# Patient Record
Sex: MALE | Race: WHITE | NOT HISPANIC OR LATINO | Employment: UNEMPLOYED | URBAN - NONMETROPOLITAN AREA
[De-identification: names, ages, dates, MRNs, and addresses within clinical notes are randomized per-mention and may not be internally consistent; named-entity substitution may affect disease eponyms.]

---

## 2017-06-22 ENCOUNTER — HOSPITAL ENCOUNTER (EMERGENCY)
Facility: HOSPITAL | Age: 27
Discharge: HOME | End: 2017-06-22
Attending: STUDENT IN AN ORGANIZED HEALTH CARE EDUCATION/TRAINING PROGRAM | Admitting: STUDENT IN AN ORGANIZED HEALTH CARE EDUCATION/TRAINING PROGRAM

## 2017-06-22 VITALS
TEMPERATURE: 98.7 F | HEART RATE: 90 BPM | BODY MASS INDEX: 37.13 KG/M2 | WEIGHT: 245 LBS | OXYGEN SATURATION: 97 % | HEIGHT: 68 IN | DIASTOLIC BLOOD PRESSURE: 87 MMHG | RESPIRATION RATE: 18 BRPM | SYSTOLIC BLOOD PRESSURE: 139 MMHG

## 2017-06-22 DIAGNOSIS — I10 ESSENTIAL HYPERTENSION: Primary | ICD-10-CM

## 2017-06-22 PROCEDURE — 99283 EMERGENCY DEPT VISIT LOW MDM: CPT

## 2017-06-22 RX ORDER — PANTOPRAZOLE SODIUM 40 MG/1
40 TABLET, DELAYED RELEASE ORAL DAILY
COMMUNITY
End: 2019-02-06 | Stop reason: SDUPTHER

## 2017-06-22 NOTE — ED PROVIDER NOTES
Subjective   HPI Comments: Patient is a 26-year-old male who presents with concerns for high blood pressure.  His mother checked his blood pressure today and he says his blood pressure was 160/120.  He smokes, but has no other chronic health issues.  Patient denies headache, vision changes, chest pain, shortness of breath, abdominal pain, nausea, vomiting or diarrhea.      Review of Systems   All other systems reviewed and are negative.      Past Medical History:   Diagnosis Date   • GERD (gastroesophageal reflux disease)        No Known Allergies    History reviewed. No pertinent surgical history.    History reviewed. No pertinent family history.    Social History     Social History   • Marital status:      Spouse name: N/A   • Number of children: N/A   • Years of education: N/A     Social History Main Topics   • Smoking status: Current Every Day Smoker     Packs/day: 1.00     Types: Cigarettes   • Smokeless tobacco: None   • Alcohol use No   • Drug use: No   • Sexual activity: Not Asked     Other Topics Concern   • None     Social History Narrative   • None           Objective   Physical Exam   Nursing note and vitals reviewed.    GEN: No acute distress  Head: Normocephalic, atraumatic  Eyes: Pupils equal round reactive to light  ENT: Posterior pharynx normal in appearance, oral mucosa is moist  Chest: Nontender to palpation  Cardiovascular: Regular rate  Lungs: Clear to auscultation bilaterally  Abdomen: Soft, nontender, nondistended, no peritoneal signs  Extremities: No edema, normal appearance  Neuro: GCS 15  Psych: Mood and affect are appropriate    Procedures         ED Course  ED Course                  MDM  Number of Diagnoses or Management Options  Essential hypertension:   Diagnosis management comments: Patient's blood pressure readings in the emergency department did not warrant immediate medication.  I did explain him that he would need a repeat in one week if it remained elevated that he would  need to be started on medication at that time.  Patient has no secondary symptoms.      Final diagnoses:   Essential hypertension            Jessee Renner MD  06/22/17 [id number]

## 2018-08-19 ENCOUNTER — HOSPITAL ENCOUNTER (EMERGENCY)
Facility: HOSPITAL | Age: 28
Discharge: HOME | End: 2018-08-19
Attending: EMERGENCY MEDICINE | Admitting: EMERGENCY MEDICINE

## 2018-08-19 VITALS
BODY MASS INDEX: 37.31 KG/M2 | SYSTOLIC BLOOD PRESSURE: 165 MMHG | RESPIRATION RATE: 18 BRPM | HEIGHT: 68 IN | OXYGEN SATURATION: 99 % | TEMPERATURE: 98.6 F | HEART RATE: 89 BPM | WEIGHT: 246.2 LBS | DIASTOLIC BLOOD PRESSURE: 89 MMHG

## 2018-08-19 DIAGNOSIS — H04.302 DACRYOCYSTITIS OF LEFT LACRIMAL SAC: Primary | ICD-10-CM

## 2018-08-19 PROCEDURE — 99282 EMERGENCY DEPT VISIT SF MDM: CPT

## 2018-08-19 RX ORDER — CLINDAMYCIN HYDROCHLORIDE 150 MG/1
600 CAPSULE ORAL ONCE
Status: DISCONTINUED | OUTPATIENT
Start: 2018-08-19 | End: 2018-08-19 | Stop reason: HOSPADM

## 2018-08-19 RX ORDER — LISINOPRIL 5 MG/1
5 TABLET ORAL DAILY
COMMUNITY
End: 2019-02-06 | Stop reason: SDUPTHER

## 2018-08-19 RX ORDER — CLINDAMYCIN HYDROCHLORIDE 300 MG/1
300 CAPSULE ORAL 3 TIMES DAILY
Qty: 21 CAPSULE | Refills: 0 | Status: SHIPPED | OUTPATIENT
Start: 2018-08-19 | End: 2019-02-06

## 2018-08-19 NOTE — ED PROVIDER NOTES
Subjective   History of Present Illness  27-year-old male otherwise healthy presenting with 2 days of swelling, redness, and slight pain underneath the left eye.  Denies fevers, chills, nausea, vomiting, pain when he moves his eye, changes in vision, foreign body sensation, drainage from the eye or trauma to the eye.  He has been on amoxicillin for a strep throat for the last 5 days.    Review of Systems   All other systems reviewed and are negative.      Past Medical History:   Diagnosis Date   • GERD (gastroesophageal reflux disease)        No Known Allergies    History reviewed. No pertinent surgical history.    History reviewed. No pertinent family history.    Social History     Social History   • Marital status:      Social History Main Topics   • Smoking status: Current Every Day Smoker     Packs/day: 1.00     Types: Cigarettes   • Alcohol use No   • Drug use: No     Other Topics Concern   • Not on file           Objective   Physical Exam   Constitutional: He is oriented to person, place, and time. He appears well-developed and well-nourished. No distress.   HENT:   Head: Normocephalic.   Mouth/Throat: Oropharynx is clear and moist.   Eyes: EOM are normal. Left eye exhibits no discharge. No scleral icterus.   Swelling just inferior to the left eyelid with mild erythema.    Neck: Normal range of motion. Neck supple. No tracheal deviation present.   Cardiovascular: Normal rate, regular rhythm, normal heart sounds and intact distal pulses.  Exam reveals no gallop and no friction rub.    No murmur heard.  Pulmonary/Chest: Effort normal and breath sounds normal. No stridor. No respiratory distress. He has no wheezes. He has no rales.   Neurological: He is alert and oriented to person, place, and time.   Skin: Skin is warm and dry. No rash noted. He is not diaphoretic. No erythema. No pallor.   Psychiatric: He has a normal mood and affect. His behavior is normal.   Nursing note and vitals  reviewed.      Procedures           ED Course                  MDM  27-year-old male here with what appears to be dacryocystitis.  No evidence on exam of conjunctivitis nor of orbital cellulitis.  Unfortunately, the amoxicillin likely is not covering all of possible agents.  We'll need to start on clindamycin for broader coverage, recommend cool compresses, and give information to call and follow-up with ophthalmology. Discussed return to care precautions.     Final diagnoses:   Dacryocystitis of left lacrimal sac            Cristi Starr MD  08/19/18 [id number]

## 2019-02-06 ENCOUNTER — OFFICE VISIT (OUTPATIENT)
Dept: FAMILY MEDICINE CLINIC | Facility: CLINIC | Age: 29
End: 2019-02-06

## 2019-02-06 VITALS
SYSTOLIC BLOOD PRESSURE: 122 MMHG | WEIGHT: 248.4 LBS | HEART RATE: 82 BPM | OXYGEN SATURATION: 96 % | HEIGHT: 71 IN | TEMPERATURE: 98.8 F | DIASTOLIC BLOOD PRESSURE: 78 MMHG | RESPIRATION RATE: 18 BRPM | BODY MASS INDEX: 34.77 KG/M2

## 2019-02-06 DIAGNOSIS — Z00.00 HEALTHCARE MAINTENANCE: Primary | ICD-10-CM

## 2019-02-06 DIAGNOSIS — I10 ESSENTIAL HYPERTENSION: ICD-10-CM

## 2019-02-06 DIAGNOSIS — K21.9 GASTROESOPHAGEAL REFLUX DISEASE WITHOUT ESOPHAGITIS: ICD-10-CM

## 2019-02-06 LAB
ALBUMIN SERPL-MCNC: 4.7 G/DL (ref 3.2–4.8)
ALBUMIN/GLOB SERPL: 2.2 G/DL (ref 1.5–2.5)
ALP SERPL-CCNC: 67 U/L (ref 25–100)
ALT SERPL W P-5'-P-CCNC: 35 U/L (ref 7–40)
ANION GAP SERPL CALCULATED.3IONS-SCNC: 4 MMOL/L (ref 3–11)
ARTICHOKE IGE QN: 109 MG/DL (ref 0–130)
AST SERPL-CCNC: 27 U/L (ref 0–33)
BASOPHILS # BLD AUTO: 0.04 10*3/MM3 (ref 0–0.2)
BASOPHILS NFR BLD AUTO: 0.4 % (ref 0–1)
BILIRUB BLD-MCNC: NEGATIVE MG/DL
BILIRUB SERPL-MCNC: 0.3 MG/DL (ref 0.3–1.2)
BUN BLD-MCNC: 19 MG/DL (ref 9–23)
BUN/CREAT SERPL: 20.2 (ref 7–25)
CALCIUM SPEC-SCNC: 9.6 MG/DL (ref 8.7–10.4)
CHLORIDE SERPL-SCNC: 106 MMOL/L (ref 99–109)
CHOLEST SERPL-MCNC: 149 MG/DL (ref 0–200)
CLARITY, POC: CLEAR
CO2 SERPL-SCNC: 28 MMOL/L (ref 20–31)
COLOR UR: YELLOW
CREAT BLD-MCNC: 0.94 MG/DL (ref 0.6–1.3)
CRP SERPL-MCNC: 1.16 MG/DL (ref 0–1)
DEPRECATED RDW RBC AUTO: 42.7 FL (ref 37–54)
EOSINOPHIL # BLD AUTO: 0.2 10*3/MM3 (ref 0–0.3)
EOSINOPHIL NFR BLD AUTO: 2.2 % (ref 0–3)
ERYTHROCYTE [DISTWIDTH] IN BLOOD BY AUTOMATED COUNT: 13.1 % (ref 11.3–14.5)
GFR SERPL CREATININE-BSD FRML MDRD: 96 ML/MIN/1.73
GLOBULIN UR ELPH-MCNC: 2.1 GM/DL
GLUCOSE BLD-MCNC: 74 MG/DL (ref 70–100)
GLUCOSE UR STRIP-MCNC: NEGATIVE MG/DL
HBA1C MFR BLD: 5.5 % (ref 4.8–5.6)
HCT VFR BLD AUTO: 47.5 % (ref 38.9–50.9)
HDLC SERPL-MCNC: 25 MG/DL (ref 40–60)
HGB BLD-MCNC: 16.1 G/DL (ref 13.1–17.5)
HIV1+2 AB SER QL: NORMAL
IMM GRANULOCYTES # BLD AUTO: 0.02 10*3/MM3 (ref 0–0.03)
IMM GRANULOCYTES NFR BLD AUTO: 0.2 % (ref 0–0.6)
KETONES UR QL: NEGATIVE
LEUKOCYTE EST, POC: NEGATIVE
LYMPHOCYTES # BLD AUTO: 2.74 10*3/MM3 (ref 0.6–4.8)
LYMPHOCYTES NFR BLD AUTO: 29.6 % (ref 24–44)
MCH RBC QN AUTO: 30.3 PG (ref 27–31)
MCHC RBC AUTO-ENTMCNC: 33.9 G/DL (ref 32–36)
MCV RBC AUTO: 89.5 FL (ref 80–99)
MONOCYTES # BLD AUTO: 0.84 10*3/MM3 (ref 0–1)
MONOCYTES NFR BLD AUTO: 9.1 % (ref 0–12)
NEUTROPHILS # BLD AUTO: 5.44 10*3/MM3 (ref 1.5–8.3)
NEUTROPHILS NFR BLD AUTO: 58.7 % (ref 41–71)
NITRITE UR-MCNC: NEGATIVE MG/ML
PH UR: 6 [PH] (ref 5–8)
PLATELET # BLD AUTO: 220 10*3/MM3 (ref 150–450)
PMV BLD AUTO: 12.5 FL (ref 6–12)
POTASSIUM BLD-SCNC: 4.5 MMOL/L (ref 3.5–5.5)
PROT SERPL-MCNC: 6.8 G/DL (ref 5.7–8.2)
PROT UR STRIP-MCNC: NEGATIVE MG/DL
RBC # BLD AUTO: 5.31 10*6/MM3 (ref 4.2–5.76)
RBC # UR STRIP: NEGATIVE /UL
SODIUM BLD-SCNC: 138 MMOL/L (ref 132–146)
SP GR UR: 1.03 (ref 1–1.03)
TRIGL SERPL-MCNC: 255 MG/DL (ref 0–150)
TSH SERPL DL<=0.05 MIU/L-ACNC: 1.64 MIU/ML (ref 0.35–5.35)
URATE SERPL-MCNC: 7.2 MG/DL (ref 3.7–9.2)
UROBILINOGEN UR QL: NORMAL
WBC NRBC COR # BLD: 9.26 10*3/MM3 (ref 3.5–10.8)

## 2019-02-06 PROCEDURE — 80061 LIPID PANEL: CPT | Performed by: FAMILY MEDICINE

## 2019-02-06 PROCEDURE — 99385 PREV VISIT NEW AGE 18-39: CPT | Performed by: FAMILY MEDICINE

## 2019-02-06 PROCEDURE — 86140 C-REACTIVE PROTEIN: CPT | Performed by: FAMILY MEDICINE

## 2019-02-06 PROCEDURE — 84550 ASSAY OF BLOOD/URIC ACID: CPT | Performed by: FAMILY MEDICINE

## 2019-02-06 PROCEDURE — 80050 GENERAL HEALTH PANEL: CPT | Performed by: FAMILY MEDICINE

## 2019-02-06 PROCEDURE — G0432 EIA HIV-1/HIV-2 SCREEN: HCPCS | Performed by: FAMILY MEDICINE

## 2019-02-06 PROCEDURE — 83036 HEMOGLOBIN GLYCOSYLATED A1C: CPT | Performed by: FAMILY MEDICINE

## 2019-02-06 RX ORDER — PANTOPRAZOLE SODIUM 40 MG/1
40 TABLET, DELAYED RELEASE ORAL DAILY
Qty: 30 TABLET | Refills: 1 | Status: SHIPPED | OUTPATIENT
Start: 2019-02-06 | End: 2019-03-08 | Stop reason: SDUPTHER

## 2019-02-06 RX ORDER — LISINOPRIL 5 MG/1
5 TABLET ORAL DAILY
Qty: 90 TABLET | Refills: 3 | Status: SHIPPED | OUTPATIENT
Start: 2019-02-06 | End: 2020-01-31 | Stop reason: SDUPTHER

## 2019-02-06 NOTE — PROGRESS NOTES
Subjective   Rahul Walters is a 28 y.o. male.     History of Present Illness   New patient to the office.  He reports previous care by Dr. Escoto in [place], [place].  He is here for his annual fasting wellness evaluation.  He declines immunization update.  He is needing a refill of his medications.  He is tolerating them well with no adverse events.  He describes good blood pressure control.  He denies reflux problems on his PPI.  He has never undergone a screening EGD.  He continues to smoke cigarettes and voices plans to discontinue.    Review of Systems   Constitutional: Negative.    HENT: Positive for dental problem (chronic).    Eyes: Negative.    Respiratory: Negative.    Cardiovascular: Negative.    Gastrointestinal: Negative.    Endocrine: Negative.    Genitourinary: Negative.    Musculoskeletal: Negative.    Skin: Negative.    Allergic/Immunologic: Negative.    Neurological: Negative.    Hematological: Negative.    Psychiatric/Behavioral: Negative.        Objective   Physical Exam   Constitutional: He is oriented to person, place, and time. He appears well-developed and well-nourished. He is cooperative.   Poor hygiene   HENT:   Head: Normocephalic and atraumatic.   Right Ear: Hearing and external ear normal.   Left Ear: Hearing and external ear normal.   Nose: Nose normal.   Mouth/Throat: Uvula is midline, oropharynx is clear and moist and mucous membranes are normal. Abnormal dentition. Dental caries present.   Eyes: Conjunctivae and EOM are normal. Pupils are equal, round, and reactive to light. No scleral icterus.   Neck: Trachea normal and normal range of motion. Neck supple. No JVD present. Carotid bruit is not present. No thyromegaly present.   Cardiovascular: Normal rate, regular rhythm, normal heart sounds and intact distal pulses.   Pulmonary/Chest: Effort normal and breath sounds normal.   Abdominal: Soft. Bowel sounds are normal. There is no hepatosplenomegaly. There is no tenderness.    Musculoskeletal: Normal range of motion.   Lymphadenopathy:     He has no cervical adenopathy.   Neurological: He is alert and oriented to person, place, and time. He has normal strength and normal reflexes. No sensory deficit. Gait normal.   Skin: Skin is warm and dry.   Psychiatric: He has a normal mood and affect. His speech is normal and behavior is normal. Judgment and thought content normal. Cognition and memory are normal.   Nursing note and vitals reviewed.      Assessment/Plan   Diagnoses and all orders for this visit:    Healthcare maintenance  -     POC Urinalysis Dipstick, Automated  -     Comprehensive Metabolic Panel  -     CBC & Differential  -     Lipid Panel  -     TSH  -     Uric Acid  -     C-reactive Protein  -     HIV-1 / O / 2 Ag / Antibody 4th Generation  -     Hemoglobin A1c  - Dental hygiene and routine dental evaluations  -     Ambulatory Referral to Nutrition Services  - Tobacco cessation education        -     The patient declined immunization update today        -     CDC.gov immunizations web site for patient vaccine information    Essential hypertension  -     lisinopril (PRINIVIL,ZESTRIL) 5 MG tablet; Take 1 tablet by mouth Daily #90 c 3 RF  -     POC Urinalysis Dipstick, Automated  -     Comprehensive Metabolic Panel  - Healthy heart diet  - Weight loss  - Low dose aspirin once daily  - Daily aerobic exercise  - Routine blood pressure monitoring  - [place] Heart Disease and Stroke Prevention Task Force pamphlet reviewed and administered.    Gastroesophageal reflux disease without esophagitis  -     pantoprazole (PROTONIX) 40 MG EC tablet; Take 1 tablet by mouth Daily #30 c 1RF  -     CBC & Differential  -     Ambulatory referral for Screening EGD  - HOB elevated  - Weight loss  - Avoid late night meals  - Avoid excessive caffeine   - Avoid excessive carbonated beverages  - Report any dysphagia or odynophagia    The patient is here for a health maintenance visit.  Currently, the  patient consumes a calorie enriched diet and has an inadequate exercise regimen. Screening lab work is ordered.  Immunizations are declined today and vaccine information is provided.  Advice and education is given regarding nutrition, aerobic exercise, routine dental evaluations (please see dentist), routine eye exams, reproductive health, cardiovascular risk reduction, sunscreen use, self skin examination (annual dermatology evaluations) and seat belt use (general overall safety).  Further recommendations after lab evaluation.  Annual wellness evaluations recommended.

## 2019-03-08 DIAGNOSIS — K21.9 GASTROESOPHAGEAL REFLUX DISEASE WITHOUT ESOPHAGITIS: ICD-10-CM

## 2019-03-08 RX ORDER — PANTOPRAZOLE SODIUM 40 MG/1
40 TABLET, DELAYED RELEASE ORAL DAILY
Qty: 30 TABLET | Refills: 6 | Status: SHIPPED | OUTPATIENT
Start: 2019-03-08 | End: 2019-03-12 | Stop reason: SDUPTHER

## 2019-03-08 RX ORDER — PANTOPRAZOLE SODIUM 40 MG/1
40 TABLET, DELAYED RELEASE ORAL DAILY
Qty: 30 TABLET | Refills: 1 | Status: CANCELLED | OUTPATIENT
Start: 2019-03-08

## 2019-03-12 ENCOUNTER — TELEPHONE (OUTPATIENT)
Dept: FAMILY MEDICINE CLINIC | Facility: CLINIC | Age: 29
End: 2019-03-12

## 2019-03-12 DIAGNOSIS — K21.9 GASTROESOPHAGEAL REFLUX DISEASE WITHOUT ESOPHAGITIS: ICD-10-CM

## 2019-03-12 RX ORDER — PANTOPRAZOLE SODIUM 40 MG/1
40 TABLET, DELAYED RELEASE ORAL DAILY
Qty: 30 TABLET | Refills: 11 | Status: SHIPPED | OUTPATIENT
Start: 2019-03-12 | End: 2019-05-07

## 2019-03-12 NOTE — TELEPHONE ENCOUNTER
----- Message from Alexi Colindres sent at 3/12/2019  7:56 AM EDT -----  Regarding: PRESCRIPTION  Contact: [phone]  PLEASE CALL PT HE WOULD LIKE TO KNOW WHY HIS MED REFILL REQUEST WAS DENIED

## 2019-04-17 ENCOUNTER — OFFICE VISIT (OUTPATIENT)
Dept: FAMILY MEDICINE CLINIC | Facility: CLINIC | Age: 29
End: 2019-04-17

## 2019-04-17 VITALS
BODY MASS INDEX: 35.22 KG/M2 | SYSTOLIC BLOOD PRESSURE: 124 MMHG | WEIGHT: 251.6 LBS | HEART RATE: 104 BPM | HEIGHT: 71 IN | DIASTOLIC BLOOD PRESSURE: 82 MMHG | RESPIRATION RATE: 18 BRPM | OXYGEN SATURATION: 98 %

## 2019-04-17 DIAGNOSIS — R42 LIGHT HEADEDNESS: Primary | ICD-10-CM

## 2019-04-17 PROCEDURE — 93000 ELECTROCARDIOGRAM COMPLETE: CPT | Performed by: FAMILY MEDICINE

## 2019-04-17 PROCEDURE — 99214 OFFICE O/P EST MOD 30 MIN: CPT | Performed by: FAMILY MEDICINE

## 2019-04-17 NOTE — PROGRESS NOTES
"Subjective   Rahul Walters is a 28 y.o. male.     History of Present Illness   The patient is accompanied by his wife.  He describes several year history of \"light headedness.\"  He describes a previous work up by a cardiologist in [place] in 2017.  He underwent a holter monitor and echo (data deficit today & records requested).  He reports an intermittent sensation of feeling \"wobbly\" and \"light headed\" with postural changes most commonly with standing.  When he sits back down it resolves in a minute.  He denies a spinning room sensation.  He denies falls or syncope.  He reports no associated palpitations, racing heart beats or confusion.  He denies headaches, visual disturbances, hearing loss or tinnitus.  He denies drug use, environmental exposure or recalled history of tick bites.  He is presently unemployed and stays home with the children.    Review of Systems   Constitutional: Negative for diaphoresis.   HENT: Negative for trouble swallowing.    Respiratory: Negative for shortness of breath.    Cardiovascular: Negative for chest pain, palpitations and leg swelling.   Musculoskeletal: Negative for gait problem.   Neurological: Positive for light-headedness. Negative for dizziness, tremors, seizures, syncope and weakness.   Psychiatric/Behavioral: Negative for confusion. The patient is nervous/anxious.        Objective   Physical Exam   Constitutional: He is oriented to person, place, and time. He appears well-developed and well-nourished.   HENT:   Head: Normocephalic and atraumatic.   Right Ear: Hearing and tympanic membrane normal.   Left Ear: Hearing and tympanic membrane normal.   Mouth/Throat: Mucous membranes are normal. Abnormal dentition.   Eyes: Conjunctivae and EOM are normal. Pupils are equal, round, and reactive to light.   Neck: Neck supple. No JVD present. No thyromegaly present.   Cardiovascular: Normal rate, regular rhythm and normal heart sounds.   Pulmonary/Chest: Effort normal and " breath sounds normal.   Abdominal: Soft. Bowel sounds are normal. There is no tenderness.   Musculoskeletal: Normal range of motion.   Neurological: He is alert and oriented to person, place, and time. He has normal strength and normal reflexes. No cranial nerve deficit or sensory deficit. Coordination and gait normal.   Skin: Skin is warm and dry.   Psychiatric: He has a normal mood and affect. His behavior is normal. Judgment and thought content normal. Cognition and memory are normal.   Nursing note and vitals reviewed.      ECG 12 Lead  Date/Time: 4/17/2019 9:21 AM  Performed by: Alfredo Riddle MD  Authorized by: Alfredo Riddle MD   Comparison: not compared with previous ECG   Previous ECG: no previous ECG available  Rhythm: sinus rhythm  Rate: normal  BPM: 80  Conduction: non-specific intraventricular conduction delay  QRS axis: normal    Clinical impression: abnormal EKG          Feb. 6, 2019 labs reviewed with satisfactory results.    Assessment/Plan   Diagnoses and all orders for this visit:    Light headedness  -     ECG 12 Lead  -     Tilt table test ordered  - Acquire records from [place] cardiologist  - Fall precautions  - ASA 81 mg po once daily  - Go to the ED with any sudden changes or concerns  -    Today I have spent a total of 25 minutes face to face with Rahul Walters and his wife.  During this time, a total of 15 minutes was spent counseling on the nature of his diagnosis including risks and benefits of treatment, complications, implications, management, safe and proper use of medications.  We discussed the work up and specialist referral process if needed.  Today I encouraged therapeutic lifestyle changes including tobacco cessation, a low calorie, healthy heart diet, daily aerobic exercise and medication compliance.  Patient education is provided today concerning related diagnosis with educational resources reviewed and discussed.  I encouraged compliance with follow up appointments  and referrals.

## 2019-05-07 ENCOUNTER — OFFICE VISIT (OUTPATIENT)
Dept: FAMILY MEDICINE CLINIC | Facility: CLINIC | Age: 29
End: 2019-05-07

## 2019-05-07 VITALS
OXYGEN SATURATION: 98 % | SYSTOLIC BLOOD PRESSURE: 130 MMHG | HEART RATE: 104 BPM | HEIGHT: 71 IN | BODY MASS INDEX: 35.61 KG/M2 | RESPIRATION RATE: 18 BRPM | DIASTOLIC BLOOD PRESSURE: 82 MMHG | TEMPERATURE: 98.5 F | WEIGHT: 254.4 LBS

## 2019-05-07 DIAGNOSIS — R42 VERTIGO: Primary | ICD-10-CM

## 2019-05-07 PROCEDURE — 99213 OFFICE O/P EST LOW 20 MIN: CPT | Performed by: FAMILY MEDICINE

## 2019-05-07 RX ORDER — PANTOPRAZOLE SODIUM 20 MG/1
TABLET, DELAYED RELEASE ORAL
COMMUNITY
Start: 2019-05-06 | End: 2020-01-09

## 2019-05-07 RX ORDER — MECLIZINE HYDROCHLORIDE 25 MG/1
25 TABLET ORAL 3 TIMES DAILY PRN
Qty: 30 TABLET | Refills: 0 | Status: SHIPPED | OUTPATIENT
Start: 2019-05-07 | End: 2020-01-09

## 2019-05-07 NOTE — PROGRESS NOTES
Subjective   Rahul Walters is a 28 y.o. male.     History of Present Illness   The patient describes recent onset of sinus congestion and post nasal drip he went to a local [facility].  Over the last couple of  days the patient has been experiencing a spinning room sensation that is characterized as dizziness.  It occurs with sudden movements.   It gets worse with head turning and body position change.  The room starts to spin.  There is associated nausea but no emesis.  The patient denies falls, fainting/syncope or hearing loss.  There is no associated tinnitus, visual disturbances, mental confusion or sudden headaches.  The symptoms improve with remaining still.    Review of Systems   Constitutional: Negative for chills and fever.   HENT: Positive for congestion.    Respiratory: Negative for cough and shortness of breath.    Cardiovascular: Negative for chest pain.   Neurological: Negative for syncope.       Objective   Physical Exam   Constitutional: He is oriented to person, place, and time. He appears well-developed and well-nourished.   HENT:   Head: Normocephalic and atraumatic.   Right Ear: Hearing normal.   Left Ear: Hearing normal.   Nose: Mucosal edema and rhinorrhea present.   Mouth/Throat: Mucous membranes are normal.   Eyes: Conjunctivae and EOM are normal. Pupils are equal, round, and reactive to light. Right eye exhibits no nystagmus. Left eye exhibits no nystagmus.   Neck: Neck supple. No JVD present. No thyromegaly present.   Cardiovascular: Normal rate, regular rhythm and normal heart sounds.   Pulmonary/Chest: Effort normal and breath sounds normal.   Musculoskeletal: Normal range of motion.   Neurological: He is alert and oriented to person, place, and time. He has normal strength. No cranial nerve deficit or sensory deficit. Coordination and gait normal.   Skin: Skin is warm and dry.   Psychiatric: He has a normal mood and affect. His behavior is normal. Judgment and thought content normal.  Cognition and memory are normal.   Nursing note and vitals reviewed.      Assessment/Plan   Diagnoses and all orders for this visit:    Vertigo  -     meclizine (ANTIVERT) 25 MG tablet; Take 1 tablet by mouth 3 Times a Day prn dizziness. #30  - Stay well hydrated  - Avoid driving  - Fall precautions  - Proceed with tilt table test as recommended

## 2019-05-21 ENCOUNTER — APPOINTMENT (OUTPATIENT)
Dept: CARDIOLOGY | Facility: HOSPITAL | Age: 29
End: 2019-05-21

## 2019-06-24 ENCOUNTER — APPOINTMENT (OUTPATIENT)
Dept: CARDIOLOGY | Facility: HOSPITAL | Age: 29
End: 2019-06-24

## 2019-10-11 ENCOUNTER — OFFICE VISIT (OUTPATIENT)
Dept: FAMILY MEDICINE CLINIC | Facility: CLINIC | Age: 29
End: 2019-10-11

## 2019-10-11 VITALS
OXYGEN SATURATION: 98 % | DIASTOLIC BLOOD PRESSURE: 80 MMHG | TEMPERATURE: 97.7 F | HEART RATE: 99 BPM | BODY MASS INDEX: 36.65 KG/M2 | WEIGHT: 261.8 LBS | HEIGHT: 71 IN | RESPIRATION RATE: 16 BRPM | SYSTOLIC BLOOD PRESSURE: 124 MMHG

## 2019-10-11 DIAGNOSIS — F17.200 TOBACCO DEPENDENCE: Primary | ICD-10-CM

## 2019-10-11 DIAGNOSIS — Z23 IMMUNIZATION DUE: ICD-10-CM

## 2019-10-11 PROCEDURE — 90471 IMMUNIZATION ADMIN: CPT | Performed by: FAMILY MEDICINE

## 2019-10-11 PROCEDURE — 90732 PPSV23 VACC 2 YRS+ SUBQ/IM: CPT | Performed by: FAMILY MEDICINE

## 2019-10-11 PROCEDURE — 99407 BEHAV CHNG SMOKING > 10 MIN: CPT | Performed by: FAMILY MEDICINE

## 2019-10-11 PROCEDURE — 90674 CCIIV4 VAC NO PRSV 0.5 ML IM: CPT | Performed by: FAMILY MEDICINE

## 2019-10-11 PROCEDURE — 99214 OFFICE O/P EST MOD 30 MIN: CPT | Performed by: FAMILY MEDICINE

## 2019-10-11 PROCEDURE — 90472 IMMUNIZATION ADMIN EACH ADD: CPT | Performed by: FAMILY MEDICINE

## 2019-10-11 PROCEDURE — 90715 TDAP VACCINE 7 YRS/> IM: CPT | Performed by: FAMILY MEDICINE

## 2019-10-11 RX ORDER — VARENICLINE TARTRATE 1 MG/1
1 TABLET, FILM COATED ORAL 2 TIMES DAILY
Qty: 56 TABLET | Refills: 1 | Status: SHIPPED | OUTPATIENT
Start: 2019-10-11 | End: 2020-03-24 | Stop reason: SDUPTHER

## 2019-10-11 RX ORDER — NICOTINE 21 MG/24HR
1 PATCH, TRANSDERMAL 24 HOURS TRANSDERMAL EVERY 24 HOURS
Start: 2019-10-11 | End: 2020-01-09 | Stop reason: SDUPTHER

## 2019-10-11 NOTE — PATIENT INSTRUCTIONS
Preventing Unhealthy Weight Gain, Adult  Staying at a healthy weight is important to your overall health. When fat builds up in your body, you may become overweight or obese. Being overweight or obese increases your risk of developing certain health problems, such as heart disease, diabetes, sleeping problems, joint problems, and some types of cancer.  Unhealthy weight gain is often the result of making unhealthy food choices or not getting enough exercise. You can make changes to your lifestyle to prevent obesity and stay as healthy as possible.  What nutrition changes can be made?    · Eat only as much as your body needs. To do this:  ? Pay attention to signs that you are hungry or full. Stop eating as soon as you feel full.  ? If you feel hungry, try drinking water first before eating. Drink enough water so your urine is clear or pale yellow.  ? Eat smaller portions. Pay attention to portion sizes when eating out.  ? Look at serving sizes on food labels. Most foods contain more than one serving per container.  ? Eat the recommended number of calories for your gender and activity level. For most active people, a daily total of 2,000 calories is appropriate. If you are trying to lose weight or are not very active, you may need to eat fewer calories. Talk with your health care provider or a diet and nutrition specialist (dietitian) about how many calories you need each day.  · Choose healthy foods, such as:  ? Fruits and vegetables. At each meal, try to fill at least half of your plate with fruits and vegetables.  ? Whole grains, such as whole-wheat bread, brown rice, and quinoa.  ? Lean meats, such as chicken or fish.  ? Other healthy proteins, such as beans, eggs, or tofu.  ? Healthy fats, such as nuts, seeds, fatty fish, and olive oil.  ? Low-fat or fat-free dairy products.  · Check food labels, and avoid food and drinks that:  ? Are high in calories.  ? Have added sugar.  ? Are high in sodium.  ? Have saturated  fats or trans fats.  · Cook foods in healthier ways, such as by baking, broiling, or grilling.  · Make a meal plan for the week, and shop with a grocery list to help you stay on track with your purchases. Try to avoid going to the grocery store when you are hungry.  · When grocery shopping, try to shop around the outside of the store first, where the fresh foods are. Doing this helps you to avoid prepackaged foods, which can be high in sugar, salt (sodium), and fat.  What lifestyle changes can be made?    · Exercise for 30 or more minutes on 5 or more days each week. Exercising may include brisk walking, yard work, biking, running, swimming, and team sports like basketball and soccer. Ask your health care provider which exercises are safe for you.  · Do muscle-strengthening activities, such as lifting weights or using resistance bands, on 2 or more days a week.  · Do not use any products that contain nicotine or tobacco, such as cigarettes and e-cigarettes. If you need help quitting, ask your health care provider.  · Limit alcohol intake to no more than 1 drink a day for nonpregnant women and 2 drinks a day for men. One drink equals 12 oz of beer, 5 oz of wine, or 1½ oz of hard liquor.  · Try to get 7-9 hours of sleep each night.  What other changes can be made?  · Keep a food and activity journal to keep track of:  ? What you ate and how many calories you had. Remember to count the calories in sauces, dressings, and side dishes.  ? Whether you were active, and what exercises you did.  ? Your calorie, weight, and activity goals.  · Check your weight regularly. Track any changes. If you notice you have gained weight, make changes to your diet or activity routine.  · Avoid taking weight-loss medicines or supplements. Talk to your health care provider before starting any new medicine or supplement.  · Talk to your health care provider before trying any new diet or exercise plan.  Why are these changes  important?  Eating healthy, staying active, and having healthy habits can help you to prevent obesity. Those changes also:  · Help you manage stress and emotions.  · Help you connect with friends and family.  · Improve your self-esteem.  · Improve your sleep.  · Prevent long-term health problems.  What can happen if changes are not made?  Being obese or overweight can cause you to develop joint or bone problems, which can make it hard for you to stay active or do activities you enjoy. Being obese or overweight also puts stress on your heart and lungs and can lead to health problems like diabetes, heart disease, and some cancers.  Where to find more information  Talk with your health care provider or a dietitian about healthy eating and healthy lifestyle choices. You may also find information from:  · [place] Department of Agriculture, MyPlate: www.IGIGImyplate.gov  · American Heart Association: www.heart.org  · Centers for Disease Control and Prevention: www.cdc.gov  Summary  · Staying at a healthy weight is important to your overall health. It helps you to prevent certain diseases and health problems, such as heart disease, diabetes, joint problems, sleep disorders, and some types of cancer.  · Being obese or overweight can cause you to develop joint or bone problems, which can make it hard for you to stay active or do activities you enjoy.  · You can prevent unhealthy weight gain by eating a healthy diet, exercising regularly, not smoking, limiting alcohol, and getting enough sleep.  · Talk with your health care provider or a dietitian for guidance about healthy eating and healthy lifestyle choices.  This information is not intended to replace advice given to you by your health care provider. Make sure you discuss any questions you have with your health care provider.  Document Released: 12/19/2017 Document Revised: 09/28/2018 Document Reviewed: 01/24/2018  [employer] Interactive Patient Education © 2019 [employer]

## 2019-10-11 NOTE — PROGRESS NOTES
Subjective   Rahul Walters is a 29 y.o. male.     History of Present Illness   He is accompanied by his wife.  He is here to discuss smoking cessation.  He describes about 12 years of smoking 1 pack of cigarettes per day and is interested in quitting.  He is inquiring about medications to help.  He denies acute weight changes, dyspnea at rest, hemoptysis or unusual coughing.  He denies previous mood disorder treatments.  He voices no recent illness, fever or rashes.  He is amendable to updating his immunizations including the annual flu shot.    Review of Systems   Constitutional: Negative for chills and fever.   Eyes: Negative for visual disturbance.   Respiratory: Negative for cough and shortness of breath.    Cardiovascular: Negative for chest pain, palpitations and leg swelling.   Gastrointestinal: Negative for abdominal pain, diarrhea, nausea and vomiting.   Genitourinary: Negative for difficulty urinating.   Musculoskeletal: Negative for arthralgias.   Skin: Negative for rash.       Objective   Physical Exam   Constitutional: He is oriented to person, place, and time. He appears well-developed and well-nourished.   HENT:   Head: Normocephalic and atraumatic.   Right Ear: Hearing normal.   Left Ear: Hearing normal.   Mouth/Throat: Mucous membranes are normal. Abnormal dentition.   Eyes: Conjunctivae and EOM are normal. Pupils are equal, round, and reactive to light.   Neck: Neck supple. No JVD present. No thyromegaly present.   Cardiovascular: Normal rate, regular rhythm and normal heart sounds.   Pulmonary/Chest: Effort normal and breath sounds normal.   Musculoskeletal: Normal range of motion.   Neurological: He is alert and oriented to person, place, and time. He has normal strength. No sensory deficit. Gait normal.   Skin: Skin is warm and dry.   Psychiatric: He has a normal mood and affect. His behavior is normal. Judgment and thought content normal. Cognition and memory are normal.   Nursing  note and vitals reviewed.      Assessment/Plan   Diagnoses and all orders for this visit:    Tobacco dependence  -     Pneumococcal Polysaccharide Vaccine 23-Valent IM  - Vaccine counseling and current VIS is provided for immunizations administered today.  -     CHANTIX STARTING MONTH TONI 0.5 MG X 11 & 1 MG X 42 tablet; Take 0.5 mg one daily on days 1-3 and and 0.5 mg twice daily on days 4-7 then 1 mg po bid.  -     CHANTIX CONTINUING MONTH TONI 1 MG tablet; Take 1 tablet by mouth 2 (Two) Times a Day.  -     Ambulatory Referral to Smoking Cessation Program  -     nicotine (EQ NICOTINE) 21 MG/24HR patch; Place 1 patch on the skin as directed by provider Daily.  - Rahul Walters is a current cigarettes user.  He currently smokes 1 pack of cigarettes per day for a duration of 12 years. I have educated him on the risk of diseases from using tobacco products such as cancer, COPD and heart diease.  I advised him to quit and he is willing to quit. We have discussed the following method/s for tobacco cessation:  Education Material Counseling OTC Cessation Products Prescription [facility].  Together we have set a quit date for 2 weeks from today.  He will follow up with me in 1 month or sooner to check on his progress.   I spent >10 minutes counseling the patient.  We discussed various smoking cessation programs including Norman Albarran, Commit to Quit and Kick It.  We discussed over the counter nicotine replacement products.  We discussed Chantix and associated advertisements.  We discussed the benefits of cessation.  We discussed the risks of ongoing use including but not limited to lung disease, cancer and death.  The patient is encouraged to discontinue smoking and treatment options have been discussed & offered & prescribed.  Today I have spent a total of 25 minutes face to face with Rahul Walters and his significant other.  During this time, a total of 15 minutes was spent counseling on the nature  of his diagnosis including risks and benefits of treatment, complications, implications, management, safe and proper use of medications.  We discussed his Feb 2019 labs and smoking cessation program referral process.  Today I encouraged therapeutic lifestyle changes including a low calorie, healthy heart diet, daily aerobic exercise, routine dental evaluations and medication compliance.  Patient education is provided today concerning related diagnosis with educational resources reviewed and discussed.  I encouraged compliance with follow up appointments and referrals.      Immunization due  -     Tdap Vaccine IM  -     Fluarix/Fluzone/Afluria Quad>6 Months  -     Pneumococcal Polysaccharide Vaccine 23-Valent IM  - Vaccine counseling and current VIS is provided for immunizations administered today.    RTC in 3-4 weeks

## 2020-01-09 ENCOUNTER — TELEPHONE (OUTPATIENT)
Dept: FAMILY MEDICINE CLINIC | Facility: CLINIC | Age: 30
End: 2020-01-09

## 2020-01-09 ENCOUNTER — OFFICE VISIT (OUTPATIENT)
Dept: FAMILY MEDICINE CLINIC | Facility: CLINIC | Age: 30
End: 2020-01-09

## 2020-01-09 VITALS
TEMPERATURE: 97.9 F | RESPIRATION RATE: 18 BRPM | BODY MASS INDEX: 35.56 KG/M2 | WEIGHT: 254 LBS | DIASTOLIC BLOOD PRESSURE: 98 MMHG | SYSTOLIC BLOOD PRESSURE: 126 MMHG | HEART RATE: 77 BPM | HEIGHT: 71 IN | OXYGEN SATURATION: 98 %

## 2020-01-09 DIAGNOSIS — I10 ESSENTIAL HYPERTENSION: ICD-10-CM

## 2020-01-09 DIAGNOSIS — F17.200 TOBACCO DEPENDENCE: Primary | ICD-10-CM

## 2020-01-09 PROBLEM — K21.9 GERD (GASTROESOPHAGEAL REFLUX DISEASE): Status: ACTIVE | Noted: 2020-01-09

## 2020-01-09 PROCEDURE — 99406 BEHAV CHNG SMOKING 3-10 MIN: CPT | Performed by: FAMILY MEDICINE

## 2020-01-09 PROCEDURE — 99213 OFFICE O/P EST LOW 20 MIN: CPT | Performed by: FAMILY MEDICINE

## 2020-01-09 RX ORDER — POLYETHYLENE GLYCOL 3350 17 G
2 POWDER IN PACKET (EA) ORAL AS NEEDED
Qty: 27 EACH | Refills: 3 | Status: SHIPPED | OUTPATIENT
Start: 2020-01-09 | End: 2021-11-17

## 2020-01-09 RX ORDER — PANTOPRAZOLE SODIUM 40 MG/1
TABLET, DELAYED RELEASE ORAL
COMMUNITY
Start: 2019-12-26 | End: 2020-03-27 | Stop reason: SDUPTHER

## 2020-01-09 RX ORDER — NICOTINE 21 MG/24HR
1 PATCH, TRANSDERMAL 24 HOURS TRANSDERMAL EVERY 24 HOURS
Qty: 28 EACH | Refills: 0 | Status: SHIPPED | OUTPATIENT
Start: 2020-01-09 | End: 2020-06-04

## 2020-01-09 NOTE — TELEPHONE ENCOUNTER
Please tell pharmacy can take lozenges every 1-2 hours as needed with a maximum of 12 lozenges/day.

## 2020-01-09 NOTE — TELEPHONE ENCOUNTER
LVM at pharm with clarification per mendez pt is to use lozenges every 1-2 hours as needed with a maximum of 12 a day.

## 2020-01-09 NOTE — PROGRESS NOTES
Rahul Walters is a 29 y.o. male who presents today to establish care.    Chief Complaint   Patient presents with   • Establish Care        Patient is here today to follow-up on hypertension and smoking cessation.  He also like to establish care as he is a former Dr. Mayberry patient.  Patient states he smokes about a pack and half a day.  He has not tried anything to quit smoking in the past.  Patient was ordered a prescription for Chantix and nicotine replacement at previous visit but had not picked it up as he wanted to talk about the side effects of medications before starting.  Patient does not check his blood pressure at home and has been stable during his office visits.  Encouraged him to quit smoking as this will likely help decrease his blood pressure as well.  Patient denies chest pain, shortness of breath, nausea, vomiting, diarrhea, constipation, urinary symptoms, depression, anxiety, suicidal ideation, and edema.       Review of Systems   Constitutional: Negative for fever and unexpected weight loss.   HENT: Negative for congestion, ear pain and sore throat.    Eyes: Negative for visual disturbance.   Respiratory: Negative for cough, shortness of breath and wheezing.    Cardiovascular: Negative for chest pain and palpitations.   Gastrointestinal: Negative for abdominal pain, blood in stool, constipation, diarrhea, nausea, vomiting and GERD.   Endocrine: Negative for polydipsia and polyuria.   Genitourinary: Negative for difficulty urinating.   Musculoskeletal: Negative for joint swelling.   Skin: Negative for rash and skin lesions.   Allergic/Immunologic: Negative for environmental allergies.   Neurological: Negative for seizures and syncope.   Hematological: Does not bruise/bleed easily.   Psychiatric/Behavioral: Negative for suicidal ideas.        Past Medical History:   Diagnosis Date   • Dental caries    • GERD (gastroesophageal reflux disease)    • Gingivitis    • HTN (hypertension)    •  Obesity    • Periodontal disease    • Tobacco dependence         Past Surgical History:   Procedure Laterality Date   • WISDOM TOOTH EXTRACTION  2017        Family History   Problem Relation Age of Onset   • Heart attack Mother    • Coronary artery disease Mother    • Stroke Father    • Hypertension Father         Social History     Socioeconomic History   • Marital status:      Spouse name: Suni   • Number of children: 2   • Years of education: H.S.   • Highest education level: High school graduate   Occupational History   • Occupation:      Employer: UNEMPLOYED   Tobacco Use   • Smoking status: Current Every Day Smoker     Packs/day: 1.50     Years: 12.00     Pack years: 18.00     Types: Cigarettes   • Smokeless tobacco: Never Used   Substance and Sexual Activity   • Alcohol use: No   • Drug use: No   • Sexual activity: Yes     Partners: Female     Birth control/protection: Tubal ligation        Current Outpatient Medications on File Prior to Visit   Medication Sig Dispense Refill   • lisinopril (PRINIVIL,ZESTRIL) 5 MG tablet Take 1 tablet by mouth Daily. 90 tablet 3   • pantoprazole (PROTONIX) 40 MG EC tablet      • varenicline (CHANTIX CONTINUING MONTH TONI) 1 MG tablet Take 1 tablet by mouth 2 (Two) Times a Day. 56 tablet 1   • varenicline (CHANTIX STARTING MONTH TONI) 0.5 MG X 11 & 1 MG X 42 tablet Take 0.5 mg one daily on days 1-3 and and 0.5 mg twice daily on days 4-7.Then 1 mg twice daily for a total of 12 weeks. 53 tablet 0   • [DISCONTINUED] meclizine (ANTIVERT) 25 MG tablet Take 1 tablet by mouth 3 (Three) Times a Day As Needed for dizziness. 30 tablet 0   • [DISCONTINUED] nicotine (EQ NICOTINE) 21 MG/24HR patch Place 1 patch on the skin as directed by provider Daily.     • [DISCONTINUED] pantoprazole (PROTONIX) 20 MG EC tablet        No current facility-administered medications on file prior to visit.        No Known Allergies     Visit Vitals  /98 (BP Location: Right arm,  "Patient Position: Sitting, Cuff Size: Adult)   Pulse 77   Temp 97.9 °F (36.6 °C) (Temporal)   Resp 18   Ht 180.3 cm (71\")   Wt 115 kg (254 lb)   SpO2 98%   BMI 35.43 kg/m²      Body mass index is 35.43 kg/m².    Physical Exam   Constitutional: He is oriented to person, place, and time. He appears well-developed and well-nourished. No distress.   HENT:   Head: Atraumatic.   Eyes: EOM are normal.   Neck: Normal range of motion. Neck supple.   Cardiovascular: Normal rate, regular rhythm, normal heart sounds and intact distal pulses. Exam reveals no gallop and no friction rub.   No murmur heard.  Pulmonary/Chest: Effort normal and breath sounds normal. No stridor. No respiratory distress. He has no wheezes. He has no rales.   Musculoskeletal: He exhibits no edema.   Neurological: He is alert and oriented to person, place, and time.   Skin: Skin is warm and dry. He is not diaphoretic.   Psychiatric: He has a normal mood and affect. His behavior is normal.        Results for orders placed or performed in visit on 02/06/19   Comprehensive Metabolic Panel   Result Value Ref Range    Glucose 74 70 - 100 mg/dL    BUN 19 9 - 23 mg/dL    Creatinine 0.94 0.60 - 1.30 mg/dL    Sodium 138 132 - 146 mmol/L    Potassium 4.5 3.5 - 5.5 mmol/L    Chloride 106 99 - 109 mmol/L    CO2 28.0 20.0 - 31.0 mmol/L    Calcium 9.6 8.7 - 10.4 mg/dL    Total Protein 6.8 5.7 - 8.2 g/dL    Albumin 4.70 3.20 - 4.80 g/dL    ALT (SGPT) 35 7 - 40 U/L    AST (SGOT) 27 0 - 33 U/L    Alkaline Phosphatase 67 25 - 100 U/L    Total Bilirubin 0.3 0.3 - 1.2 mg/dL    eGFR Non African Amer 96 >60 mL/min/1.73    Globulin 2.1 gm/dL    A/G Ratio 2.2 1.5 - 2.5 g/dL    BUN/Creatinine Ratio 20.2 7.0 - 25.0    Anion Gap 4.0 3.0 - 11.0 mmol/L   Lipid Panel   Result Value Ref Range    Total Cholesterol 149 0 - 200 mg/dL    Triglycerides 255 (H) 0 - 150 mg/dL    HDL Cholesterol 25 (L) 40 - 60 mg/dL    LDL Cholesterol  109 0 - 130 mg/dL   TSH   Result Value Ref Range    TSH " 1.635 0.350 - 5.350 mIU/mL   Uric Acid   Result Value Ref Range    Uric Acid 7.2 3.7 - 9.2 mg/dL   C-reactive Protein   Result Value Ref Range    C-Reactive Protein 1.16 (H) 0.00 - 1.00 mg/dL   HIV-1 / O / 2 Ag / Antibody 4th Generation   Result Value Ref Range    HIV-1/ HIV-2 Non-Reactive Non-Reactive   Hemoglobin A1c   Result Value Ref Range    Hemoglobin A1C 5.50 4.80 - 5.60 %   CBC Auto Differential   Result Value Ref Range    WBC 9.26 3.50 - 10.80 10*3/mm3    RBC 5.31 4.20 - 5.76 10*6/mm3    Hemoglobin 16.1 13.1 - 17.5 g/dL    Hematocrit 47.5 38.9 - 50.9 %    MCV 89.5 80.0 - 99.0 fL    MCH 30.3 27.0 - 31.0 pg    MCHC 33.9 32.0 - 36.0 g/dL    RDW 13.1 11.3 - 14.5 %    RDW-SD 42.7 37.0 - 54.0 fl    MPV 12.5 (H) 6.0 - 12.0 fL    Platelets 220 150 - 450 10*3/mm3    Neutrophil % 58.7 41.0 - 71.0 %    Lymphocyte % 29.6 24.0 - 44.0 %    Monocyte % 9.1 0.0 - 12.0 %    Eosinophil % 2.2 0.0 - 3.0 %    Basophil % 0.4 0.0 - 1.0 %    Immature Grans % 0.2 0.0 - 0.6 %    Neutrophils, Absolute 5.44 1.50 - 8.30 10*3/mm3    Lymphocytes, Absolute 2.74 0.60 - 4.80 10*3/mm3    Monocytes, Absolute 0.84 0.00 - 1.00 10*3/mm3    Eosinophils, Absolute 0.20 0.00 - 0.30 10*3/mm3    Basophils, Absolute 0.04 0.00 - 0.20 10*3/mm3    Immature Grans, Absolute 0.02 0.00 - 0.03 10*3/mm3   POC Urinalysis Dipstick, Automated   Result Value Ref Range    Color Yellow Yellow, Straw, Dark Yellow, Sarah    Clarity, UA Clear Clear    Specific Gravity  1.030 1.005 - 1.030    pH, Urine 6.0 5.0 - 8.0    Leukocytes Negative Negative    Nitrite, UA Negative Negative    Protein, POC Negative Negative mg/dL    Glucose, UA Negative Negative, 1000 mg/dL (3+) mg/dL    Ketones, UA Negative Negative    Urobilinogen, UA Normal Normal    Bilirubin Negative Negative    Blood, UA Negative Negative        Problems Addressed this Visit        Other    Tobacco dependence    Relevant Medications    nicotine polacrilex (NICORETTE) 2 MG lozenge    nicotine (EQ NICOTINE) 21  MG/24HR patch      Other Visit Diagnoses     Well adult exam    -  Primary          Return in about 3 months (around 4/9/2020) for Annual.    Parts of this office note have been dictated by voice recognition software. Grammatical and/or spelling errors may be present.     Boom Benoit MD  1/9/2020

## 2020-01-09 NOTE — TELEPHONE ENCOUNTER
Pharmacy was calling to request clarification on how many lozenges per day the patient is allowed (max amount)

## 2020-01-09 NOTE — ASSESSMENT & PLAN NOTE
Tobacco use is unchanged.  Patient was counseled on smoking cessation techniques for approximately 5 minutes.  Smoking cessation counseling was provided.  Pharmacotherapy was prescribed as ordered.  Tobacco use will be reassessed in 3 months.

## 2020-01-14 ENCOUNTER — TELEPHONE (OUTPATIENT)
Dept: FAMILY MEDICINE CLINIC | Facility: CLINIC | Age: 30
End: 2020-01-14

## 2020-01-14 ENCOUNTER — OFFICE VISIT (OUTPATIENT)
Dept: FAMILY MEDICINE CLINIC | Facility: CLINIC | Age: 30
End: 2020-01-14

## 2020-01-14 VITALS
OXYGEN SATURATION: 97 % | HEART RATE: 90 BPM | RESPIRATION RATE: 24 BRPM | TEMPERATURE: 98.4 F | HEIGHT: 71 IN | WEIGHT: 254 LBS | SYSTOLIC BLOOD PRESSURE: 128 MMHG | BODY MASS INDEX: 35.56 KG/M2 | DIASTOLIC BLOOD PRESSURE: 84 MMHG

## 2020-01-14 DIAGNOSIS — J11.1 BRONCHITIS WITH INFLUENZA: ICD-10-CM

## 2020-01-14 DIAGNOSIS — R06.2 WHEEZING: ICD-10-CM

## 2020-01-14 DIAGNOSIS — J11.1 INFLUENZA: ICD-10-CM

## 2020-01-14 DIAGNOSIS — F17.200 TOBACCO DEPENDENCE: ICD-10-CM

## 2020-01-14 PROCEDURE — 99213 OFFICE O/P EST LOW 20 MIN: CPT | Performed by: NURSE PRACTITIONER

## 2020-01-14 RX ORDER — BENZONATATE 100 MG/1
100 CAPSULE ORAL 3 TIMES DAILY PRN
Qty: 30 CAPSULE | Refills: 0 | Status: SHIPPED | OUTPATIENT
Start: 2020-01-14 | End: 2020-01-24

## 2020-01-14 RX ORDER — OSELTAMIVIR PHOSPHATE 75 MG/1
CAPSULE ORAL
COMMUNITY
Start: 2020-01-13 | End: 2020-05-12

## 2020-01-14 RX ORDER — IPRATROPIUM BROMIDE AND ALBUTEROL SULFATE 2.5; .5 MG/3ML; MG/3ML
3 SOLUTION RESPIRATORY (INHALATION) ONCE
Status: DISCONTINUED | OUTPATIENT
Start: 2020-01-14 | End: 2022-10-20

## 2020-01-14 RX ORDER — ALBUTEROL SULFATE 90 UG/1
2 AEROSOL, METERED RESPIRATORY (INHALATION) EVERY 6 HOURS PRN
Qty: 1 INHALER | Refills: 1 | Status: SHIPPED | OUTPATIENT
Start: 2020-01-14 | End: 2021-11-17

## 2020-01-14 RX ORDER — PREDNISONE 10 MG/1
TABLET ORAL
Qty: 21 TABLET | Refills: 0 | Status: SHIPPED | OUTPATIENT
Start: 2020-01-14 | End: 2020-05-12

## 2020-01-14 NOTE — TELEPHONE ENCOUNTER
Patient states that he got prescribed Prednisone and the script says to take 6 pills today and follow directions on the package for other days, but he has only taken 2 pills and it has made his heart race and was wondering if he should keep taking it.

## 2020-01-14 NOTE — PROGRESS NOTES
Subjective   Rahul Walters is a 29 y.o. male.   Chief Complaint   Patient presents with   • Influenza      Wheezing    The current episode started yesterday. The problem occurs intermittently. The problem has been gradually worsening. Associated symptoms include chills, coughing, a fever, headaches and shortness of breath. The symptoms are aggravated by any activity. He has tried nothing (had a steroid shot yesterday ) for the symptoms. The treatment provided no relief. There is no history of asthma, bronchiolitis, CAD, chronic lung disease, COPD, heart failure, PE or pneumonia.      Patient reports he was diagnosed with influenza yesterday at [facility]. He states he was given IV fluids, IV steroids. Started on tamiflu.   He is here today for feeling of shortness of breath. Wheezing.   He is a current smoker.   He is here with his spouse and young son.       The following portions of the patient's history were reviewed and updated as appropriate: allergies, current medications, past family history, past medical history, past social history, past surgical history and problem list.    Review of Systems   Constitutional: Positive for activity change, appetite change, chills and fever.   HENT: Positive for congestion and dental problem.    Respiratory: Positive for cough, shortness of breath and wheezing.         Clear mucous    Cardiovascular: Negative.    Gastrointestinal: Negative.    Musculoskeletal: Positive for myalgias.   Skin: Negative.    Allergic/Immunologic: Negative.    Neurological: Positive for headaches.   Psychiatric/Behavioral: Negative.      Past Surgical History:   Procedure Laterality Date   • WISDOM TOOTH EXTRACTION  2017     Past Medical History:   Diagnosis Date   • Dental caries    • GERD (gastroesophageal reflux disease)    • Gingivitis    • HTN (hypertension)    • Obesity    • Periodontal disease    • Tobacco dependence        Objective   No Known Allergies  Visit Vitals  /84  "  Pulse 90   Temp 98.4 °F (36.9 °C) (Temporal)   Resp 24   Ht 180.3 cm (71\")   Wt 115 kg (254 lb)   SpO2 97%   BMI 35.43 kg/m²       Physical Exam   Constitutional: He is oriented to person, place, and time. Vital signs are normal. He appears well-developed. He is cooperative. He appears ill.   HENT:   Head: Normocephalic.   Right Ear: Hearing, tympanic membrane, external ear and ear canal normal.   Left Ear: Hearing, tympanic membrane, external ear and ear canal normal.   Mouth/Throat: Uvula is midline. Posterior oropharyngeal erythema present. Tonsils are 2+ on the right. Tonsils are 2+ on the left. No tonsillar exudate.   Eyes: Pupils are equal, round, and reactive to light.   Neck: Normal range of motion.   Cardiovascular: Normal rate and regular rhythm.   Pulmonary/Chest: Effort normal. He has decreased breath sounds. He has wheezes in the right upper field, the right middle field, the left upper field and the left middle field.   Lymphadenopathy:     He has cervical adenopathy.   Neurological: He is alert and oriented to person, place, and time.   Skin: Skin is warm, dry and intact. Capillary refill takes less than 2 seconds.   Psychiatric: He has a normal mood and affect. His behavior is normal.   Vitals reviewed.      Assessment/Plan   Rahul was seen today for influenza.    Diagnoses and all orders for this visit:    Bronchitis with influenza  -     ipratropium-albuterol (DUO-NEB) nebulizer solution 3 mL  -     albuterol sulfate  (90 Base) MCG/ACT inhaler; Inhale 2 puffs Every 6 (Six) Hours As Needed for Wheezing or Shortness of Air (or cough you cannot get under control).  -     predniSONE (DELTASONE) 10 MG (21) tablet pack; Use as directed on package  -     benzonatate (TESSALON PERLES) 100 MG capsule; Take 1 capsule by mouth 3 (Three) Times a Day As Needed for Cough for up to 10 days.    Wheezing  -     ipratropium-albuterol (DUO-NEB) nebulizer solution 3 mL  -     predniSONE (DELTASONE) 10 MG " (21) tablet pack; Use as directed on package    Influenza    Tobacco dependence    continue the tamiflu as prescribed.   Will treat for bronchitis.   Steroids, inhaler and tessalon Perles.   Go to ER for any Severe shortness of breath.   Patient instructed to increase water intake.   Patient reported breathing better after the nebulizer treatment.   Discussed stopping smoking.              Patient Instructions   Acute Bronchitis, Adult    Acute bronchitis is sudden (acute) swelling of the air tubes (bronchi) in the lungs. Acute bronchitis causes these tubes to fill with mucus, which can make it hard to breathe. It can also cause coughing or wheezing.  In adults, acute bronchitis usually goes away within 2 weeks. A cough caused by bronchitis may last up to 3 weeks. Smoking, allergies, and asthma can make the condition worse. Repeated episodes of bronchitis may cause further lung problems, such as chronic obstructive pulmonary disease (COPD).  What are the causes?  This condition can be caused by germs and by substances that irritate the lungs, including:  · Cold and flu viruses. This condition is most often caused by the same virus that causes a cold.  · Bacteria.  · Exposure to tobacco smoke, dust, fumes, and air pollution.  What increases the risk?  This condition is more likely to develop in people who:  · Have close contact with someone with acute bronchitis.  · Are exposed to lung irritants, such as tobacco smoke, dust, fumes, and vapors.  · Have a weak immune system.  · Have a respiratory condition such as asthma.  What are the signs or symptoms?  Symptoms of this condition include:  · A cough.  · Coughing up clear, yellow, or green mucus.  · Wheezing.  · Chest congestion.  · Shortness of breath.  · A fever.  · Body aches.  · Chills.  · A sore throat.  How is this diagnosed?  This condition is usually diagnosed with a physical exam. During the exam, your health care provider may order tests, such as chest  X-rays, to rule out other conditions. He or she may also:  · Test a sample of your mucus for bacterial infection.  · Check the level of oxygen in your blood. This is done to check for pneumonia.  · Do a chest X-ray or lung function testing to rule out pneumonia and other conditions.  · Perform blood tests.  Your health care provider will also ask about your symptoms and medical history.  How is this treated?  Most cases of acute bronchitis clear up over time without treatment. Your health care provider may recommend:  · Drinking more fluids. Drinking more makes your mucus thinner, which may make it easier to breathe.  · Taking a medicine for a fever or cough.  · Taking an antibiotic medicine.  · Using an inhaler to help improve shortness of breath and to control a cough.  · Using a cool mist vaporizer or humidifier to make it easier to breathe.  Follow these instructions at home:  Medicines  · Take over-the-counter and prescription medicines only as told by your health care provider.  · If you were prescribed an antibiotic, take it as told by your health care provider. Do not stop taking the antibiotic even if you start to feel better.  General instructions    · Get plenty of rest.  · Drink enough fluids to keep your urine pale yellow.  · Avoid smoking and secondhand smoke. Exposure to cigarette smoke or irritating chemicals will make bronchitis worse. If you smoke and you need help quitting, ask your health care provider. Quitting smoking will help your lungs heal faster.  · Use an inhaler, cool mist vaporizer, or humidifier as told by your health care provider.  · Keep all follow-up visits as told by your health care provider. This is important.  How is this prevented?  To lower your risk of getting this condition again:  · Wash your hands often with soap and water. If soap and water are not available, use hand .  · Avoid contact with people who have cold symptoms.  · Try not to touch your hands to your  mouth, nose, or eyes.  · Make sure to get the flu shot every year.  Contact a health care provider if:  · Your symptoms do not improve in 2 weeks of treatment.  Get help right away if:  · You cough up blood.  · You have chest pain.  · You have severe shortness of breath.  · You become dehydrated.  · You faint or keep feeling like you are going to faint.  · You keep vomiting.  · You have a severe headache.  · Your fever or chills gets worse.  This information is not intended to replace advice given to you by your health care provider. Make sure you discuss any questions you have with your health care provider.  Document Released: 01/25/2006 Document Revised: 08/01/2018 Document Reviewed: 06/07/2017  [employer] Patient Education © 2019 [employer]        Huong Henry, APRN

## 2020-01-15 NOTE — TELEPHONE ENCOUNTER
Called patient - he took 2 of the prednisone tablets and his heart started racing.     It is now back to his normal.   He has been instructed to take only one tablet at a time.   1 tablet every 8 hours x 3 days and then   1 tablet twice a day x 2 days    Follow up as needed.   Huong Henry, APRN

## 2020-01-31 DIAGNOSIS — I10 ESSENTIAL HYPERTENSION: ICD-10-CM

## 2020-01-31 RX ORDER — LISINOPRIL 5 MG/1
5 TABLET ORAL DAILY
Qty: 30 TABLET | Refills: 0 | Status: SHIPPED | OUTPATIENT
Start: 2020-01-31 | End: 2020-03-13 | Stop reason: SDUPTHER

## 2020-03-13 DIAGNOSIS — F17.200 TOBACCO DEPENDENCE: Primary | ICD-10-CM

## 2020-03-13 DIAGNOSIS — I10 ESSENTIAL HYPERTENSION: ICD-10-CM

## 2020-03-13 DIAGNOSIS — F17.200 TOBACCO DEPENDENCE: ICD-10-CM

## 2020-03-13 RX ORDER — LISINOPRIL 5 MG/1
5 TABLET ORAL DAILY
Qty: 30 TABLET | Refills: 0 | Status: SHIPPED | OUTPATIENT
Start: 2020-03-13 | End: 2020-03-30

## 2020-03-23 ENCOUNTER — TELEPHONE (OUTPATIENT)
Dept: FAMILY MEDICINE CLINIC | Facility: CLINIC | Age: 30
End: 2020-03-23

## 2020-03-23 NOTE — TELEPHONE ENCOUNTER
It was sent as a starting dose pack as patient had requested but someone may have already had symptoms while I was on vacation.

## 2020-03-23 NOTE — TELEPHONE ENCOUNTER
Samantha says they received a refill for Chantix strarting month maxi today.  They say they filled the starting month maxi 10 days ago and didn't know if this should have been for Chantix continuing month maxi.  Samantha is requesting call back for clarification.

## 2020-03-24 DIAGNOSIS — F17.200 TOBACCO DEPENDENCE: ICD-10-CM

## 2020-03-24 RX ORDER — VARENICLINE TARTRATE 1 MG/1
1 TABLET, FILM COATED ORAL 2 TIMES DAILY
Qty: 56 TABLET | Refills: 1 | Status: SHIPPED | OUTPATIENT
Start: 2020-03-24 | End: 2020-06-08 | Stop reason: SDUPTHER

## 2020-03-24 NOTE — TELEPHONE ENCOUNTER
[facility] CALLING THEY NEED A PRESCRIPTION FOR THE CONTINUING PACK  FOR    CHANTIX     PLEASE ADVISE AND GIVE CALL [phone] PH

## 2020-03-27 RX ORDER — PANTOPRAZOLE SODIUM 40 MG/1
40 TABLET, DELAYED RELEASE ORAL DAILY
Qty: 90 TABLET | Refills: 0 | Status: SHIPPED | OUTPATIENT
Start: 2020-03-27 | End: 2020-06-08

## 2020-03-29 DIAGNOSIS — I10 ESSENTIAL HYPERTENSION: ICD-10-CM

## 2020-03-30 RX ORDER — LISINOPRIL 5 MG/1
TABLET ORAL
Qty: 30 TABLET | Refills: 2 | Status: SHIPPED | OUTPATIENT
Start: 2020-03-30 | End: 2020-07-22 | Stop reason: SDUPTHER

## 2020-04-17 ENCOUNTER — E-VISIT (OUTPATIENT)
Dept: FAMILY MEDICINE CLINIC | Facility: CLINIC | Age: 30
End: 2020-04-17

## 2020-04-17 DIAGNOSIS — J02.9 PHARYNGITIS, UNSPECIFIED ETIOLOGY: Primary | ICD-10-CM

## 2020-04-17 PROCEDURE — 99421 OL DIG E/M SVC 5-10 MIN: CPT | Performed by: FAMILY MEDICINE

## 2020-04-17 RX ORDER — AMOXICILLIN 500 MG/1
500 CAPSULE ORAL 2 TIMES DAILY
Qty: 20 CAPSULE | Refills: 0 | Status: SHIPPED | OUTPATIENT
Start: 2020-04-17 | End: 2020-05-12

## 2020-04-17 NOTE — ASSESSMENT & PLAN NOTE
Patient has what sounds like pharyngitis.  He will be given amoxicillin for treatment.  RTC precautions given.

## 2020-04-17 NOTE — PROGRESS NOTES
Rahul Vasquez Romeo    1990  [id number]    I have reviewed the e-Visit questionnaire and patient's answers, my assessment and plan are as follows:    HPI  Sinus Problems E-Visit     Question 4/17/2020  1:21 PM EDT - Filed by Patient on 4/17/2020   What state are you submitting this questionnaire from? [place]   Do you currently have the following symptoms? No   In the last 14 days, have you had contact with anyone known to have the 2019 Novel Coronavirus or anyone being tested for the 2019 Novel Coronavirus? No   Have you experienced a sudden onset of loss of taste or smell? No   Which of the following have you been experiencing? Tenderness in the front/back of the neck    Sore or scratchy throat   Have you had any of the following? Difficulty swallowing   Please enter a few details about your swallowing, breathing, or visual problems. On my left side of my neck there's a little bit of discomfort and when I'm swallowing I can feel it around my neck   How long have you been having these symptoms? For a few days   Do you have a fever? No, I do not have a fever   Do you smoke? Yes   Do you have any chronic illnesses, such as diabetes, heart disease, lung disease, autoimmune disease, or any illness that would weaken your body's ability to fight infection? No   Have you experienced similar problems in the past? Yes   What treatments have worked in the past? Antibiotics   What treatments haven't worked?    Have you recently been hospitalized? No   What medications or other treamtents are you currently using for these symptoms? Pain medicine   Please enter the names of any medications you are taking, or any other treatments you are trying. Tylenol and ibuprofen   Have you had sinus surgery in the past? No   Anything else you would like to add?      Tightness and pian with swallowing. Same with solids and liquids. No change in symptoms. No palpable swelling. No SOA or difficulty swallowing. Has had similar  problem in the past that resolved with antibiotics. He believes it was antibiotics. No nasal congestion, sinus pressure. He has mild productive cough he does not know color of mucus because he swallows it without looking.     Approximately 10 minutes was spent discussing patient's symptoms with him over the phone as well as reviewing his answers to the questionnaire and his chart.    Review of Systems - General ROS: negative for - chills, fatigue or fever  HEENT: Positive for- tightness with swallowing, sore throat  Lungs: Positive for-productive cough, negative for-shortness of air.  Cardio: Negative for-chest pain, palpitations, edema  GI: Negative for-nausea, vomiting, diarrhea, constipation    Diagnoses and all orders for this visit:    Pharyngitis, unspecified etiology    Patient has symptoms suggestive of pharyngitis.  He was given amoxicillin for treatment.  RTC precautions given.    Any medications prescribed have been sent electronically to   [facility] [address] [place] [address] - [address] [place] [address] AT [facility] [address] - [phone]  - [phone] [place] [address]  Phone: [phone] Fax: [phone]        Boom Benoit MD  04/17/20  4:20 PM

## 2020-05-11 ENCOUNTER — E-VISIT (OUTPATIENT)
Dept: FAMILY MEDICINE CLINIC | Facility: CLINIC | Age: 30
End: 2020-05-11

## 2020-05-11 DIAGNOSIS — J02.9 PHARYNGITIS, UNSPECIFIED ETIOLOGY: Primary | ICD-10-CM

## 2020-05-11 PROCEDURE — 99422 OL DIG E/M SVC 11-20 MIN: CPT | Performed by: FAMILY MEDICINE

## 2020-05-12 RX ORDER — AMOXICILLIN AND CLAVULANATE POTASSIUM 875; 125 MG/1; MG/1
1 TABLET, FILM COATED ORAL 2 TIMES DAILY
Qty: 20 TABLET | Refills: 0 | Status: SHIPPED | OUTPATIENT
Start: 2020-05-12 | End: 2020-05-22

## 2020-05-12 RX ORDER — AMOXICILLIN AND CLAVULANATE POTASSIUM 875; 125 MG/1; MG/1
1 TABLET, FILM COATED ORAL 2 TIMES DAILY
Qty: 20 TABLET | Refills: 0 | Status: SHIPPED | OUTPATIENT
Start: 2020-05-12 | End: 2020-05-12

## 2020-05-12 NOTE — PROGRESS NOTES
Rahul Yuard    1990  [id number]    I have reviewed the e-Visit questionnaire and patient's answers, my assessment and plan are as follows:    HPI   Sinus Problems E-Visit     Question 5/11/2020  2:05 PM EDT - Filed by Patient on 5/11/2020   What state are you submitting this questionnaire from? [place]   Do you currently have the following symptoms? Unknown   In the last 14 days, have you had contact with anyone known to have the 2019 Novel Coronavirus or anyone being tested for the 2019 Novel Coronavirus? No   Have you experienced a sudden onset of loss of taste or smell? No   Which of the following have you been experiencing? Sore or scratchy throat   Have you had any of the following? Difficulty swallowing   Please enter a few details about your swallowing, breathing, or visual problems. My spit feel warm some what hurts when I swallow and been dizzy   How long have you been having these symptoms? For a few days   Do you have a fever? No, I do not have a fever   Do you smoke? Yes   Do you have any chronic illnesses, such as diabetes, heart disease, lung disease, autoimmune disease, or any illness that would weaken your body's ability to fight infection? No   Have you experienced similar problems in the past? Yes   What treatments have worked in the past? Antibiotics   What treatments haven't worked?    Have you recently been hospitalized? No   What medications or other treamtents are you currently using for these symptoms? Pain medicine   Please enter the names of any medications you are taking, or any other treatments you are trying. Not sure   Have you had sinus surgery in the past? No   Anything else you would like to add?      Approximately 15 minutes was spent reviewing patient's questionnaire, medical record, and formulating plan of care.    Review of Systems - General ROS: negative for - chills, fatigue or fever  Lungs: Negative for shortness of breath cough  Heart: Negative for chest  pain palpitations  HEENT: Positive for sore throat    Diagnoses and all orders for this visit:    Pharyngitis, unspecified etiology  -     amoxicillin-clavulanate (Augmentin) 875-125 MG per tablet; Take 1 tablet by mouth 2 (Two) Times a Day for 10 days.    Other orders  -     Discontinue: amoxicillin-clavulanate (Augmentin) 875-125 MG per tablet; Take 1 tablet by mouth 2 (Two) Times a Day for 10 days.      After reviewing signs and symptoms from patient questionnaire seems that he has recurrent pharyngitis.  Patient completed of course of amoxicillin previously.  Will give course of Augmentin at this time and encouraged him to continue supportive care.  If symptoms persist or recur he should schedule an appointment to be seen in the office.    Any medications prescribed have been sent electronically to   [facility] [address] [place] [address] - [address] [place] [address] AT [facility] [address] - [phone] [place] - [phone] [address] [address]  Phone: [phone] Fax: [phone]        Boom Benoit MD  05/12/20  12:31 PM

## 2020-05-12 NOTE — ASSESSMENT & PLAN NOTE
After reviewing signs and symptoms from patient questionnaire seems that he has recurrent pharyngitis.  Patient completed of course of amoxicillin previously.  Will give course of Augmentin at this time and encouraged him to continue supportive care.  If symptoms persist or recur he should schedule an appointment to be seen in the office.

## 2020-06-04 ENCOUNTER — TELEPHONE (OUTPATIENT)
Dept: FAMILY MEDICINE CLINIC | Facility: CLINIC | Age: 30
End: 2020-06-04

## 2020-06-04 RX ORDER — NICOTINE 21 MG/24HR
1 PATCH, TRANSDERMAL 24 HOURS TRANSDERMAL EVERY 24 HOURS
Qty: 28 PATCH | Refills: 1 | Status: SHIPPED | OUTPATIENT
Start: 2020-06-04 | End: 2021-11-17

## 2020-06-04 NOTE — TELEPHONE ENCOUNTER
PT STATES THAT HE WAS GIVEN THE SAME BRAND OF NICOTINE PATCHES THAT HE IS ALLERGIC TOO.      PLEASE ADVISE @ [phone]

## 2020-06-04 NOTE — TELEPHONE ENCOUNTER
PATIENT STATES DR BANSAL PRESCRIBED HIM NICOTINE PATCHES BACK IN January.  PATIENT STATES HE DID NOT USE ANY OF THE PATCHES UNTIL THIS MORNING (6/4) .  HE PUT A PATCH ON AT 7:30 AM AND TWO HOURS LATER HE HAD TO REMOVE THE PAIN BECAUSE OF PAIN, ITCHING AND REDNESS.  BECAUSE HE HAS NEVER USED THESE BEFORE, HE IS UNSURE IF THIS IS NORMAL AND WANTS A CALL BACK.    PLEASE CALL [phone]

## 2020-06-04 NOTE — TELEPHONE ENCOUNTER
This is not a normal reaction.  He is likely allergic to something in the patch or the adhesive in the patch.  He should discontinue use immediately. He may try different brand of patches if he still wishes to quit smoking. He could also try nicotine gum or lozenges.  Or we can discuss starting him on Chantix.

## 2020-06-04 NOTE — TELEPHONE ENCOUNTER
Sent an order to pharmacy today.  Will be up to them whether or not they have a different brand that he can use.

## 2020-06-05 NOTE — TELEPHONE ENCOUNTER
Called pt about nicotine patches and pt stated that he received a different brand from the pharmacy.

## 2020-06-08 DIAGNOSIS — F17.200 TOBACCO DEPENDENCE: ICD-10-CM

## 2020-06-08 RX ORDER — VARENICLINE TARTRATE 1 MG/1
1 TABLET, FILM COATED ORAL 2 TIMES DAILY
Qty: 56 TABLET | Refills: 1 | Status: SHIPPED | OUTPATIENT
Start: 2020-06-08 | End: 2020-08-17

## 2020-06-08 RX ORDER — PANTOPRAZOLE SODIUM 40 MG/1
TABLET, DELAYED RELEASE ORAL
Qty: 90 TABLET | Refills: 0 | Status: SHIPPED | OUTPATIENT
Start: 2020-06-08 | End: 2020-08-24

## 2020-06-24 RX ORDER — PANTOPRAZOLE SODIUM 40 MG/1
TABLET, DELAYED RELEASE ORAL
Qty: 90 TABLET | Refills: 0 | OUTPATIENT
Start: 2020-06-24

## 2020-06-26 ENCOUNTER — TELEMEDICINE (OUTPATIENT)
Dept: FAMILY MEDICINE CLINIC | Facility: CLINIC | Age: 30
End: 2020-06-26

## 2020-06-26 DIAGNOSIS — R42 DIZZINESS OF UNKNOWN CAUSE: Primary | ICD-10-CM

## 2020-06-26 PROCEDURE — 99213 OFFICE O/P EST LOW 20 MIN: CPT | Performed by: FAMILY MEDICINE

## 2020-06-29 DIAGNOSIS — R42 DIZZINESS OF UNKNOWN CAUSE: ICD-10-CM

## 2020-06-29 DIAGNOSIS — F17.200 TOBACCO DEPENDENCE: Primary | ICD-10-CM

## 2020-07-08 ENCOUNTER — TELEMEDICINE (OUTPATIENT)
Dept: FAMILY MEDICINE CLINIC | Facility: CLINIC | Age: 30
End: 2020-07-08

## 2020-07-08 DIAGNOSIS — B34.9 ACUTE VIRAL SYNDROME: Primary | ICD-10-CM

## 2020-07-08 PROCEDURE — 99213 OFFICE O/P EST LOW 20 MIN: CPT | Performed by: FAMILY MEDICINE

## 2020-07-08 NOTE — PATIENT INSTRUCTIONS
Viral Respiratory Infection  A respiratory infection is an illness that affects part of the respiratory system, such as the lungs, nose, or throat. A respiratory infection that is caused by a virus is called a viral respiratory infection.  Common types of viral respiratory infections include:  · A cold.  · The flu (influenza).  · A respiratory syncytial virus (RSV) infection.  What are the causes?  This condition is caused by a virus.  What are the signs or symptoms?  Symptoms of this condition include:  · A stuffy or runny nose.  · Yellow or green nasal discharge.  · A cough.  · Sneezing.  · Fatigue.  · Achy muscles.  · A sore throat.  · Sweating or chills.  · A fever.  · A headache.  How is this diagnosed?  This condition may be diagnosed based on:  · Your symptoms.  · A physical exam.  · Testing of nasal swabs.  How is this treated?  This condition may be treated with medicines, such as:  · Antiviral medicine. This may shorten the length of time a person has symptoms.  · Expectorants. These make it easier to cough up mucus.  · Decongestant nasal sprays.  · Acetaminophen or NSAIDs to relieve fever and pain.  Antibiotic medicines are not prescribed for viral infections. This is because antibiotics are designed to kill bacteria. They are not effective against viruses.  Follow these instructions at home:    Managing pain and congestion  · Take over-the-counter and prescription medicines only as told by your health care provider.  · If you have a sore throat, gargle with a salt-water mixture 3-4 times a day or as needed. To make a salt-water mixture, completely dissolve ½-1 tsp of salt in 1 cup of warm water.  · Use nose drops made from salt water to ease congestion and soften raw skin around your nose.  · Drink enough fluid to keep your urine pale yellow. This helps prevent dehydration and helps loosen up mucus.  General instructions  · Rest as much as possible.  · Do not drink alcohol.  · Do not use any products  that contain nicotine or tobacco, such as cigarettes and e-cigarettes. If you need help quitting, ask your health care provider.  · Keep all follow-up visits as told by your health care provider. This is important.  How is this prevented?    · Get an annual flu shot. You may get the flu shot in late summer, fall, or winter. Ask your health care provider when you should get your flu shot.  · Avoid exposing others to your respiratory infection.  ? Stay home from work or school as told by your health care provider.  ? Wash your hands with soap and water often, especially after you cough or sneeze. If soap and water are not available, use alcohol-based hand .  · Avoid contact with people who are sick during cold and flu season. This is generally fall and winter.  Contact a health care provider if:  · Your symptoms last for 10 days or longer.  · Your symptoms get worse over time.  · You have a fever.  · You have severe sinus pain in your face or forehead.  · The glands in your jaw or neck become very swollen.  Get help right away if you:  · Feel pain or pressure in your chest.  · Have shortness of breath.  · Faint or feel like you will faint.  · Have severe and persistent vomiting.  · Feel confused or disoriented.  Summary  · A respiratory infection is an illness that affects part of the respiratory system, such as the lungs, nose, or throat. A respiratory infection that is caused by a virus is called a viral respiratory infection.  · Common types of viral respiratory infections are a cold, influenza, and respiratory syncytial virus (RSV) infection.  · Symptoms of this condition include a stuffy or runny nose, cough, sneezing, fatigue, achy muscles, sore throat, and fevers or chills.  · Antibiotic medicines are not prescribed for viral infections. This is because antibiotics are designed to kill bacteria. They are not effective against viruses.  This information is not intended to replace advice given to you by  your health care provider. Make sure you discuss any questions you have with your health care provider.  Document Released: 09/27/2006 Document Revised: 01/28/2019 Document Reviewed: 01/28/2019  [employer] Interactive Patient Education © 2019 [employer]      COVID-19  COVID-19 is a respiratory infection that is caused by a virus called severe acute respiratory syndrome coronavirus 2 (SARS-CoV-2). The disease is also known as coronavirus disease or novel coronavirus. In some people, the virus may not cause any symptoms. In others, it may cause a serious infection. The infection can get worse quickly and can lead to complications, such as:  · Pneumonia, or infection of the lungs.  · Acute respiratory distress syndrome or ARDS. This is fluid build-up in the lungs.  · Acute respiratory failure. This is a condition in which there is not enough oxygen passing from the lungs to the body.  · Sepsis or septic shock. This is a serious bodily reaction to an infection.  · Blood clotting problems.  · Secondary infections due to bacteria or fungus.  The virus that causes COVID-19 is contagious. This means that it can spread from person to person through droplets from coughs and sneezes (respiratory secretions).  What are the causes?  This illness is caused by a virus. You may catch the virus by:  · Breathing in droplets from an infected person's cough or sneeze.  · Touching something, like a table or a doorknob, that was exposed to the virus (contaminated) and then touching your mouth, nose, or eyes.  What increases the risk?  Risk for infection  You are more likely to be infected with this virus if you:  · Live in or travel to an area with a COVID-19 outbreak.  · Come in contact with a sick person who recently traveled to an area with a COVID-19 outbreak.  · Provide care for or live with a person who is infected with COVID-19.  Risk for serious illness  You are more likely to become seriously ill from the virus if you:  · Are  65 years of age or older.  · Have a long-term disease that lowers your body's ability to fight infection (immunocompromised).  · Live in a nursing home or long-term care facility.  · Have a long-term (chronic) disease such as:  ? Chronic lung disease, including chronic obstructive pulmonary disease or asthma  ? Heart disease.  ? Diabetes.  ? Chronic kidney disease.  ? Liver disease.  · Are obese.  What are the signs or symptoms?  Symptoms of this condition can range from mild to severe. Symptoms may appear any time from 2 to 14 days after being exposed to the virus. They include:  · A fever.  · A cough.  · Difficulty breathing.  · Chills.  · Muscle pains.  · A sore throat.  · Loss of taste or smell.  Some people may also have stomach problems, such as nausea, vomiting, or diarrhea.  Other people may not have any symptoms of COVID-19.  How is this diagnosed?  This condition may be diagnosed based on:  · Your signs and symptoms, especially if:  ? You live in an area with a COVID-19 outbreak.  ? You recently traveled to or from an area where the virus is common.  ? You provide care for or live with a person who was diagnosed with COVID-19.  · A physical exam.  · Lab tests, which may include:  ? A nasal swab to take a sample of fluid from your nose.  ? A throat swab to take a sample of fluid from your throat.  ? A sample of mucus from your lungs (sputum).  ? Blood tests.  · Imaging tests, which may include, X-rays, CT scan, or ultrasound.  How is this treated?  At present, there is no medicine to treat COVID-19. Medicines that treat other diseases are being used on a trial basis to see if they are effective against COVID-19.  Your health care provider will talk with you about ways to treat your symptoms. For most people, the infection is mild and can be managed at home with rest, fluids, and over-the-counter medicines.  Treatment for a serious infection usually takes places in a hospital intensive care unit (ICU). It  may include one or more of the following treatments. These treatments are given until your symptoms improve.  · Receiving fluids and medicines through an IV.  · Supplemental oxygen. Extra oxygen is given through a tube in the nose, a face mask, or a price.  · Positioning you to lie on your stomach (prone position). This makes it easier for oxygen to get into the lungs.  · Continuous positive airway pressure (CPAP) or bi-level positive airway pressure (BPAP) machine. This treatment uses mild air pressure to keep the airways open. A tube that is connected to a motor delivers oxygen to the body.  · Ventilator. This treatment moves air into and out of the lungs by using a tube that is placed in your windpipe.  · Tracheostomy. This is a procedure to create a hole in the neck so that a breathing tube can be inserted.  · Extracorporeal membrane oxygenation (ECMO). This procedure gives the lungs a chance to recover by taking over the functions of the heart and lungs. It supplies oxygen to the body and removes carbon dioxide.  Follow these instructions at home:  Lifestyle  · If you are sick, stay home except to get medical care. Your health care provider will tell you how long to stay home. Call your health care provider before you go for medical care.  · Rest at home as told by your health care provider.  · Do not use any products that contain nicotine or tobacco, such as cigarettes, e-cigarettes, and chewing tobacco. If you need help quitting, ask your health care provider.  · Return to your normal activities as told by your health care provider. Ask your health care provider what activities are safe for you.  General instructions  · Take over-the-counter and prescription medicines only as told by your health care provider.  · Drink enough fluid to keep your urine pale yellow.  · Keep all follow-up visits as told by your health care provider. This is important.  How is this prevented?    There is no vaccine to help prevent  COVID-19 infection. However, there are steps you can take to protect yourself and others from this virus.  To protect yourself:   · Do not travel to areas where COVID-19 is a risk. The areas where COVID-19 is reported change often. To identify high-risk areas and travel restrictions, check the [facility] travel website: wwwnc.cdc.gov/travel/notices  · If you live in, or must travel to, an area where COVID-19 is a risk, take precautions to avoid infection.  ? Stay away from people who are sick.  ? Wash your hands often with soap and water for 20 seconds. If soap and water are not available, use an alcohol-based hand .  ? Avoid touching your mouth, face, eyes, or nose.  ? Avoid going out in public, follow guidance from your state and local health authorities.  ? If you must go out in public, wear a cloth face covering or face mask.  ? Disinfect objects and surfaces that are frequently touched every day. This may include:  § Counters and tables.  § Doorknobs and light switches.  § Sinks and faucets.  § Electronics, such as phones, remote controls, keyboards, computers, and tablets.  To protect others:  If you have symptoms of COVID-19, take steps to prevent the virus from spreading to others.  · If you think you have a COVID-19 infection, contact your health care provider right away. Tell your health care team that you think you may have a COVID-19 infection.  · Stay home. Leave your house only to seek medical care. Do not use public transport.  · Do not travel while you are sick.  · Wash your hands often with soap and water for 20 seconds. If soap and water are not available, use alcohol-based hand .  · Stay away from other members of your household. Let healthy household members care for children and pets, if possible. If you have to care for children or pets, wash your hands often and wear a mask. If possible, stay in your own room, separate from others. Use a different bathroom.  · Make sure that all  people in your household wash their hands well and often.  · Cough or sneeze into a tissue or your sleeve or elbow. Do not cough or sneeze into your hand or into the air.  · Wear a cloth face covering or face mask.  Where to find more information  · Centers for Disease Control and Prevention: www.cdc.gov/coronavirus/2019-ncov/index.html  · World Health Organization: www.who.int/health-topics/coronavirus  Contact a health care provider if:  · You live in or have traveled to an area where COVID-19 is a risk and you have symptoms of the infection.  · You have had contact with someone who has COVID-19 and you have symptoms of the infection.  Get help right away if:  · You have trouble breathing.  · You have pain or pressure in your chest.  · You have confusion.  · You have bluish lips and fingernails.  · You have difficulty waking from sleep.  · You have symptoms that get worse.  These symptoms may represent a serious problem that is an emergency. Do not wait to see if the symptoms will go away. Get medical help right away. Call your local emergency services (911 in the [place] Do not drive yourself to the hospital. Let the emergency medical personnel know if you think you have COVID-19.  Summary  · COVID-19 is a respiratory infection that is caused by a virus. It is also known as coronavirus disease or novel coronavirus. It can cause serious infections, such as pneumonia, acute respiratory distress syndrome, acute respiratory failure, or sepsis.  · The virus that causes COVID-19 is contagious. This means that it can spread from person to person through droplets from coughs and sneezes.  · You are more likely to develop a serious illness if you are 65 years of age or older, have a weak immunity, live in a nursing home, or have chronic disease.  · There is no medicine to treat COVID-19. Your health care provider will talk with you about ways to treat your symptoms.  · Take steps to protect yourself and others from  infection. Wash your hands often and disinfect objects and surfaces that are frequently touched every day. Stay away from people who are sick and wear a mask if you are sick.  This information is not intended to replace advice given to you by your health care provider. Make sure you discuss any questions you have with your health care provider.  Document Released: 01/23/2020 Document Revised: 05/14/2020 Document Reviewed: 01/23/2020  [employer]vier Patient Education © 2020 [employer]

## 2020-07-08 NOTE — ASSESSMENT & PLAN NOTE
Patient will continue supportive care for viral illness.  Patient was instructed to self quarantine.  Patient will go have COVID-19 testing performed at closest urgent treatment center.  RTC precautions given.

## 2020-07-09 ENCOUNTER — TELEMEDICINE (OUTPATIENT)
Dept: FAMILY MEDICINE CLINIC | Facility: TELEHEALTH | Age: 30
End: 2020-07-09

## 2020-07-09 DIAGNOSIS — K04.7 DENTAL INFECTION: Primary | ICD-10-CM

## 2020-07-09 PROCEDURE — 99213 OFFICE O/P EST LOW 20 MIN: CPT | Performed by: NURSE PRACTITIONER

## 2020-07-09 RX ORDER — AMOXICILLIN AND CLAVULANATE POTASSIUM 875; 125 MG/1; MG/1
1 TABLET, FILM COATED ORAL 2 TIMES DAILY
Qty: 20 TABLET | Refills: 0 | Status: SHIPPED | OUTPATIENT
Start: 2020-07-09 | End: 2020-07-19

## 2020-07-09 RX ORDER — IBUPROFEN 800 MG/1
800 TABLET ORAL EVERY 6 HOURS PRN
Qty: 40 TABLET | Refills: 0 | Status: SHIPPED | OUTPATIENT
Start: 2020-07-09 | End: 2021-11-17

## 2020-07-09 NOTE — PROGRESS NOTES
CHIEF COMPLAINT  No chief complaint on file.        HPI  Rahul Walters is a 29 y.o. male  presents with complaint of 3 day history of dental pain on right jaw upper and lower gums; painful to eat;  pain is described as throbbing alternating with aching; pain is constant, pain level is 5/10; has been taking tylenol; mild tenderness in right lymph node under jaw    Review of Systems   Constitutional: Positive for appetite change. Negative for fever.   HENT: Positive for dental problem.    Hematological: Positive for adenopathy.   All other systems reviewed and are negative.         Past Medical History:   Diagnosis Date   • Dental caries    • GERD (gastroesophageal reflux disease)    • Gingivitis    • HTN (hypertension)    • Obesity    • Periodontal disease    • Tobacco dependence        Family History   Problem Relation Age of Onset   • Heart attack Mother    • Coronary artery disease Mother    • Stroke Father    • Hypertension Father        Social History     Socioeconomic History   • Marital status:      Spouse name: Suni   • Number of children: 2   • Years of education: H.S.   • Highest education level: High school graduate   Occupational History   • Occupation:      Employer: UNEMPLOYED   Tobacco Use   • Smoking status: Current Every Day Smoker     Packs/day: 1.50     Years: 12.00     Pack years: 18.00     Types: Cigarettes   • Smokeless tobacco: Never Used   Substance and Sexual Activity   • Alcohol use: No   • Drug use: No   • Sexual activity: Yes     Partners: Female     Birth control/protection: Tubal ligation         There were no vitals taken for this visit.    PHYSICAL EXAM  Physical Exam   Constitutional: He is oriented to person, place, and time. He appears well-developed and well-nourished.   HENT:   Head: Normocephalic and atraumatic.   Per patient--constant throbbing/aching pain along upper and lower right gums/teeth worsens with touching or chewing   Neurological: He  is alert and oriented to person, place, and time.         Diagnoses and all orders for this visit:    Dental infection  -     amoxicillin-clavulanate (AUGMENTIN) 875-125 MG per tablet; Take 1 tablet by mouth 2 (Two) Times a Day for 10 days.  -     ibuprofen (ADVIL,MOTRIN) 800 MG tablet; Take 1 tablet by mouth Every 6 (Six) Hours As Needed for Moderate Pain .  --complete entire course of Augmentin  --ibuprofen for pain and inflammation  --warm salt water or Listerine rinses   --schedule with dentist in next week for evaluation    **advised to seek immediate care if worsening pain, difficulty opening mouth or swallowing, fever, pain radiating     **if no improvement in 3-5 day, advised to follow-up with PCP or dentist for further evaluation      FOLLOW-UP  As discussed with PCP/Virtual Care if no improvement or Urgent Care/Emergency Department if worsening of symptoms    Patient verbalizes understanding of medication dosage, comfort measures, instructions for treatment and follow-up.    KRIS Hernandez  07/09/2020  10:18 AM    This visit was performed via Telehealth.  This patient has been instructed to follow-up with their primary care provider if their symptoms worsen or the treatment provided does not resolve their illness.

## 2020-07-09 NOTE — PATIENT INSTRUCTIONS
Dental Abscess    A dental abscess is a collection of pus in or around a tooth that results from an infection. An abscess can cause pain in the affected area as well as other symptoms. Treatment is important to help with symptoms and to prevent the infection from spreading.  What are the causes?  This condition is caused by a bacterial infection around the root of the tooth that involves the inner part of the tooth (pulp). It may result from:  · Severe tooth decay.  · Trauma to the tooth, such as a broken or chipped tooth, that allows bacteria to enter into the pulp.  · Severe gum disease around a tooth.  What increases the risk?  This condition is more likely to develop in males. It is also more likely to develop in people who:  · Have dental decay (cavities).  · Eat sugary snacks between meals.  · Use tobacco products.  · Have diabetes.  · Have a weakened disease-fighting system (immune system).  · Do not brush and care for their teeth regularly.  What are the signs or symptoms?  Symptoms of this condition include:  · Severe pain in and around the infected tooth.  · Swelling and redness around the infected tooth, in the mouth, or in the face.  · Tenderness.  · Pus drainage.  · Bad breath.  · Bitter taste in the mouth.  · Difficulty swallowing.  · Difficulty opening the mouth.  · Nausea.  · Vomiting.  · Chills.  · Swollen neck glands.  · Fever.  How is this diagnosed?  This condition is diagnosed based on:  · Your symptoms and your medical and dental history.  · An examination of the infected tooth. During the exam, your dentist may tap on the infected tooth.  You may also have X-rays of the affected area.  How is this treated?  This condition is treated by getting rid of the infection. This may be done with:  · Incision and drainage. This procedure is done by making an incision in the abscess to drain out the pus. Removing pus is the first priority in treating an abscess.  · Antibiotic medicines. These may be used  in certain situations.  · Antibacterial mouth rinse.  · A root canal. This may be performed to save the tooth. Your dentist accesses the visible part of your tooth (crown) with a drill and removes any damaged pulp. Then the space is filled and sealed off.  · Tooth extraction. The tooth is pulled out if it cannot be saved by other treatment.  You may also receive treatment for pain, such as:  · Acetaminophen or NSAIDs.  · Gels that contain a numbing medicine.  · An injection to block the pain near your nerve.  Follow these instructions at home:  Medicines  · Take over-the-counter and prescription medicines only as told by your dentist.  · If you were prescribed an antibiotic, take it as told by your dentist. Do not stop taking the antibiotic even if you start to feel better.  · If you were prescribed a gel that contains a numbing medicine, use it exactly as told in the directions. Do not use these gels for children who are younger than 2 years of age.  · Do not drive or use heavy machinery while taking prescription pain medicine.  General instructions  · Rinse out your mouth often with salt water to relieve pain or swelling. To make a salt-water mixture, completely dissolve ½-1 tsp of salt in 1 cup of warm water.  · Eat a soft diet while your abscess is healing.  · Drink enough fluid to keep your urine pale yellow.  · Do not apply heat to the outside of your mouth.  · Do not use any products that contain nicotine or tobacco, such as cigarettes and e-cigarettes. If you need help quitting, ask your health care provider.  · Keep all follow-up visits as told by your dentist. This is important.  How is this prevented?  · Brush your teeth every morning and night with fluoride toothpaste. Floss one time each day.  · Get regularly scheduled dental cleanings.  · Consider having a dental sealant applied on teeth that have deep holes (caries).  · Drink fluoridated water regularly. This includes most tap water. Check the label  on bottled water to see if it contains fluoride.  · Drink water instead of sugary drinks.  · Eat healthy meals and snacks.  · Wear a mouth guard or face shield to protect your teeth while playing sports.  Contact a health care provider if:  · Your pain is worse and is not helped by medicine.  Get help right away if:  · You have a fever or chills.  · Your symptoms suddenly get worse.  · You have a very bad headache.  · You have problems breathing or swallowing.  · You have trouble opening your mouth.  · You have swelling in your neck or around your eye.  Summary  · A dental abscess is a collection of pus in or around a tooth that results from an infection.  · A dental abscess may result from severe tooth decay, trauma to the tooth, or severe gum disease around a tooth.  · Symptoms include severe pain, swelling, redness, and drainage of pus in and around the infected tooth.  · The first priority in treating a dental abscess is to drain out the pus. Treatment may also involve removing damage inside the tooth (root canal) or pulling out (extracting) the tooth.  This information is not intended to replace advice given to you by your health care provider. Make sure you discuss any questions you have with your health care provider.  Document Released: 12/18/2006 Document Revised: 11/30/2018 Document Reviewed: 08/20/2018  [employer] Patient Education © 2020 [employer]    Preventive Dental Care, Adult  Preventive dental care includes seeing a dentist regularly and practicing good dental care (oral hygiene) at home. These actions can help to prevent cavities, root canal problems, gum disease (gingivitis), tooth loss, and other tooth problems. Regular dental exams may also help your health care provider diagnose other medical problems. Many diseases, including mouth cancers, have early signs that can be found during a preventive dental care visit.  What can I expect for my preventive dental care visit?  Counseling  At your  visit, your dentist may ask you about:  · Your overall health and diet.  · Any new symptoms, such as:  ? Bleeding gums.  ? Mouth, tooth, or jaw pain.  ? Dull headache.  · Using a mouthguard for sports or because of teeth grinding.  · The need or desire to get braces to straighten teeth (orthodontic care).  Physical exam  Your dentist will do a mouth (oral) exam to check for:  · Cavities.  · Gum disease or other problems.  · Signs of cancer.  · Neck swelling or lumps.  · Abnormal jaw movement or pain in the jaw joint.  · Signs of teeth grinding, such as loose or fractured teeth.  Other services  You may also have:  · Dental X-rays.  · Your teeth cleaned.  Follow these instructions at home:  Oral health         · Brush with fluoride toothpaste every morning and night. If possible, brush within 10 minutes after every meal. Ask your dentist for toothpaste recommendations.  · Floss at least one time every day.  · Check your teeth for white or brown spots after brushing. These may be signs of cavities.  · Check your gums for swelling or bleeding. These may be signs of gum disease.  · Take over-the-counter and prescription medicines only as told by your dentist.  · If you have a permanent tooth knocked out:  ? Find the tooth.  ? Pick it up by the top (crown) with a tissue or gauze.  ? Wash the tooth for no more than 10 seconds under cold, running water.  ? Try to put the tooth back into the gum socket.  ? Put the tooth in a glass of milk if you cannot get it back in place.  ? Go to your dentist or an emergency department right away. Take the tooth with you.  Eating and drinking  · Eat a diet that includes plenty of fruits, vegetables, milk and dairy products, whole grains, and proteins. Do not eat a lot of starchy foods or foods with added sugar. Talk with your health care provider if you have questions about following a healthy diet.  · Avoid sodas, sugary snacks, and sticky candies.  · Choose water or milk instead of  fruit juice, sodas, or sports drinks.  General instructions  · Do not use any products that contain nicotine or tobacco, such as cigarettes, e-cigarettes, and chewing tobacco. If you need help quitting, ask your health care provider.  · Do not get mouth piercings.  · Always wear a mouthguard when playing contact or collision sports.  For more information:  · American Dental Association: www.mouthhealthy.org  Contact a dentist if you have:  · Gum, tooth, or jaw pain.  · Red, swollen, or bleeding gums.  · A tooth or teeth that are very sensitive to hot or cold.  · Very bad breath.  · A problem with a filling, crown, implant, or denture.  · A broken or loose tooth.  · A growth or sore in your mouth that is not going away.  · A dry mouth.  What's next?  · See your dentist one or two times each year for oral exams and cleanings.  · If you have an early problem, like a cavity, your dentist will schedule time for you to get treatment. If you have a tooth root problem, gum disease, or a sign of another disease, your dentist may send you to see another health care provider for care.  This information is not intended to replace advice given to you by your health care provider. Make sure you discuss any questions you have with your health care provider.  Document Released: 09/07/2016 Document Revised: 10/11/2019 Document Reviewed: 08/05/2019  [employer] Patient Education © 2020 [employer]

## 2020-07-12 PROBLEM — R42 DIZZINESS OF UNKNOWN CAUSE: Status: ACTIVE | Noted: 2020-07-12

## 2020-07-12 NOTE — ASSESSMENT & PLAN NOTE
Chronic dizziness is very broad differential.  Patient has been seen by ENT which were unable to help him.  Will refer to cardiology for further evaluation and treatment.  Tilt table test was ordered.

## 2020-07-22 DIAGNOSIS — I10 ESSENTIAL HYPERTENSION: ICD-10-CM

## 2020-07-22 RX ORDER — LISINOPRIL 5 MG/1
5 TABLET ORAL DAILY
Qty: 30 TABLET | Refills: 2 | Status: SHIPPED | OUTPATIENT
Start: 2020-07-22 | End: 2020-10-05

## 2020-07-22 RX ORDER — LISINOPRIL 5 MG/1
TABLET ORAL
Qty: 30 TABLET | Refills: 1 | OUTPATIENT
Start: 2020-07-22

## 2020-07-24 DIAGNOSIS — I10 ESSENTIAL HYPERTENSION: ICD-10-CM

## 2020-07-24 RX ORDER — LISINOPRIL 5 MG/1
TABLET ORAL
Qty: 30 TABLET | Refills: 1 | OUTPATIENT
Start: 2020-07-24

## 2020-08-14 ENCOUNTER — APPOINTMENT (OUTPATIENT)
Dept: CARDIOLOGY | Facility: HOSPITAL | Age: 30
End: 2020-08-14

## 2020-08-24 RX ORDER — PANTOPRAZOLE SODIUM 40 MG/1
TABLET, DELAYED RELEASE ORAL
Qty: 90 TABLET | Refills: 0 | Status: SHIPPED | OUTPATIENT
Start: 2020-08-24 | End: 2020-10-25 | Stop reason: SDUPTHER

## 2020-09-03 RX ORDER — PANTOPRAZOLE SODIUM 40 MG/1
TABLET, DELAYED RELEASE ORAL
Qty: 90 TABLET | Refills: 0 | OUTPATIENT
Start: 2020-09-03

## 2020-10-01 DIAGNOSIS — I10 ESSENTIAL HYPERTENSION: ICD-10-CM

## 2020-10-05 RX ORDER — LISINOPRIL 5 MG/1
TABLET ORAL
Qty: 90 TABLET | Refills: 1 | Status: SHIPPED | OUTPATIENT
Start: 2020-10-05 | End: 2021-04-24 | Stop reason: SDUPTHER

## 2020-10-26 RX ORDER — PANTOPRAZOLE SODIUM 40 MG/1
40 TABLET, DELAYED RELEASE ORAL DAILY
Qty: 90 TABLET | Refills: 0 | Status: SHIPPED | OUTPATIENT
Start: 2020-10-26 | End: 2021-02-10 | Stop reason: SDUPTHER

## 2020-11-20 RX ORDER — PANTOPRAZOLE SODIUM 40 MG/1
TABLET, DELAYED RELEASE ORAL
Qty: 90 TABLET | Refills: 0 | OUTPATIENT
Start: 2020-11-20

## 2020-11-20 NOTE — TELEPHONE ENCOUNTER
Patient was given a 3-month supply at the end of October.  He should have plenty medications left at this time.

## 2021-01-26 DIAGNOSIS — I10 ESSENTIAL HYPERTENSION: ICD-10-CM

## 2021-02-02 RX ORDER — PANTOPRAZOLE SODIUM 40 MG/1
40 TABLET, DELAYED RELEASE ORAL DAILY
Qty: 90 TABLET | Refills: 0 | OUTPATIENT
Start: 2021-02-02

## 2021-02-02 RX ORDER — LISINOPRIL 5 MG/1
5 TABLET ORAL DAILY
Qty: 90 TABLET | Refills: 1 | OUTPATIENT
Start: 2021-02-02

## 2021-02-10 RX ORDER — PANTOPRAZOLE SODIUM 40 MG/1
TABLET, DELAYED RELEASE ORAL
Qty: 90 TABLET | Refills: 0 | OUTPATIENT
Start: 2021-02-10

## 2021-02-12 RX ORDER — PANTOPRAZOLE SODIUM 40 MG/1
40 TABLET, DELAYED RELEASE ORAL DAILY
Qty: 90 TABLET | Refills: 0 | Status: SHIPPED | OUTPATIENT
Start: 2021-02-12 | End: 2021-05-02 | Stop reason: SDUPTHER

## 2021-03-16 RX ORDER — VARENICLINE TARTRATE 1 MG/1
TABLET, FILM COATED ORAL
Qty: 56 TABLET | Refills: 0 | Status: SHIPPED | OUTPATIENT
Start: 2021-03-16 | End: 2021-11-17

## 2021-03-24 ENCOUNTER — TELEMEDICINE (OUTPATIENT)
Dept: FAMILY MEDICINE CLINIC | Facility: TELEHEALTH | Age: 31
End: 2021-03-24

## 2021-03-24 VITALS — BODY MASS INDEX: 35 KG/M2 | HEIGHT: 71 IN | WEIGHT: 250 LBS

## 2021-03-24 DIAGNOSIS — K02.9 DENTAL CARIES: ICD-10-CM

## 2021-03-24 DIAGNOSIS — J03.90 TONSILLITIS: Primary | ICD-10-CM

## 2021-03-24 PROCEDURE — 99213 OFFICE O/P EST LOW 20 MIN: CPT | Performed by: NURSE PRACTITIONER

## 2021-03-24 RX ORDER — AMOXICILLIN 875 MG/1
875 TABLET, COATED ORAL 2 TIMES DAILY
Qty: 14 TABLET | Refills: 0 | Status: SHIPPED | OUTPATIENT
Start: 2021-03-24 | End: 2021-03-31

## 2021-03-24 NOTE — PATIENT INSTRUCTIONS
Tonsillitis    Tonsillitis is an infection of the throat that causes the tonsils to become red, tender, and swollen. Tonsils are tissues in the back of your throat. Each tonsil has crevices (crypts). Tonsils normally work to protect the body from infection.  What are the causes?  Sudden (acute) tonsillitis may be caused by a virus or bacteria, including streptococcal bacteria. Long-lasting (chronic) tonsillitis occurs when the crypts of the tonsils become filled with pieces of food and bacteria, which makes it easy for the tonsils to become repeatedly infected.  Tonsillitis can be spread from person to person (is contagious). It may be spread by inhaling droplets that are released with coughing or sneezing. You may also come into contact with viruses or bacteria on surfaces, such as cups or utensils.  What are the signs or symptoms?  Symptoms of this condition include:  · A sore throat. This may include trouble swallowing.  · White patches on the tonsils.  · Swollen tonsils.  · Fever.  · Headache.  · Tiredness.  · Loss of appetite.  · Snoring during sleep when you did not snore before.  · Small, foul-smelling, yellowish-white pieces of material (tonsilloliths) that you occasionally cough up or spit out. These can cause you to have bad breath.  How is this diagnosed?  This condition is diagnosed with a physical exam. Diagnosis can be confirmed with the results of lab tests, including a throat culture.  How is this treated?  Treatment for this condition depends on the cause, but usually focuses on treating the symptoms associated with it. Treatment may include:  · Medicines to relieve pain and manage fever.  · Steroid medicines to reduce swelling.  · Antibiotic medicines if the condition is caused by bacteria.  If attacks of tonsillitis are severe and frequent, your health care provider may recommend surgery to remove the tonsils (tonsillectomy).  Follow these instructions at home:  Medicines  · Take over-the-counter  and prescription medicines only as told by your health care provider.  · If you were prescribed an antibiotic medicine, take it as told by your health care provider. Do not stop taking the antibiotic even if you start to feel better.  Eating and drinking  · Drink enough fluid to keep your urine clear or pale yellow.  · While your throat is sore, eat soft or liquid foods, such as sherbet, soups, or instant breakfast drinks.  · Drink warm liquids.  · Eat frozen ice pops.  General instructions  · Rest as much as possible and get plenty of sleep.  · Gargle with a salt-water mixture 3-4 times a day or as needed. To make a salt-water mixture, completely dissolve ½-1 tsp of salt in 1 cup of warm water.  · Wash your hands regularly with soap and water. If soap and water are not available, use hand .  · Do not share cups, bottles, or other utensils until your symptoms have gone away.  · Do not smoke. This can help your symptoms and prevent the infection from coming back. If you need help quitting, ask your health care provider.  · Keep all follow-up visits as told by your health care provider. This is important.  Contact a health care provider if:  · You notice large, tender lumps in your neck that were not there before.  · You have a fever that does not go away after 2-3 days.  · You develop a rash.  · You cough up a green, yellow-brown, or bloody substance.  · You cannot swallow liquids or food for 24 hours.  · Only one of your tonsils is swollen.  Get help right away if:  · You develop any new symptoms, such as vomiting, severe headache, stiff neck, chest pain, trouble breathing, or trouble swallowing.  · You have severe throat pain along with drooling or voice changes.  · You have severe pain that is not controlled with medicines.  · You cannot fully open your mouth.  · You develop redness, swelling, or severe pain anywhere in your neck.  Summary  · Tonsillitis is an infection of the throat that causes the  tonsils to become red, tender, and swollen.  · Tonsillitis may be caused by a virus or bacteria.  · Rest as much as possible. Get plenty of sleep.  This information is not intended to replace advice given to you by your health care provider. Make sure you discuss any questions you have with your health care provider.  Document Revised: 11/30/2018 Document Reviewed: 01/23/2018  [employer] Patient Education © 2021 [employer]    Dental Caries, Adult    Dental caries or cavities are areas of decay in the outer layers (enamel and dentin) of your tooth. When you eat or drink sugary foods and liquids, the natural bacteria in your mouth break down those sugars and produce a lot of acids. The acids destroy the protective layer of your tooth, leading to tooth decay.  It is important to treat your tooth decay as soon as possible. Untreated dental caries can spread decay and may lead to a painful infection. Keeping your mouth clean (good oral hygiene) by brushing regularly with fluoride toothpaste, flossing, and getting regular dental checkups can help reduce the bacteria and prevent dental caries.  What are the causes?  Dental caries are caused by the acid that is produced when bacteria in your mouth break down sugary foods and liquids.  What increases the risk?  This condition is more likely to develop in people who:  · Drink a lot of sugary liquids, including alcoholic drinks, such as champagne.  · Eat a lot of sweets and carbohydrates.  · Drink water that is not treated with fluoride.  · Have poor oral hygiene.  · Have deep grooves in their teeth.  · Take certain medicines that decrease saliva.  What are the signs or symptoms?  Symptoms of dental caries include:  · White, brown, or black spots on the teeth.  · Pain as the decay progresses.  · Swelling or bleeding in the gums.  How is this diagnosed?  This condition may be diagnosed based on:  · Your signs and symptoms.  · Oral exams. This includes probing the hardness of  the tooth with an instrument called a dental explorer.  · Dental X-rays to look for dental caries between teeth. This is also used to confirm the diagnosis.  Sometimes special lights, dyes, or probes, which use electrical conductivity or laser reflection, can assist in finding dental caries.  How is this treated?  Treatment for dental caries usually involves a procedure to remove the decay and restore the tooth. Restoring the tooth using a filling can be done in the dentist's office. More complex restorations can be created in a lab.  Follow these instructions at home:    · Practice good oral hygiene. This keeps your mouth and gums healthy.  · Use a fluoride-containing toothpaste to brush your teeth twice a day. Floss once a day.  · If your dental caries have caused an infection, you may be given an antibiotic medicine. Take it as told by your dentist. Do not stop taking the antibiotic even if you start to feel better.  · Keep all follow-up visits as told by your dentist. This is important.  ? Follow-up visits include regular cleanings. You will be told how often this needs to be done.  How is this prevented?  To prevent dental caries:  · Brush your teeth every morning and night with fluoride toothpaste.  · Floss your teeth once a day.  · Get regular dental cleanings.  · If told by your dentist, wash your mouth with prescription mouthwash (chlorhexidine) and apply topical fluoride to your teeth.  · Drink water that has fluoride added to it.  · Drink water instead of sugary drinks.  · Eat healthy meals and snacks.  · If prescribed by your dentist, have additional in-office fluoride treatments and sealants placed on your teeth.  Contact a health care provider if:  · You have symptoms of tooth decay.  Summary  · Dental caries or cavities are areas of decay in the outer layers of your tooth. It is important to treat your tooth decay as soon as possible.  · This condition is caused by the acid that is produced when  bacteria in your mouth break down sugary foods and liquids.  · To prevent this condition, practice good oral hygiene. This keeps your mouth and gums healthy by brushing and flossing. Use fluoride toothpaste.  · Take an antibiotic to treat an infection, if told by your dentist. Do not stop taking the antibiotic even if your condition gets better.  · Have regular dental cleanings and keep all follow-up visits.  This information is not intended to replace advice given to you by your health care provider. Make sure you discuss any questions you have with your health care provider.  Document Revised: 12/04/2020 Document Reviewed: 12/04/2020  [employer] Patient Education © 2021 [employer]

## 2021-03-24 NOTE — PROGRESS NOTES
"CHIEF COMPLAINT  Cc: sore throat    HPI  Rahul Walters is a 30 y.o. male  presents with complaint of a sore throat that started this morning. He is also reporting white spots on the back of his throat. He reports a history of strep throat and that this is how it always starts for him and them it gets worse, No fever reported at this time and he. Has not yet taken anything for his symptoms    Review of Systems   Constitutional: Negative for fatigue and fever.   HENT: Positive for sore throat (red with white spots). Negative for congestion, ear pain and trouble swallowing.    Respiratory: Negative for cough, shortness of breath and wheezing.    Cardiovascular: Negative for chest pain.   Gastrointestinal: Negative for diarrhea, nausea and vomiting.   Musculoskeletal: Negative for myalgias.   Skin: Negative for rash.   Neurological: Negative for headaches.       Past Medical History:   Diagnosis Date   • Dental caries    • GERD (gastroesophageal reflux disease)    • Gingivitis    • HTN (hypertension)    • Obesity    • Periodontal disease    • Tobacco dependence        Family History   Problem Relation Age of Onset   • Heart attack Mother    • Coronary artery disease Mother    • Stroke Father    • Hypertension Father        Social History     Socioeconomic History   • Marital status:      Spouse name: Suni   • Number of children: 2   • Years of education: H.S.   • Highest education level: High school graduate   Tobacco Use   • Smoking status: Former Smoker     Packs/day: 1.50     Years: 12.00     Pack years: 18.00     Types: Cigarettes   • Smokeless tobacco: Never Used   Substance and Sexual Activity   • Alcohol use: No   • Drug use: No   • Sexual activity: Yes     Partners: Female     Birth control/protection: Tubal ligation         Ht 180.3 cm (71\")   Wt 113 kg (250 lb)   BMI 34.87 kg/m²     PHYSICAL EXAM  Physical Exam   Constitutional: He is oriented to person, place, and time. He appears " well-developed and well-nourished.   HENT:   Head: Normocephalic and atraumatic.   Right Ear: Hearing and external ear normal.   Left Ear: Hearing and external ear normal.   Nose: Nose normal.   Mouth/Throat: Mouth/Lips are normal.Mucous membranes are erythematous. Dental caries present. Oropharyngeal exudate present.   Eyes: Lids are normal. Right eye exhibits no discharge and no exudate. Left eye exhibits no discharge and no exudate. Right conjunctiva is not injected. Left conjunctiva is not injected.   Pulmonary/Chest: No accessory muscle usage. No tachypnea and no bradypnea.  No respiratory distress.No use of oxygen by nasal cannulaNo use of oxygen by mask noted.  Abdominal: Abdomen appears normal.   Lymphadenopathy:        Right cervical: Anterior cervical (mild patient directed exam) adenopathy present.        Left cervical: Anterior cervical (mild patient directed exam) adenopathy present.   Neurological: He is alert and oriented to person, place, and time. No cranial nerve deficit.   Skin: His skin appears normal.  Psychiatric: He has a normal mood and affect. His speech is normal and behavior is normal. Judgment and thought content normal.       Results for orders placed or performed in visit on 02/06/19   Comprehensive Metabolic Panel    Specimen: Arm, Left; Blood   Result Value Ref Range    Glucose 74 70 - 100 mg/dL    BUN 19 9 - 23 mg/dL    Creatinine 0.94 0.60 - 1.30 mg/dL    Sodium 138 132 - 146 mmol/L    Potassium 4.5 3.5 - 5.5 mmol/L    Chloride 106 99 - 109 mmol/L    CO2 28.0 20.0 - 31.0 mmol/L    Calcium 9.6 8.7 - 10.4 mg/dL    Total Protein 6.8 5.7 - 8.2 g/dL    Albumin 4.70 3.20 - 4.80 g/dL    ALT (SGPT) 35 7 - 40 U/L    AST (SGOT) 27 0 - 33 U/L    Alkaline Phosphatase 67 25 - 100 U/L    Total Bilirubin 0.3 0.3 - 1.2 mg/dL    eGFR Non African Amer 96 >60 mL/min/1.73    Globulin 2.1 gm/dL    A/G Ratio 2.2 1.5 - 2.5 g/dL    BUN/Creatinine Ratio 20.2 7.0 - 25.0    Anion Gap 4.0 3.0 - 11.0 mmol/L    Lipid Panel    Specimen: Arm, Left; Blood   Result Value Ref Range    Total Cholesterol 149 0 - 200 mg/dL    Triglycerides 255 (H) 0 - 150 mg/dL    HDL Cholesterol 25 (L) 40 - 60 mg/dL    LDL Cholesterol  109 0 - 130 mg/dL   TSH    Specimen: Arm, Left; Blood   Result Value Ref Range    TSH 1.635 0.350 - 5.350 mIU/mL   Uric Acid    Specimen: Arm, Left; Blood   Result Value Ref Range    Uric Acid 7.2 3.7 - 9.2 mg/dL   C-reactive Protein    Specimen: Arm, Left; Blood   Result Value Ref Range    C-Reactive Protein 1.16 (H) 0.00 - 1.00 mg/dL   HIV-1 / O / 2 Ag / Antibody 4th Generation    Specimen: Arm, Left; Blood   Result Value Ref Range    HIV-1/ HIV-2 Non-Reactive Non-Reactive   Hemoglobin A1c    Specimen: Arm, Left; Blood   Result Value Ref Range    Hemoglobin A1C 5.50 4.80 - 5.60 %   CBC Auto Differential    Specimen: Arm, Left; Blood   Result Value Ref Range    WBC 9.26 3.50 - 10.80 10*3/mm3    RBC 5.31 4.20 - 5.76 10*6/mm3    Hemoglobin 16.1 13.1 - 17.5 g/dL    Hematocrit 47.5 38.9 - 50.9 %    MCV 89.5 80.0 - 99.0 fL    MCH 30.3 27.0 - 31.0 pg    MCHC 33.9 32.0 - 36.0 g/dL    RDW 13.1 11.3 - 14.5 %    RDW-SD 42.7 37.0 - 54.0 fl    MPV 12.5 (H) 6.0 - 12.0 fL    Platelets 220 150 - 450 10*3/mm3    Neutrophil % 58.7 41.0 - 71.0 %    Lymphocyte % 29.6 24.0 - 44.0 %    Monocyte % 9.1 0.0 - 12.0 %    Eosinophil % 2.2 0.0 - 3.0 %    Basophil % 0.4 0.0 - 1.0 %    Immature Grans % 0.2 0.0 - 0.6 %    Neutrophils, Absolute 5.44 1.50 - 8.30 10*3/mm3    Lymphocytes, Absolute 2.74 0.60 - 4.80 10*3/mm3    Monocytes, Absolute 0.84 0.00 - 1.00 10*3/mm3    Eosinophils, Absolute 0.20 0.00 - 0.30 10*3/mm3    Basophils, Absolute 0.04 0.00 - 0.20 10*3/mm3    Immature Grans, Absolute 0.02 0.00 - 0.03 10*3/mm3   POC Urinalysis Dipstick, Automated    Specimen: Urine   Result Value Ref Range    Color Yellow Yellow, Straw, Dark Yellow, Sarah    Clarity, UA Clear Clear    Specific Gravity  1.030 1.005 - 1.030    pH, Urine 6.0 5.0 - 8.0     Leukocytes Negative Negative    Nitrite, UA Negative Negative    Protein, POC Negative Negative mg/dL    Glucose, UA Negative Negative, 1000 mg/dL (3+) mg/dL    Ketones, UA Negative Negative    Urobilinogen, UA Normal Normal    Bilirubin Negative Negative    Blood, UA Negative Negative       Diagnoses and all orders for this visit:    1. Tonsillitis (Primary)    2. Dental caries    Other orders  -     amoxicillin (AMOXIL) 875 MG tablet; Take 1 tablet by mouth 2 (Two) Times a Day for 7 days.  Dispense: 14 tablet; Refill: 0    Probiotics for one month related to taking antibiotics. The pharmacist can help you with this if needed. Do not take within two hours of antibiotic.  May take alternate tylenol and ibuprofen for pain or fever      FOLLOW-UP  If symptoms worsen or persist follow up with PCP/Virtual Care or Urgent Care  F/u with dentist also recommended    COVID-19  if at any time you experience fever AND/OR cough AND/OR shortness of breath AND/OR loss of sense of taste or smell you are being advised to go to nearest urgent care or emergency department for evaluation AND/OR testing    Patient verbalizes understanding of medication dosage, comfort measures, instructions for treatment and follow-up.    Brittanie Le, APRN  03/24/2021  08:17 EDT    This visit was performed via Telehealth.  This patient has been instructed to follow-up with their primary care provider if their symptoms worsen or the treatment provided does not resolve their illness.

## 2021-04-24 DIAGNOSIS — I10 ESSENTIAL HYPERTENSION: ICD-10-CM

## 2021-04-26 RX ORDER — LISINOPRIL 5 MG/1
5 TABLET ORAL DAILY
Qty: 90 TABLET | Refills: 1 | Status: SHIPPED | OUTPATIENT
Start: 2021-04-26 | End: 2021-10-25

## 2021-05-03 RX ORDER — PANTOPRAZOLE SODIUM 40 MG/1
40 TABLET, DELAYED RELEASE ORAL DAILY
Qty: 90 TABLET | Refills: 0 | Status: SHIPPED | OUTPATIENT
Start: 2021-05-03 | End: 2021-07-19 | Stop reason: SDUPTHER

## 2021-06-22 DIAGNOSIS — R42 DIZZINESS OF UNKNOWN CAUSE: Primary | ICD-10-CM

## 2021-06-22 DIAGNOSIS — I10 ESSENTIAL HYPERTENSION: ICD-10-CM

## 2021-07-19 RX ORDER — PANTOPRAZOLE SODIUM 40 MG/1
40 TABLET, DELAYED RELEASE ORAL DAILY
Qty: 90 TABLET | Refills: 0 | Status: SHIPPED | OUTPATIENT
Start: 2021-07-19 | End: 2021-10-25

## 2021-09-30 ENCOUNTER — TELEMEDICINE (OUTPATIENT)
Dept: FAMILY MEDICINE CLINIC | Facility: TELEHEALTH | Age: 31
End: 2021-09-30

## 2021-09-30 DIAGNOSIS — J30.9 ALLERGIC RHINITIS, UNSPECIFIED SEASONALITY, UNSPECIFIED TRIGGER: ICD-10-CM

## 2021-09-30 DIAGNOSIS — J02.9 ACUTE PHARYNGITIS, UNSPECIFIED ETIOLOGY: Primary | ICD-10-CM

## 2021-09-30 PROCEDURE — 99213 OFFICE O/P EST LOW 20 MIN: CPT | Performed by: NURSE PRACTITIONER

## 2021-09-30 RX ORDER — FLUTICASONE PROPIONATE 50 MCG
2 SPRAY, SUSPENSION (ML) NASAL DAILY
Qty: 18.2 ML | Refills: 0 | OUTPATIENT
Start: 2021-09-30 | End: 2022-04-30

## 2021-09-30 RX ORDER — AMOXICILLIN 500 MG/1
500 CAPSULE ORAL 2 TIMES DAILY
Qty: 20 CAPSULE | Refills: 0 | Status: SHIPPED | OUTPATIENT
Start: 2021-09-30 | End: 2021-10-10

## 2021-09-30 RX ORDER — LORATADINE 10 MG/1
10 TABLET ORAL DAILY
Qty: 30 TABLET | Refills: 0 | OUTPATIENT
Start: 2021-09-30 | End: 2022-04-30

## 2021-09-30 NOTE — PROGRESS NOTES
CHIEF COMPLAINT  Chief Complaint   Patient presents with   • Sinusitis         HPI  Rahul Walters is a 31 y.o. male  presents with complaint of sore throat, nasal congestion, PND, pressure/pain in right maxillary sinus region, clearing throat often.  Denies fever, cough.  Gets strep throat often with similar symptoms.    Does not want COVID-19 test at this time      Review of Systems   Constitutional: Negative for fever.   HENT: Positive for congestion, sinus pressure and sore throat.    Respiratory: Negative for cough.    Neurological: Negative for headaches.   All other systems reviewed and are negative.      Past Medical History:   Diagnosis Date   • Dental caries    • GERD (gastroesophageal reflux disease)    • Gingivitis    • HTN (hypertension)    • Obesity    • Periodontal disease    • Tobacco dependence        Family History   Problem Relation Age of Onset   • Heart attack Mother    • Coronary artery disease Mother    • Stroke Father    • Hypertension Father        Social History     Socioeconomic History   • Marital status:      Spouse name: Suni   • Number of children: 2   • Years of education: H.S.   • Highest education level: High school graduate   Tobacco Use   • Smoking status: Former Smoker     Packs/day: 1.50     Years: 12.00     Pack years: 18.00     Types: Cigarettes   • Smokeless tobacco: Never Used   Substance and Sexual Activity   • Alcohol use: No   • Drug use: No   • Sexual activity: Yes     Partners: Female     Birth control/protection: Tubal ligation         There were no vitals taken for this visit.    PHYSICAL EXAM  Physical Exam   Constitutional: He is oriented to person, place, and time. He appears well-developed and well-nourished. He does not have a sickly appearance. He does not appear ill.   HENT:   Head: Normocephalic and atraumatic.   Pulmonary/Chest: Effort normal.  No respiratory distress.  Neurological: He is alert and oriented to person, place, and time.          Diagnoses and all orders for this visit:    1. Acute pharyngitis, unspecified etiology (Primary)  -     amoxicillin (AMOXIL) 500 MG capsule; Take 1 capsule by mouth 2 (Two) Times a Day for 10 days.  Dispense: 20 capsule; Refill: 0    2. Allergic rhinitis, unspecified seasonality, unspecified trigger  -     loratadine (Claritin) 10 MG tablet; Take 1 tablet by mouth Daily.  Dispense: 30 tablet; Refill: 0  -     fluticasone (FLONASE) 50 MCG/ACT nasal spray; 2 sprays into the nostril(s) as directed by provider Daily.  Dispense: 18.2 mL; Refill: 0    --will treat for strep and allergies  --Amoxicillin as prescribed; claritin and Flonase daily  --increase fluids, tylenol or ibuprofen for pain  --if no improvement in 3-4 days, recommend testing for COVID-19      FOLLOW-UP  As discussed during visit with PCP/[facility] if no improvement or Urgent Care/Emergency Department if worsening of symptoms    Patient verbalizes understanding of medication dosage, comfort measures, instructions for treatment and follow-up.    KRIS Hernandez  09/30/2021  10:58 EDT    This visit was performed via Telehealth.  This patient has been instructed to follow-up with their primary care provider if their symptoms worsen or the treatment provided does not resolve their illness.

## 2021-09-30 NOTE — PATIENT INSTRUCTIONS
Strep Throat, Adult  Strep throat is an infection in the throat that is caused by bacteria. It is common during the cold months of the year. It mostly affects children who are 5-15 years old. However, people of all ages can get it at any time of the year. This infection spreads from person to person (is contagious) through coughing, sneezing, or having close contact.  Your health care provider may use other names to describe the infection. It can be called tonsillitis (if there is swelling of the tonsils), or pharyngitis (if there is swelling at the back of the throat).  What are the causes?  This condition is caused by the Streptococcus pyogenes bacteria.  What increases the risk?  You are more likely to develop this condition if:  · You care for school-age children, or are around school-age children. Children are more likely to get strep throat and may spread it to others.  · You spend time in crowded places where the infection can spread easily.  · You have close contact with someone who has strep throat.  What are the signs or symptoms?  Symptoms of this condition include:  · Fever or chills.  · Redness, swelling, or pain in the tonsils or throat.  · Pain or difficulty when swallowing.  · White or yellow spots on the tonsils or throat.  · Tender glands in the neck and under the jaw.  · Bad smelling breath.  · Red rash all over the body. This is rare.  How is this diagnosed?  This condition is diagnosed by tests that check for the presence and the amount of bacteria that cause strep throat. They are:  · Rapid strep test. Your throat is swabbed and checked for the presence of bacteria. Results are usually ready in minutes.  · Throat culture test. Your throat is swabbed. The sample is placed in a cup that allows infections to grow. Results are usually ready in 1 or 2 days.  How is this treated?  This condition may be treated with:  · Medicines that kill germs (antibiotics).  · Medicines that relieve pain or fever.  These include:  ? Ibuprofen or acetaminophen.  ? Aspirin, only for patients who are over the age of 18.  ? Throat lozenges.  ? Throat sprays.  Follow these instructions at home:  Medicines    · Take over-the-counter and prescription medicines only as told by your health care provider.  · Take your antibiotic medicine as told by your health care provider. Do not stop taking the antibiotic even if you start to feel better.    Eating and drinking    · If you have trouble swallowing, try eating soft foods until your sore throat feels better.  · Drink enough fluid to keep your urine pale yellow.  · To help relieve pain, you may have:  ? Warm fluids, such as soup and tea.  ? Cold fluids, such as frozen desserts or popsicles.    General instructions  · Gargle with a salt-water mixture 3-4 times a day or as needed. To make a salt-water mixture, completely dissolve ½-1 tsp (3-6 g) of salt in 1 cup (237 mL) of warm water.  · Get plenty of rest.  · Stay home from work or school until you have been taking antibiotics for 24 hours.  · Avoid smoking or being around people who smoke.  · Keep all follow-up visits as told by your health care provider. This is important.  How is this prevented?    · Do not share food, drinking cups, or personal items that could cause the infection to spread to other people.  · Wash your hands well with soap and water, and make sure that all people in your house wash their hands well.  · Have family members tested if they have a sore throat or fever. They may need an antibiotic if they have strep throat.  Contact a health care provider if:  · The glands in your neck continue to get bigger.  · You develop a rash, cough, or earache.  · You cough up a thick mucus that is green, yellow-brown, or bloody.  · You have pain or discomfort that does not get better with medicine.  · Your symptoms seem to be getting worse and not better.  · You have a fever.  Get help right away if:  · You have new symptoms, such  as vomiting, severe headache, stiff or painful neck, chest pain, or shortness of breath.  · You have severe throat pain, drooling, or changes in your voice.  · You have swelling of the neck, or the skin on the neck becomes red and tender.  · You have signs of dehydration, such as tiredness (fatigue), dry mouth, and decreased urination.  · You become increasingly sleepy, or you cannot wake up completely.  · Your joints become red or painful.  Summary  · Strep throat is an infection in the throat that is caused by the Streptococcus pyogenes bacteria. This infection is spread from person to person (is contagious) through coughing, sneezing, or having close contact.  · Take your medicines, including antibiotics, as told by your health care provider. Do not stop taking the antibiotic even if you start to feel better.  · To prevent the spread of germs, wash your hands well with soap and water. Have others do the same. Do not share food, drinking cups, or personal items.  · Get help right away if you have new symptoms, such as vomiting, severe headache, stiff or painful neck, chest pain, or shortness of breath.  This information is not intended to replace advice given to you by your health care provider. Make sure you discuss any questions you have with your health care provider.  Document Revised: 03/06/2020 Document Reviewed: 03/06/2020  [employer] Patient Education © 2021 [employer]      Allergic Rhinitis, Adult    Allergic rhinitis is an allergic reaction that affects the mucous membrane inside the nose. The mucous membrane is the tissue that produces mucus.  There are two types of allergic rhinitis:  · Seasonal. This type is also called hay fever and happens only during certain seasons.  · Perennial. This type can happen at any time of the year.  Allergic rhinitis cannot be spread from person to person. This condition can be mild, moderate, or severe. It can develop at any age and may be outgrown.  What are the  causes?  This condition is caused by allergens. These are things that can cause an allergic reaction. Allergens may differ for seasonal allergic rhinitis and perennial allergic rhinitis.  · Seasonal allergic rhinitis is triggered by pollen. Pollen can come from grasses, trees, and weeds.  · Perennial allergic rhinitis may be triggered by:  ? Dust mites.  ? Proteins in a pet's urine, saliva, or dander. Dander is dead skin cells from a pet.  ? Smoke, mold, or car fumes.  What increases the risk?  You are more likely to develop this condition if you have a family history of allergies or other conditions related to allergies, including:  · Allergic conjunctivitis. This is inflammation of parts of the eyes and eyelids.  · Asthma. This condition affects the lungs and makes it hard to breathe.  · Atopic dermatitis or eczema. This is long term (chronic) inflammation of the skin.  · Food allergies.  What are the signs or symptoms?  Symptoms of this condition include:  · Sneezing or coughing.  · A stuffy nose (nasal congestion), itchy nose, or nasal discharge.  · Itchy eyes and tearing of the eyes.  · A feeling of mucus dripping down the back of your throat (postnasal drip).  · Trouble sleeping.  · Tiredness or fatigue.  · Headache.  · Sore throat.  How is this diagnosed?  This condition may be diagnosed with your symptoms, medical history, and physical exam. Your health care provider may check for related conditions, such as:  · Asthma.  · Pink eye. This is eye inflammation caused by infection (conjunctivitis).  · Ear infection.  · Upper respiratory infection. This is an infection in the nose, throat, or upper airways.  You may also have tests to find out which allergens trigger your symptoms. These may include skin tests or blood tests.  How is this treated?  There is no cure for this condition, but treatment can help control symptoms. Treatment may include:  · Taking medicines that block allergy symptoms, such as  corticosteroids and antihistamines. Medicine may be given as a shot, nasal spray, or pill.  · Avoiding any allergens.  · Being exposed again and again to tiny amounts of allergens to help you build a defense against allergens (immunotherapy). This is done if other treatments have not helped. It may include:  ? Allergy shots. These are injected medicines that have small amounts of allergen in them.  ? Sublingual immunotherapy. This involves taking small doses of a medicine with allergen in it under your tongue.  If these treatments do not work, your health care provider may prescribe newer, stronger medicines.  Follow these instructions at home:  Avoiding allergens  Find out what you are allergic to and avoid those allergens. These are some things you can do to help avoid allergens:  · If you have perennial allergies:  ? Replace carpet with wood, tile, or vinyl disha. Carpet can trap dander and dust.  ? Do not smoke. Do not allow smoking in your home.  ? Change your heating and air conditioning filters at least once a month.  · If you have seasonal allergies, take these steps during allergy season:  ? Keep windows closed as much as possible.  ? Plan outdoor activities when pollen counts are lowest. Check pollen counts before you plan outdoor activities.  ? When coming indoors, change clothing and shower before sitting on furniture or bedding.  · If you have a pet in the house that produces allergens:  ? Keep the pet out of the bedroom.  ? Vacuum, sweep, and dust regularly.  General instructions  · Take over-the-counter and prescription medicines only as told by your health care provider.  · Drink enough fluid to keep your urine pale yellow.  · Keep all follow-up visits as told by your health care provider. This is important.  Where to find more information  · American Academy of Allergy, Asthma & Immunology: www.aaaai.org  Contact a health care provider if:  · You have a fever.  · You develop a cough that does not  go away.  · You make whistling sounds when you breathe (wheeze).  · Your symptoms slow you down or stop you from doing your normal activities each day.  Get help right away if:  · You have shortness of breath.  This symptom may represent a serious problem that is an emergency. Do not wait to see if the symptom will go away. Get medical help right away. Call your local emergency services (911 in the [place] Do not drive yourself to the hospital.  Summary  · Allergic rhinitis may be managed by taking medicines as directed and avoiding allergens.  · If you have seasonal allergies, keep windows closed as much as possible during allergy season.  · Contact your health care provider if you develop a fever or a cough that does not go away.  This information is not intended to replace advice given to you by your health care provider. Make sure you discuss any questions you have with your health care provider.  Document Revised: 02/05/2021 Document Reviewed: 12/15/2020  [employer] Patient Education © 2021 [employer]

## 2021-10-13 DIAGNOSIS — I10 ESSENTIAL HYPERTENSION: ICD-10-CM

## 2021-10-15 RX ORDER — PANTOPRAZOLE SODIUM 40 MG/1
40 TABLET, DELAYED RELEASE ORAL DAILY
Qty: 90 TABLET | Refills: 0 | OUTPATIENT
Start: 2021-10-15

## 2021-10-15 RX ORDER — LISINOPRIL 5 MG/1
5 TABLET ORAL DAILY
Qty: 90 TABLET | Refills: 1 | OUTPATIENT
Start: 2021-10-15

## 2021-10-15 NOTE — TELEPHONE ENCOUNTER
Patient has not been seen in over a year and will need to schedule an appointment for an annual physical exam in order to receive further refills.  Once appointment has been scheduled I will send in enough medication to make it to the appointment time.

## 2021-10-16 DIAGNOSIS — I10 ESSENTIAL HYPERTENSION: ICD-10-CM

## 2021-10-25 RX ORDER — LISINOPRIL 5 MG/1
TABLET ORAL
Qty: 90 TABLET | Refills: 0 | Status: SHIPPED | OUTPATIENT
Start: 2021-10-25 | End: 2022-01-11 | Stop reason: SDUPTHER

## 2021-10-25 RX ORDER — PANTOPRAZOLE SODIUM 40 MG/1
TABLET, DELAYED RELEASE ORAL
Qty: 90 TABLET | Refills: 0 | Status: SHIPPED | OUTPATIENT
Start: 2021-10-25 | End: 2022-01-11 | Stop reason: SDUPTHER

## 2021-11-17 ENCOUNTER — TELEMEDICINE (OUTPATIENT)
Dept: FAMILY MEDICINE CLINIC | Facility: TELEHEALTH | Age: 31
End: 2021-11-17

## 2021-11-17 VITALS — TEMPERATURE: 98.6 F

## 2021-11-17 DIAGNOSIS — K52.9 GASTROENTERITIS: Primary | ICD-10-CM

## 2021-11-17 PROCEDURE — 99213 OFFICE O/P EST LOW 20 MIN: CPT | Performed by: NURSE PRACTITIONER

## 2021-11-17 NOTE — PROGRESS NOTES
Mode of Visit: Video  Location of patient: home  You have chosen to receive care through a telehealth visit.  The patient has signed the video visit consent form.  The visit included audio and video interaction. No technical issues occurred during this visit.     Chief Complaint  Diarrhea and Fever    Subjective          Rahul Walters presents to [facility]  Diarrhea and Fever since 11/15/2021 with fever subsiding on Tuesday. Diarrhea has continued. He says that he was not eating for several days but was able to tolerate solids and fluids last night and today. He has been taking Pepto-Bismol which helped some. He says that his stomach was growling like he was hungry but he had no appetite. He had abdominal pressure around the umbilicus but no cramping or pain. Temperature has been normal. He took an at-home Covid test this morning that was negative. He denies nausea, vomiting.     Objective   Vital Signs:   Temp 98.6 °F (37 °C)     Physical Exam   Constitutional: He appears well-developed and well-nourished. No distress.   Pulmonary/Chest: Effort normal.   Neurological: He is alert.   Psychiatric: He has a normal mood and affect.     Result Review :                 Assessment and Plan    Diagnoses and all orders for this visit:    1. Gastroenteritis (Primary)    Can take Imodium for diarrhea as needed.     I spent 20 minutes caring for Rahul on this date of service. This time includes time spent by me in the following activities:preparing for the visit, obtaining and/or reviewing a separately obtained history, performing a medically appropriate examination and/or evaluation , counseling and educating the patient/family/caregiver and documenting information in the medical record  Follow Up   No follow-ups on file.  Patient was given instructions and counseling regarding his condition or for health maintenance advice. Please see specific information pulled into the AVS if  appropriate.

## 2021-11-17 NOTE — PATIENT INSTRUCTIONS
Can take Imodium for diarrhea as needed. If no improvement in 3-4 days, follow up with Urgent Care or PCP.  Viral Gastroenteritis, Adult    Viral gastroenteritis is also known as the stomach flu. This condition may affect your stomach, small intestine, and large intestine. It can cause sudden watery diarrhea, fever, and vomiting. This condition is caused by many different viruses. These viruses can be passed from person to person very easily (are contagious).  Diarrhea and vomiting can make you feel weak and cause you to become dehydrated. You may not be able to keep fluids down. Dehydration can make you tired and thirsty, cause you to have a dry mouth, and decrease how often you urinate. It is important to replace the fluids that you lose from diarrhea and vomiting.  What are the causes?  Gastroenteritis is caused by many viruses, including rotavirus and norovirus. Norovirus is the most common cause in adults. You can get sick after being exposed to the viruses from other people. You can also get sick by:  · Eating food, drinking water, or touching a surface contaminated with one of these viruses.  · Sharing utensils or other personal items with an infected person.  What increases the risk?  You are more likely to develop this condition if you:  · Have a weak body defense system (immune system).  · Live with one or more children who are younger than 2 years old.  · Live in a nursing home.  · Travel on cruise ships.  What are the signs or symptoms?  Symptoms of this condition start suddenly 1-3 days after exposure to a virus. Symptoms may last for a few days or for as long as a week. Common symptoms include watery diarrhea and vomiting. Other symptoms include:  · Fever.  · Headache.  · Fatigue.  · Pain in the abdomen.  · Chills.  · Weakness.  · Nausea.  · Muscle aches.  · Loss of appetite.  How is this diagnosed?  This condition is diagnosed with a medical history and physical exam. You may also have a stool test to  check for viruses or other infections.  How is this treated?  This condition typically goes away on its own. The focus of treatment is to prevent dehydration and restore lost fluids (rehydration). This condition may be treated with:  · An oral rehydration solution (ORS) to replace important salts and minerals (electrolytes) in your body. Take this if told by your health care provider. This is a drink that is sold at pharmacies and retail stores.  · Medicines to help with your symptoms.  · Probiotic supplements to reduce symptoms of diarrhea.  · Fluids given through an IV, if dehydration is severe.  Older adults and people with other diseases or a weak immune system are at higher risk for dehydration.  Follow these instructions at home:    Eating and drinking    · Take an ORS as told by your health care provider.  · Drink clear fluids in small amounts as you are able. Clear fluids include:  ? Water.  ? Ice chips.  ? Diluted fruit juice.  ? Low-calorie sports drinks.  · Drink enough fluid to keep your urine pale yellow.  · Eat small amounts of healthy foods every 3-4 hours as you are able. This may include whole grains, fruits, vegetables, lean meats, and yogurt.  · Avoid fluids that contain a lot of sugar or caffeine, such as energy drinks, sports drinks, and soda.  · Avoid spicy or fatty foods.  · Avoid alcohol.    General instructions  · Wash your hands often, especially after having diarrhea or vomiting. If soap and water are not available, use hand .  · Make sure that all people in your household wash their hands well and often.  · Take over-the-counter and prescription medicines only as told by your health care provider.  · Rest at home while you recover.  · Watch your condition for any changes.  · Take a warm bath to relieve any burning or pain from frequent diarrhea episodes.  · Keep all follow-up visits as told by your health care provider. This is important.  Contact a health care provider if  you:  · Cannot keep fluids down.  · Have symptoms that get worse.  · Have new symptoms.  · Feel light-headed or dizzy.  · Have muscle cramps.  Get help right away if you:  · Have chest pain.  · Feel extremely weak or you faint.  · See blood in your vomit.  · Have vomit that looks like coffee grounds.  · Have bloody or black stools or stools that look like tar.  · Have a severe headache, a stiff neck, or both.  · Have a rash.  · Have severe pain, cramping, or bloating in your abdomen.  · Have trouble breathing or you are breathing very quickly.  · Have a fast heartbeat.  · Have skin that feels cold and clammy.  · Feel confused.  · Have pain when you urinate.  · Have signs of dehydration, such as:  ? Dark urine, very little urine, or no urine.  ? Cracked lips.  ? Dry mouth.  ? Sunken eyes.  ? Sleepiness.  ? Weakness.  Summary  · Viral gastroenteritis is also known as the stomach flu. It can cause sudden watery diarrhea, fever, and vomiting.  · This condition can be passed from person to person very easily (is contagious).  · Take an ORS if told by your health care provider. This is a drink that is sold at pharmacies and retail stores.  · Wash your hands often, especially after having diarrhea or vomiting. If soap and water are not available, use hand .  This information is not intended to replace advice given to you by your health care provider. Make sure you discuss any questions you have with your health care provider.  Document Revised: 06/05/2020 Document Reviewed: 10/23/2019  Elsevier Patient Education © 2021 [employer]

## 2021-12-06 ENCOUNTER — TELEMEDICINE (OUTPATIENT)
Dept: FAMILY MEDICINE CLINIC | Facility: TELEHEALTH | Age: 31
End: 2021-12-06

## 2021-12-06 DIAGNOSIS — S20.469A INSECT BITE OF BACK WALL OF THORAX, UNSPECIFIED LOCATION, INITIAL ENCOUNTER: Primary | ICD-10-CM
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
## 2021-12-06 PROCEDURE — 99213 OFFICE O/P EST LOW 20 MIN: CPT | Performed by: NURSE PRACTITIONER

## 2021-12-06 RX ORDER — METHYLPREDNISOLONE 4 MG/1
TABLET ORAL
Qty: 21 TABLET | Refills: 0 | Status: SHIPPED | OUTPATIENT
Start: 2021-12-06 | End: 2022-04-23

## 2021-12-06 NOTE — PATIENT INSTRUCTIONS
Insect Bite, Adult  An insect bite can make your skin red, itchy, and swollen. An insect bite is different from an insect sting, which happens when an insect injects poison (venom) into the skin.  Some insects can spread disease to people through a bite. However, most insect bites do not lead to disease and are not serious.  What are the causes?  Insects may bite for a variety of reasons, including:  · Hunger.  · To defend themselves.  Insects that bite include:  · Spiders.  · Mosquitoes.  · Ticks.  · Fleas.  · Ants.  · Flies.  · Kissing bugs.  · Chiggers.  What are the signs or symptoms?  Symptoms of this condition include:  · Itching or pain in the bite area.  · Redness and swelling in the bite area.  · An open wound (skin ulcer).  In many cases, symptoms last for 2-4 days.  In rare cases, a person may have a severe allergic reaction (anaphylactic reaction) to a bite. Symptoms of an anaphylactic reaction may include:  · Feeling warm in the face (flushed). This may include redness.  · Itchy, red, swollen areas of skin (hives).  · Swelling of the eyes, lips, face, mouth, tongue, or throat.  · Difficulty breathing, speaking, or swallowing.  · Noisy breathing (wheezing).  · Dizziness or light-headedness.  · Fainting.  · Pain or cramping in the abdomen.  · Vomiting.  · Diarrhea.  How is this diagnosed?  This condition is usually diagnosed based on symptoms and a physical exam.  How is this treated?  Treatment is usually not needed. Symptoms often go away on their own. When treatment is recommended, it may involve:  · Applying a cream or lotion to the bite area. This treatment helps with itching.  · Taking an antibiotic medicine. This treatment is needed if the bite area gets infected.  · Getting a tetanus shot, if you are not up to date on this vaccine.  · Applying ice to the affected area.  · Allergy medicines called antihistamines. This treatment may be needed if you develop itching or an allergic reaction to the  "insect bite.  · Giving yourself an epinephrine injection if you have an anaphylactic reaction to a bite. To give the injection, you will use what is commonly called an auto-injector \"pen\" (pre-filled automatic epinephrine injection device). Your health care provider will teach you how to use an auto-injector pen.  Follow these instructions at home:  Bite area care    · Do not scratch the bite area.  · Keep the bite area clean and dry. Wash it every day with soap and water as told by your health care provider.  · Check the bite area every day for signs of infection. Check for:  ? Redness, swelling, or pain.  ? Fluid or blood.  ? Warmth.  ? Pus or a bad smell.    Managing pain, itching, and swelling    · You may apply cortisone cream, calamine lotion, or a paste made of baking soda and water to the bite area as told by your health care provider.  · If directed, put ice on the bite area.  ? Put ice in a plastic bag.  ? Place a towel between your skin and the bag.  ? Leave the ice on for 20 minutes, 2-3 times a day.    General instructions  · Apply or take over-the-counter and prescription medicines only as told by your health care provider.  · If you were prescribed an antibiotic medicine, take or apply it as told by your health care provider. Do not stop using the antibiotic even if your condition improves.  · Keep all follow-up visits as told by your health care provider. This is important.  How is this prevented?  To help reduce your risk of insect bites:  · When you are outdoors, wear clothing that covers your arms and legs. This is especially important in the early morning and evening.  · Use insect repellent. The best insect repellents contain DEET, picaridin, oil of lemon eucalyptus (OLE), or [id number].  · Consider spraying your clothing with a pesticide called permethrin. Permethrin helps prevent insect bites. It works for several weeks and for up to 5-6 clothing washes. Do not apply permethrin directly to the " skin.  · If your home windows do not have screens, consider installing them.  · If you will be sleeping in an area where there are mosquitoes, consider covering your sleeping area with a mosquito net.  Contact a health care provider if:  · You have redness, swelling, or pain in the bite area.  · You have fluid or blood coming from the bite area.  · The bite area feels warm to the touch.  · You have pus or a bad smell coming from the bite area.  · You have a fever.  Get help right away if:  · You have joint pain.  · You have a rash.  · You feel unusually tired or sleepy.  · You have neck pain.  · You have a headache.  · You have unusual weakness.  · You develop symptoms of an anaphylactic reaction. These may include:  ? Flushed skin.  ? Hives.  ? Swelling of the eyes, lips, face, mouth, tongue, or throat.  ? Difficulty breathing, speaking, or swallowing.  ? Wheezing.  ? Dizziness or light-headedness.  ? Fainting.  ? Pain or cramping in the abdomen.  ? Vomiting.  ? Diarrhea.  These symptoms may represent a serious problem that is an emergency. Do not wait to see if the symptoms will go away. Do the following right away:  · Use the auto-injector pen as you have been instructed.  · Get medical help. Call your local emergency services (911 in the [place] Do not drive yourself to the hospital.  Summary  · An insect bite can make your skin red, itchy, and swollen.  · Treatment is usually not needed. Symptoms often go away on their own. When treatment is recommended, it may involve taking medicine, applying medicine to the area, or applying ice.  · Apply or take over-the-counter and prescription medicines only as told by your health care provider.  · Use insect repellent to help prevent insect bites.  · Contact a health care provider if you have any signs of infection in the bite area.  This information is not intended to replace advice given to you by your health care provider. Make sure you discuss any questions you have  with your health care provider.  Document Revised: 06/28/2019 Document Reviewed: 06/28/2019  Elsevier Patient Education © 2021 [employer]

## 2021-12-06 NOTE — PROGRESS NOTES
You have chosen to receive care through a telehealth visit.  Do you consent to use a video/audio connection for your medical care today? Yes     CHIEF COMPLAINT  Chief Complaint   Patient presents with   • Rash         HPI  Rahul Walters is a 31 y.o. male  presents with complaint of rash from insect bites from an bed bug infestation.  Needs something to stop itching on back    Review of Systems   Skin: Positive for rash.   All other systems reviewed and are negative.      Past Medical History:   Diagnosis Date   • Dental caries    • GERD (gastroesophageal reflux disease)    • Gingivitis    • HTN (hypertension)    • Obesity    • Periodontal disease    • Tobacco dependence        Family History   Problem Relation Age of Onset   • Heart attack Mother    • Coronary artery disease Mother    • Stroke Father    • Hypertension Father        Social History     Socioeconomic History   • Marital status:      Spouse name: Suni   • Number of children: 2   • Years of education: H.S.   • Highest education level: High school graduate   Tobacco Use   • Smoking status: Former Smoker     Packs/day: 1.50     Years: 12.00     Pack years: 18.00     Types: Cigarettes   • Smokeless tobacco: Never Used   Substance and Sexual Activity   • Alcohol use: No   • Drug use: No   • Sexual activity: Yes     Partners: Female     Birth control/protection: Tubal ligation         There were no vitals taken for this visit.    PHYSICAL EXAM  Physical Exam   Constitutional: He is oriented to person, place, and time. He appears well-developed and well-nourished. He does not have a sickly appearance. He does not appear ill.   HENT:   Head: Normocephalic and atraumatic.   Pulmonary/Chest: Effort normal.  No respiratory distress.  Neurological: He is alert and oriented to person, place, and time.         Diagnoses and all orders for this visit:    1. Insect bite of back wall of thorax, unspecified location, initial encounter (Primary)  -      methylPREDNISolone (MEDROL) 4 MG dose pack; Take as directed on package instructions.  Dispense: 21 tablet; Refill: 0    --take Medrol as prescribed  --if no improvement in 2-3 days, will need to go to  for further evaluation      FOLLOW-UP  As discussed during visit with PCP/[facility] if no improvement or Urgent Care/Emergency Department if worsening of symptoms    Patient verbalizes understanding of medication dosage, comfort measures, instructions for treatment and follow-up.    KRIS Hernandez  12/06/2021  12:42 EST    This visit was performed via Telehealth.  This patient has been instructed to follow-up with their primary care provider if their symptoms worsen or the treatment provided does not resolve their illness.

## 2021-12-24 ENCOUNTER — TELEMEDICINE (OUTPATIENT)
Dept: FAMILY MEDICINE CLINIC | Facility: TELEHEALTH | Age: 31
End: 2021-12-24

## 2021-12-24 VITALS — TEMPERATURE: 98.8 F

## 2021-12-24 DIAGNOSIS — R50.9 FEVER, UNSPECIFIED FEVER CAUSE: ICD-10-CM

## 2021-12-24 DIAGNOSIS — R05.9 COUGH: ICD-10-CM

## 2021-12-24 DIAGNOSIS — J02.9 PHARYNGITIS, UNSPECIFIED ETIOLOGY: ICD-10-CM

## 2021-12-24 DIAGNOSIS — Z20.822 ENCOUNTER BY TELEHEALTH FOR SUSPECTED COVID-19: Primary | ICD-10-CM

## 2021-12-24 PROCEDURE — 99213 OFFICE O/P EST LOW 20 MIN: CPT | Performed by: NURSE PRACTITIONER

## 2021-12-24 RX ORDER — AZITHROMYCIN 250 MG/1
TABLET, FILM COATED ORAL
Qty: 6 TABLET | Refills: 0 | Status: SHIPPED | OUTPATIENT
Start: 2021-12-24 | End: 2022-04-23

## 2021-12-24 NOTE — PATIENT INSTRUCTIONS
Cough, Adult  A cough helps to clear your throat and lungs. A cough may be a sign of an illness or another medical condition.  An acute cough may only last 2-3 weeks, while a chronic cough may last 8 or more weeks.  Many things can cause a cough. They include:  · Germs (viruses or bacteria) that attack the airway.  · Breathing in things that bother (irritate) your lungs.  · Allergies.  · Asthma.  · Mucus that runs down the back of your throat (postnasal drip).  · Smoking.  · Acid backing up from the stomach into the tube that moves food from the mouth to the stomach (gastroesophageal reflux).  · Some medicines.  · Lung problems.  · Other medical conditions, such as heart failure or a blood clot in the lung (pulmonary embolism).  Follow these instructions at home:  Medicines  · Take over-the-counter and prescription medicines only as told by your doctor.  · Talk with your doctor before you take medicines that stop a cough (cough suppressants).  Lifestyle    · Do not smoke, and try not to be around smoke. Do not use any products that contain nicotine or tobacco, such as cigarettes, e-cigarettes, and chewing tobacco. If you need help quitting, ask your doctor.  · Drink enough fluid to keep your pee (urine) pale yellow.  · Avoid caffeine.  · Do not drink alcohol if your doctor tells you not to drink.    General instructions    · Watch for any changes in your cough. Tell your doctor about them.  · Always cover your mouth when you cough.  · Stay away from things that make you cough, such as perfume, candles, campfire smoke, or cleaning products.  · If the air is dry, use a cool mist vaporizer or humidifier in your home.  · If your cough is worse at night, try using extra pillows to raise your head up higher while you sleep.  · Rest as needed.  · Keep all follow-up visits as told by your doctor. This is important.    Contact a doctor if:  · You have new symptoms.  · You cough up pus.  · Your cough does not get better after  2-3 weeks, or your cough gets worse.  · Cough medicine does not help your cough and you are not sleeping well.  · You have pain that gets worse or pain that is not helped with medicine.  · You have a fever.  · You are losing weight and you do not know why.  · You have night sweats.  Get help right away if:  · You cough up blood.  · You have trouble breathing.  · Your heartbeat is very fast.  These symptoms may be an emergency. Do not wait to see if the symptoms will go away. Get medical help right away. Call your local emergency services (911 in the [place] Do not drive yourself to the hospital.  Summary  · A cough helps to clear your throat and lungs. Many things can cause a cough.  · Take over-the-counter and prescription medicines only as told by your doctor.  · Always cover your mouth when you cough.  · Contact a doctor if you have new symptoms or you have a cough that does not get better or gets worse.  This information is not intended to replace advice given to you by your health care provider. Make sure you discuss any questions you have with your health care provider.  Document Revised: 02/05/2021 Document Reviewed: 01/06/2020  [employer] Patient Education © 2021 [employer]      Pharyngitis    Pharyngitis is a sore throat (pharynx). This is when there is redness, pain, and swelling in your throat. Most of the time, this condition gets better on its own. In some cases, you may need medicine.  Follow these instructions at home:  · Take over-the-counter and prescription medicines only as told by your doctor.  ? If you were prescribed an antibiotic medicine, take it as told by your doctor. Do not stop taking the antibiotic even if you start to feel better.  ? Do not give children aspirin. Aspirin has been linked to Reye syndrome.  · Drink enough water and fluids to keep your pee (urine) clear or pale yellow.  · Get a lot of rest.  · Rinse your mouth (gargle) with a salt-water mixture 3-4 times a day or as  needed. To make a salt-water mixture, completely dissolve ½-1 tsp of salt in 1 cup of warm water.  · If your doctor approves, you may use throat lozenges or sprays to soothe your throat.  Contact a doctor if:  · You have large, tender lumps in your neck.  · You have a rash.  · You cough up green, yellow-brown, or bloody spit.  Get help right away if:  · You have a stiff neck.  · You drool or cannot swallow liquids.  · You cannot drink or take medicines without throwing up.  · You have very bad pain that does not go away with medicine.  · You have problems breathing, and it is not from a stuffy nose.  · You have new pain and swelling in your knees, ankles, wrists, or elbows.  Summary  · Pharyngitis is a sore throat (pharynx). This is when there is redness, pain, and swelling in your throat.  · If you were prescribed an antibiotic medicine, take it as told by your doctor. Do not stop taking the antibiotic even if you start to feel better.  · Most of the time, pharyngitis gets better on its own. Sometimes, you may need medicine.  This information is not intended to replace advice given to you by your health care provider. Make sure you discuss any questions you have with your health care provider.  Document Revised: 11/30/2018 Document Reviewed: 01/23/2018  [employer] Patient Education © 2021 [employer]      Cough, Adult  A cough helps to clear your throat and lungs. A cough may be a sign of an illness or another medical condition.  An acute cough may only last 2-3 weeks, while a chronic cough may last 8 or more weeks.  Many things can cause a cough. They include:  · Germs (viruses or bacteria) that attack the airway.  · Breathing in things that bother (irritate) your lungs.  · Allergies.  · Asthma.  · Mucus that runs down the back of your throat (postnasal drip).  · Smoking.  · Acid backing up from the stomach into the tube that moves food from the mouth to the stomach (gastroesophageal reflux).  · Some  medicines.  · Lung problems.  · Other medical conditions, such as heart failure or a blood clot in the lung (pulmonary embolism).  Follow these instructions at home:  Medicines  · Take over-the-counter and prescription medicines only as told by your doctor.  · Talk with your doctor before you take medicines that stop a cough (cough suppressants).  Lifestyle    · Do not smoke, and try not to be around smoke. Do not use any products that contain nicotine or tobacco, such as cigarettes, e-cigarettes, and chewing tobacco. If you need help quitting, ask your doctor.  · Drink enough fluid to keep your pee (urine) pale yellow.  · Avoid caffeine.  · Do not drink alcohol if your doctor tells you not to drink.    General instructions    · Watch for any changes in your cough. Tell your doctor about them.  · Always cover your mouth when you cough.  · Stay away from things that make you cough, such as perfume, candles, campfire smoke, or cleaning products.  · If the air is dry, use a cool mist vaporizer or humidifier in your home.  · If your cough is worse at night, try using extra pillows to raise your head up higher while you sleep.  · Rest as needed.  · Keep all follow-up visits as told by your doctor. This is important.    Contact a doctor if:  · You have new symptoms.  · You cough up pus.  · Your cough does not get better after 2-3 weeks, or your cough gets worse.  · Cough medicine does not help your cough and you are not sleeping well.  · You have pain that gets worse or pain that is not helped with medicine.  · You have a fever.  · You are losing weight and you do not know why.  · You have night sweats.  Get help right away if:  · You cough up blood.  · You have trouble breathing.  · Your heartbeat is very fast.  These symptoms may be an emergency. Do not wait to see if the symptoms will go away. Get medical help right away. Call your local emergency services (911 in the [place] Do not drive yourself to the  [facility].  Summary  · A cough helps to clear your throat and lungs. Many things can cause a cough.  · Take over-the-counter and prescription medicines only as told by your doctor.  · Always cover your mouth when you cough.  · Contact a doctor if you have new symptoms or you have a cough that does not get better or gets worse.  This information is not intended to replace advice given to you by your health care provider. Make sure you discuss any questions you have with your health care provider.  Document Revised: 02/05/2021 Document Reviewed: 01/06/2020  [employer] Patient Education © 2021 [employer]      Pharyngitis    Pharyngitis is a sore throat (pharynx). This is when there is redness, pain, and swelling in your throat. Most of the time, this condition gets better on its own. In some cases, you may need medicine.  Follow these instructions at home:  · Take over-the-counter and prescription medicines only as told by your doctor.  ? If you were prescribed an antibiotic medicine, take it as told by your doctor. Do not stop taking the antibiotic even if you start to feel better.  ? Do not give children aspirin. Aspirin has been linked to Reye syndrome.  · Drink enough water and fluids to keep your pee (urine) clear or pale yellow.  · Get a lot of rest.  · Rinse your mouth (gargle) with a salt-water mixture 3-4 times a day or as needed. To make a salt-water mixture, completely dissolve ½-1 tsp of salt in 1 cup of warm water.  · If your doctor approves, you may use throat lozenges or sprays to soothe your throat.  Contact a doctor if:  · You have large, tender lumps in your neck.  · You have a rash.  · You cough up green, yellow-brown, or bloody spit.  Get help right away if:  · You have a stiff neck.  · You drool or cannot swallow liquids.  · You cannot drink or take medicines without throwing up.  · You have very bad pain that does not go away with medicine.  · You have problems breathing, and it is not from a  stuffy nose.  · You have new pain and swelling in your knees, ankles, wrists, or elbows.  Summary  · Pharyngitis is a sore throat (pharynx). This is when there is redness, pain, and swelling in your throat.  · If you were prescribed an antibiotic medicine, take it as told by your doctor. Do not stop taking the antibiotic even if you start to feel better.  · Most of the time, pharyngitis gets better on its own. Sometimes, you may need medicine.  This information is not intended to replace advice given to you by your health care provider. Make sure you discuss any questions you have with your health care provider.  Document Revised: 11/30/2018 Document Reviewed: 01/23/2018  [employer] Patient Education © 2021 [employer]    10 Things You Can Do to Manage Your COVID-19 Symptoms at Home  If you have possible or confirmed COVID-19:  1. Stay home except to get medical care.  2. Monitor your symptoms carefully. If your symptoms get worse, call your healthcare provider immediately.  3. Get rest and stay hydrated.  4. If you have a medical appointment, call the healthcare provider ahead of time and tell them that you have or may have COVID-19.  5. For medical emergencies, call 911 and notify the dispatch personnel that you have or may have COVID-19.  6. Cover your cough and sneezes with a tissue or use the inside of your elbow.  7. Wash your hands often with soap and water for at least 20 seconds or clean your hands with an alcohol-based hand  that contains at least 60% alcohol.  8. As much as possible, stay in a specific room and away from other people in your home. Also, you should use a separate bathroom, if available. If you need to be around other people in or outside of the home, wear a mask.  9. Avoid sharing personal items with other people in your household, like dishes, towels, and bedding.  10. Clean all surfaces that are touched often, like counters, tabletops, and doorknobs. Use household cleaning  sprays or wipes according to the label instructions.  cdc.gov/coronavirus  07/16/2021  This information is not intended to replace advice given to you by your health care provider. Make sure you discuss any questions you have with your health care provider.  Document Revised: 08/04/2021 Document Reviewed: 08/04/2021  Elsevier Patient Education © 2021 [employer] Inc.

## 2021-12-24 NOTE — PROGRESS NOTES
You have chosen to receive care through a telehealth visit.  Do you consent to use a video/audio connection for your medical care today? Yes     CHIEF COMPLAINT  Chief Complaint   Patient presents with   • Sore Throat     he suspects strep throat   • Cough   • Fever         HPI  Rahul Walters is a 31 y.o. male  presents with complaint of sore throat with white exudate that he has had for 4 days now with associated fever max 102 and cough. He thinks he may have strep throat. His son has had the flu in the last week. He has had 2 covid vaccines. He is taking Tylenol for pain. He states the sore throat pain is moderate and he denies headache or nausea/vomiting. He reports no lymph node swelling. He declines a covid test stating her will get a rapid test and check at home.     Review of Systems   Constitutional: Positive for activity change, fatigue and fever.   HENT: Positive for congestion, sinus pressure and sore throat.    Respiratory: Positive for cough. Negative for shortness of breath, wheezing and stridor.    Cardiovascular: Negative.    Gastrointestinal: Negative.    Musculoskeletal: Negative.    Skin: Negative.    Neurological: Negative for headaches.   Hematological: Negative.        Past Medical History:   Diagnosis Date   • Dental caries    • GERD (gastroesophageal reflux disease)    • Gingivitis    • HTN (hypertension)    • Obesity    • Periodontal disease    • Tobacco dependence        Family History   Problem Relation Age of Onset   • Heart attack Mother    • Coronary artery disease Mother    • Stroke Father    • Hypertension Father        Social History     Socioeconomic History   • Marital status:      Spouse name: Suni   • Number of children: 2   • Years of education: H.S.   • Highest education level: High school graduate   Tobacco Use   • Smoking status: Former Smoker     Packs/day: 1.50     Years: 12.00     Pack years: 18.00     Types: Cigarettes   • Smokeless tobacco: Never  Used   Substance and Sexual Activity   • Alcohol use: No   • Drug use: No   • Sexual activity: Yes     Partners: Female     Birth control/protection: Tubal ligation         Temp 98.8 °F (37.1 °C)     PHYSICAL EXAM  Physical Exam   Constitutional: He is oriented to person, place, and time. He appears well-developed and well-nourished. He does not have a sickly appearance. He does not appear ill. No distress.   HENT:   Head: Normocephalic and atraumatic.   Right Ear: Hearing normal.   Left Ear: Hearing normal.   Nose: Nose normal. Right sinus exhibits no maxillary sinus tenderness and no frontal sinus tenderness. Left sinus exhibits no maxillary sinus tenderness and no frontal sinus tenderness.   Mouth/Throat: Mouth/Lips are normal.Mucous membranes are normal.   Oral pharynx clear without exudate observed.    Pulmonary/Chest: Effort normal. No stridor.  No respiratory distress. He no audible wheeze...  Lymphadenopathy:     He has no cervical adenopathy.   Neurological: He is alert and oriented to person, place, and time.   Psychiatric: He has a normal mood and affect.   Vitals reviewed.      Results for orders placed or performed in visit on 02/06/19   Comprehensive Metabolic Panel    Specimen: Arm, Left; Blood   Result Value Ref Range    Glucose 74 70 - 100 mg/dL    BUN 19 9 - 23 mg/dL    Creatinine 0.94 0.60 - 1.30 mg/dL    Sodium 138 132 - 146 mmol/L    Potassium 4.5 3.5 - 5.5 mmol/L    Chloride 106 99 - 109 mmol/L    CO2 28.0 20.0 - 31.0 mmol/L    Calcium 9.6 8.7 - 10.4 mg/dL    Total Protein 6.8 5.7 - 8.2 g/dL    Albumin 4.70 3.20 - 4.80 g/dL    ALT (SGPT) 35 7 - 40 U/L    AST (SGOT) 27 0 - 33 U/L    Alkaline Phosphatase 67 25 - 100 U/L    Total Bilirubin 0.3 0.3 - 1.2 mg/dL    eGFR Non African Amer 96 >60 mL/min/1.73    Globulin 2.1 gm/dL    A/G Ratio 2.2 1.5 - 2.5 g/dL    BUN/Creatinine Ratio 20.2 7.0 - 25.0    Anion Gap 4.0 3.0 - 11.0 mmol/L   Lipid Panel    Specimen: Arm, Left; Blood   Result Value Ref Range     Total Cholesterol 149 0 - 200 mg/dL    Triglycerides 255 (H) 0 - 150 mg/dL    HDL Cholesterol 25 (L) 40 - 60 mg/dL    LDL Cholesterol  109 0 - 130 mg/dL   TSH    Specimen: Arm, Left; Blood   Result Value Ref Range    TSH 1.635 0.350 - 5.350 mIU/mL   Uric Acid    Specimen: Arm, Left; Blood   Result Value Ref Range    Uric Acid 7.2 3.7 - 9.2 mg/dL   C-reactive Protein    Specimen: Arm, Left; Blood   Result Value Ref Range    C-Reactive Protein 1.16 (H) 0.00 - 1.00 mg/dL   HIV-1 / O / 2 Ag / Antibody 4th Generation    Specimen: Arm, Left; Blood   Result Value Ref Range    HIV-1/ HIV-2 Non-Reactive Non-Reactive   Hemoglobin A1c    Specimen: Arm, Left; Blood   Result Value Ref Range    Hemoglobin A1C 5.50 4.80 - 5.60 %   CBC Auto Differential    Specimen: Arm, Left; Blood   Result Value Ref Range    WBC 9.26 3.50 - 10.80 10*3/mm3    RBC 5.31 4.20 - 5.76 10*6/mm3    Hemoglobin 16.1 13.1 - 17.5 g/dL    Hematocrit 47.5 38.9 - 50.9 %    MCV 89.5 80.0 - 99.0 fL    MCH 30.3 27.0 - 31.0 pg    MCHC 33.9 32.0 - 36.0 g/dL    RDW 13.1 11.3 - 14.5 %    RDW-SD 42.7 37.0 - 54.0 fl    MPV 12.5 (H) 6.0 - 12.0 fL    Platelets 220 150 - 450 10*3/mm3    Neutrophil % 58.7 41.0 - 71.0 %    Lymphocyte % 29.6 24.0 - 44.0 %    Monocyte % 9.1 0.0 - 12.0 %    Eosinophil % 2.2 0.0 - 3.0 %    Basophil % 0.4 0.0 - 1.0 %    Immature Grans % 0.2 0.0 - 0.6 %    Neutrophils, Absolute 5.44 1.50 - 8.30 10*3/mm3    Lymphocytes, Absolute 2.74 0.60 - 4.80 10*3/mm3    Monocytes, Absolute 0.84 0.00 - 1.00 10*3/mm3    Eosinophils, Absolute 0.20 0.00 - 0.30 10*3/mm3    Basophils, Absolute 0.04 0.00 - 0.20 10*3/mm3    Immature Grans, Absolute 0.02 0.00 - 0.03 10*3/mm3   POC Urinalysis Dipstick, Automated    Specimen: Urine   Result Value Ref Range    Color Yellow Yellow, Straw, Dark Yellow, Sarah    Clarity, UA Clear Clear    Specific Gravity  1.030 1.005 - 1.030    pH, Urine 6.0 5.0 - 8.0    Leukocytes Negative Negative    Nitrite, UA Negative Negative     Protein, POC Negative Negative mg/dL    Glucose, UA Negative Negative, 1000 mg/dL (3+) mg/dL    Ketones, UA Negative Negative    Urobilinogen, UA Normal Normal    Bilirubin Negative Negative    Blood, UA Negative Negative       Diagnoses and all orders for this visit:    1. Encounter by telehealth for suspected COVID-19 (Primary)  -     QUESTIONNAIRE SERIES    2. Pharyngitis, unspecified etiology  -     azithromycin (Zithromax Z-Rashid) 250 MG tablet; Take 2 tablets by mouth on day 1, then 1 tablet daily on days 2-5  Dispense: 6 tablet; Refill: 0    3. Cough    4. Fever, unspecified fever cause    Recommend testing for covid asap. Hold antibiotic until results are available.   Quarantine until test results available.   Recommend supportive treatment with increase fluids and rest.   Warm salt water gargles prn sore throat   mg. Po every 6 hours prn sore throat or fever.        FOLLOW-UP  As discussed during visit with PCP/[facility] if no improvement or Urgent Care/Emergency Department if worsening of symptoms    Patient verbalizes understanding of medication dosage, comfort measures, instructions for treatment and follow-up.    Rachel Greco, KRIS  12/24/2021  16:53 EST    This visit was performed via Telehealth.  This patient has been instructed to follow-up with their primary care provider if their symptoms worsen or the treatment provided does not resolve their illness.

## 2022-01-11 DIAGNOSIS — I10 ESSENTIAL HYPERTENSION: ICD-10-CM

## 2022-01-11 RX ORDER — PANTOPRAZOLE SODIUM 40 MG/1
40 TABLET, DELAYED RELEASE ORAL DAILY
Qty: 90 TABLET | Refills: 0 | Status: SHIPPED | OUTPATIENT
Start: 2022-01-11 | End: 2022-04-01 | Stop reason: SDUPTHER

## 2022-01-11 RX ORDER — LISINOPRIL 5 MG/1
5 TABLET ORAL DAILY
Qty: 90 TABLET | Refills: 0 | Status: SHIPPED | OUTPATIENT
Start: 2022-01-11 | End: 2022-04-01 | Stop reason: SDUPTHER

## 2022-03-24 ENCOUNTER — E-VISIT (OUTPATIENT)
Dept: ADMINISTRATIVE | Facility: OTHER | Age: 32
End: 2022-03-24

## 2022-03-24 NOTE — E-VISIT ESCALATED
"   Chief Complaint: Confirmed exposure to sexually transmitted infection   Patient was shown the following escalation message:   Some conditions need a visit with a healthcare provider   This interview can help you get treatment for exposure to a confirmed case of gonorrhea, chlamydia, or trichomoniasis. Since your partner was diagnosed with a different STI, you should speak with a provider to get care.   ----------   Patient Interview Transcript:   Has your sexual partner tested positive for gonorrhea, chlamydia, or trichomoniasis (also called \"trich\")? This interview can help with confirmed exposure to these three sexually transmitted infections (STIs). In other cases, the best option for you is to speak with a provider. Select all that apply.    Yes, a different STI   Not selected:    Yes, gonorrhea    Yes, chlamydia    Yes, trichomoniasis    No   ----------   Medical history   Medical history data does not currently exist for this patient.    "

## 2022-04-01 DIAGNOSIS — I10 ESSENTIAL HYPERTENSION: ICD-10-CM

## 2022-04-01 RX ORDER — LISINOPRIL 5 MG/1
5 TABLET ORAL DAILY
Qty: 90 TABLET | Refills: 0 | OUTPATIENT
Start: 2022-04-01 | End: 2022-04-30

## 2022-04-01 RX ORDER — PANTOPRAZOLE SODIUM 40 MG/1
40 TABLET, DELAYED RELEASE ORAL DAILY
Qty: 90 TABLET | Refills: 0 | Status: SHIPPED | OUTPATIENT
Start: 2022-04-01 | End: 2022-07-20

## 2022-04-23 ENCOUNTER — TELEMEDICINE (OUTPATIENT)
Dept: FAMILY MEDICINE CLINIC | Facility: TELEHEALTH | Age: 32
End: 2022-04-23

## 2022-04-23 DIAGNOSIS — R59.9 ADENOPATHY: Primary | ICD-10-CM

## 2022-04-23 PROCEDURE — 99213 OFFICE O/P EST LOW 20 MIN: CPT | Performed by: NURSE PRACTITIONER

## 2022-04-23 RX ORDER — AMOXICILLIN AND CLAVULANATE POTASSIUM 875; 125 MG/1; MG/1
1 TABLET, FILM COATED ORAL 2 TIMES DAILY
Qty: 20 TABLET | Refills: 0 | Status: SHIPPED | OUTPATIENT
Start: 2022-04-23 | End: 2022-05-03

## 2022-04-23 NOTE — PROGRESS NOTES
You have chosen to receive care through a telehealth visit.  Do you consent to use a video/audio connection for your medical care today? Yes     CHIEF COMPLAINT  Chief Complaint   Patient presents with   • Adenopathy         HPI  Rahul Walters is a 31 y.o. male  presents with complaint of left sided adenopathy. He has this happen before and his MD treated him for sinusitis with antibiotics. He is having teeth pain the last day or 2 across the top teeth from left to right. No sore throat. No sinus drainage or congestion.     Review of Systems   Constitutional: Negative for chills, diaphoresis, fatigue and fever.   HENT: Positive for dental problem. Negative for congestion, postnasal drip, rhinorrhea, sinus pressure, sinus pain, sneezing and sore throat.         Teeth hurt across the top of his mouth.    Respiratory: Negative for cough.    Gastrointestinal: Negative for diarrhea, nausea and vomiting.   Genitourinary: Scrotal swelling: left    Musculoskeletal: Negative for myalgias.   Neurological: Negative for headaches.   Hematological: Positive for adenopathy.       Past Medical History:   Diagnosis Date   • Dental caries    • GERD (gastroesophageal reflux disease)    • Gingivitis    • HTN (hypertension)    • Obesity    • Periodontal disease    • Tobacco dependence        Family History   Problem Relation Age of Onset   • Heart attack Mother    • Coronary artery disease Mother    • Stroke Father    • Hypertension Father        Social History     Socioeconomic History   • Marital status:      Spouse name: Suni   • Number of children: 2   • Years of education: H.S.   • Highest education level: High school graduate   Tobacco Use   • Smoking status: Former Smoker     Packs/day: 1.50     Years: 12.00     Pack years: 18.00     Types: Cigarettes   • Smokeless tobacco: Never Used   Substance and Sexual Activity   • Alcohol use: No   • Drug use: No   • Sexual activity: Yes     Partners: Female     Birth  control/protection: Tubal ligation       Rahul Walters  reports that he has quit smoking. His smoking use included cigarettes. He has a 18.00 pack-year smoking history. He has never used smokeless tobacco. No tobacco use               There were no vitals taken for this visit.    PHYSICAL EXAM  Physical Exam   Constitutional: He is oriented to person, place, and time. He appears well-developed and well-nourished. He does not have a sickly appearance. He does not appear ill. No distress.   HENT:   Head: Normocephalic and atraumatic.   Nose: Right sinus exhibits no maxillary sinus tenderness (per patient report ) and no frontal sinus tenderness (per patient report ). Left sinus exhibits no maxillary sinus tenderness (per patient report ) and no frontal sinus tenderness (per patient report ).   Mouth/Throat: Mouth/Lips are normal.Uvula is midline. Mucous membranes are not pale, not dry, not cyanotic and erythematous. No oral lesions. Abnormal dentition. Dental caries present. No dental abscesses (No obvious swelling of gums, but multiple broken teeth on left upper jaw. ). No oropharyngeal exudate or tonsillar abscesses. No tonsillar exudate. no white patchesnot blistered  Pulmonary/Chest: Effort normal.  No respiratory distress.  Lymphadenopathy:     He has cervical adenopathy (left anterior neck with tenderness and paplation of lymph node per patient directed exam).   Neurological: He is alert and oriented to person, place, and time.   Skin: Skin is dry.   Psychiatric: He has a normal mood and affect.         Diagnoses and all orders for this visit:    1. Adenopathy (Primary)    Other orders  -     amoxicillin-clavulanate (Augmentin) 875-125 MG per tablet; Take 1 tablet by mouth 2 (Two) Times a Day for 10 days.  Dispense: 20 tablet; Refill: 0    possibly developing abscess. Multiple broken teeth and dental caries. No obvious signs. Some teeth pain, but mild and across the top teeth from left to right.      The use of a video visit has been reviewed with the patient and verbal informd consent has een obtained. Myself and Rahul Walters participated in this visit. The patient is located in [address] [place] [address]. I am located in [place], [place]. Mychart and Zoom were utilized. I spent 7 minutes in the patient's chart for this visit.           Alda Cantu, KRIS  04/23/2022  07:46 EDT

## 2022-04-30 ENCOUNTER — APPOINTMENT (OUTPATIENT)
Dept: CT IMAGING | Facility: HOSPITAL | Age: 32
End: 2022-04-30

## 2022-04-30 ENCOUNTER — HOSPITAL ENCOUNTER (EMERGENCY)
Facility: HOSPITAL | Age: 32
Discharge: HOME | End: 2022-04-30
Attending: EMERGENCY MEDICINE | Admitting: EMERGENCY MEDICINE

## 2022-04-30 VITALS
HEIGHT: 71 IN | TEMPERATURE: 99.9 F | RESPIRATION RATE: 18 BRPM | OXYGEN SATURATION: 95 % | WEIGHT: 260 LBS | SYSTOLIC BLOOD PRESSURE: 156 MMHG | HEART RATE: 95 BPM | DIASTOLIC BLOOD PRESSURE: 96 MMHG | BODY MASS INDEX: 36.4 KG/M2

## 2022-04-30 DIAGNOSIS — F17.200 TOBACCO DEPENDENCE: ICD-10-CM

## 2022-04-30 DIAGNOSIS — I10 ELEVATED BLOOD PRESSURE READING WITH DIAGNOSIS OF HYPERTENSION: ICD-10-CM

## 2022-04-30 DIAGNOSIS — Z86.39 HISTORY OF OBESITY: ICD-10-CM

## 2022-04-30 DIAGNOSIS — R42 DIZZINESS: ICD-10-CM

## 2022-04-30 DIAGNOSIS — Z82.3 FAMILY HISTORY OF STROKE: ICD-10-CM

## 2022-04-30 DIAGNOSIS — R51.9 GENERALIZED HEADACHE: Primary | ICD-10-CM

## 2022-04-30 LAB
ALBUMIN SERPL-MCNC: 4.4 G/DL (ref 3.5–5.2)
ALBUMIN/GLOB SERPL: 1.4 G/DL
ALP SERPL-CCNC: 85 U/L (ref 39–117)
ALT SERPL W P-5'-P-CCNC: 39 U/L (ref 1–41)
ANION GAP SERPL CALCULATED.3IONS-SCNC: 14 MMOL/L (ref 5–15)
AST SERPL-CCNC: 29 U/L (ref 1–40)
BASOPHILS # BLD AUTO: 0.05 10*3/MM3 (ref 0–0.2)
BASOPHILS NFR BLD AUTO: 0.4 % (ref 0–1.5)
BILIRUB SERPL-MCNC: 0.3 MG/DL (ref 0–1.2)
BUN SERPL-MCNC: 9 MG/DL (ref 6–20)
BUN/CREAT SERPL: 10.6 (ref 7–25)
CALCIUM SPEC-SCNC: 9.6 MG/DL (ref 8.6–10.5)
CHLORIDE SERPL-SCNC: 101 MMOL/L (ref 98–107)
CO2 SERPL-SCNC: 23 MMOL/L (ref 22–29)
CREAT SERPL-MCNC: 0.85 MG/DL (ref 0.76–1.27)
DEPRECATED RDW RBC AUTO: 37.8 FL (ref 37–54)
EGFRCR SERPLBLD CKD-EPI 2021: 119.1 ML/MIN/1.73
EOSINOPHIL # BLD AUTO: 0.15 10*3/MM3 (ref 0–0.4)
EOSINOPHIL NFR BLD AUTO: 1.3 % (ref 0.3–6.2)
ERYTHROCYTE [DISTWIDTH] IN BLOOD BY AUTOMATED COUNT: 12.6 % (ref 12.3–15.4)
FLUAV SUBTYP SPEC NAA+PROBE: NOT DETECTED
FLUBV RNA ISLT QL NAA+PROBE: NOT DETECTED
GLOBULIN UR ELPH-MCNC: 3.2 GM/DL
GLUCOSE SERPL-MCNC: 126 MG/DL (ref 65–99)
HCT VFR BLD AUTO: 50.3 % (ref 37.5–51)
HGB BLD-MCNC: 17.6 G/DL (ref 13–17.7)
IMM GRANULOCYTES # BLD AUTO: 0.04 10*3/MM3 (ref 0–0.05)
IMM GRANULOCYTES NFR BLD AUTO: 0.3 % (ref 0–0.5)
LYMPHOCYTES # BLD AUTO: 2.38 10*3/MM3 (ref 0.7–3.1)
LYMPHOCYTES NFR BLD AUTO: 20.3 % (ref 19.6–45.3)
MCH RBC QN AUTO: 29.3 PG (ref 26.6–33)
MCHC RBC AUTO-ENTMCNC: 35 G/DL (ref 31.5–35.7)
MCV RBC AUTO: 83.7 FL (ref 79–97)
MONOCYTES # BLD AUTO: 0.84 10*3/MM3 (ref 0.1–0.9)
MONOCYTES NFR BLD AUTO: 7.1 % (ref 5–12)
NEUTROPHILS NFR BLD AUTO: 70.6 % (ref 42.7–76)
NEUTROPHILS NFR BLD AUTO: 8.29 10*3/MM3 (ref 1.7–7)
NRBC BLD AUTO-RTO: 0 /100 WBC (ref 0–0.2)
PLATELET # BLD AUTO: 233 10*3/MM3 (ref 140–450)
PMV BLD AUTO: 11.4 FL (ref 6–12)
POTASSIUM SERPL-SCNC: 4.3 MMOL/L (ref 3.5–5.2)
PROT SERPL-MCNC: 7.6 G/DL (ref 6–8.5)
RBC # BLD AUTO: 6.01 10*6/MM3 (ref 4.14–5.8)
SARS-COV-2 RNA PNL SPEC NAA+PROBE: NOT DETECTED
SODIUM SERPL-SCNC: 138 MMOL/L (ref 136–145)
TROPONIN T SERPL-MCNC: <0.01 NG/ML (ref 0–0.03)
TSH SERPL DL<=0.05 MIU/L-ACNC: 1.63 UIU/ML (ref 0.27–4.2)
WBC NRBC COR # BLD: 11.75 10*3/MM3 (ref 3.4–10.8)

## 2022-04-30 PROCEDURE — 96374 THER/PROPH/DIAG INJ IV PUSH: CPT

## 2022-04-30 PROCEDURE — 70498 CT ANGIOGRAPHY NECK: CPT

## 2022-04-30 PROCEDURE — 93005 ELECTROCARDIOGRAM TRACING: CPT | Performed by: PHYSICIAN ASSISTANT

## 2022-04-30 PROCEDURE — 99284 EMERGENCY DEPT VISIT MOD MDM: CPT

## 2022-04-30 PROCEDURE — 25010000002 METOCLOPRAMIDE PER 10 MG: Performed by: PHYSICIAN ASSISTANT

## 2022-04-30 PROCEDURE — 0 IOPAMIDOL PER 1 ML: Performed by: EMERGENCY MEDICINE

## 2022-04-30 PROCEDURE — 80050 GENERAL HEALTH PANEL: CPT | Performed by: PHYSICIAN ASSISTANT

## 2022-04-30 PROCEDURE — 87636 SARSCOV2 & INF A&B AMP PRB: CPT | Performed by: PHYSICIAN ASSISTANT

## 2022-04-30 PROCEDURE — 84484 ASSAY OF TROPONIN QUANT: CPT | Performed by: PHYSICIAN ASSISTANT

## 2022-04-30 PROCEDURE — 70496 CT ANGIOGRAPHY HEAD: CPT

## 2022-04-30 PROCEDURE — 70450 CT HEAD/BRAIN W/O DYE: CPT

## 2022-04-30 RX ORDER — METOCLOPRAMIDE HYDROCHLORIDE 5 MG/ML
10 INJECTION INTRAMUSCULAR; INTRAVENOUS ONCE
Status: COMPLETED | OUTPATIENT
Start: 2022-04-30 | End: 2022-04-30

## 2022-04-30 RX ORDER — SODIUM CHLORIDE 0.9 % (FLUSH) 0.9 %
10 SYRINGE (ML) INJECTION AS NEEDED
Status: DISCONTINUED | OUTPATIENT
Start: 2022-04-30 | End: 2022-04-30 | Stop reason: HOSPADM

## 2022-04-30 RX ORDER — LISINOPRIL 20 MG/1
20 TABLET ORAL DAILY
Qty: 30 TABLET | Refills: 0 | Status: SHIPPED | OUTPATIENT
Start: 2022-04-30 | End: 2022-05-06 | Stop reason: SDUPTHER

## 2022-04-30 RX ORDER — LISINOPRIL 10 MG/1
20 TABLET ORAL ONCE
Status: COMPLETED | OUTPATIENT
Start: 2022-04-30 | End: 2022-04-30

## 2022-04-30 RX ADMIN — LISINOPRIL 20 MG: 10 TABLET ORAL at 19:44

## 2022-04-30 RX ADMIN — IOPAMIDOL 75 ML: 755 INJECTION, SOLUTION INTRAVENOUS at 20:21

## 2022-04-30 RX ADMIN — METOCLOPRAMIDE 10 MG: 5 INJECTION, SOLUTION INTRAMUSCULAR; INTRAVENOUS at 19:44

## 2022-04-30 RX ADMIN — SODIUM CHLORIDE 1000 ML: 9 INJECTION, SOLUTION INTRAVENOUS at 19:44

## 2022-05-01 LAB
QT INTERVAL: 310 MS
QTC INTERVAL: 406 MS

## 2022-05-01 NOTE — DISCHARGE INSTRUCTIONS
ER evaluation revealed normal CBC, chemistries, and normal thyroid studies.  EKG and troponin were also within normal limits.  CT angiogram of the brain and neck with contrast revealed no evidence of stroke or blockage in the neck.  Blood pressure improved with lisinopril 20 mg by mouth and IV fluid bolus and migraine cocktail.  Will refer patient closely to the hypertension clinic.  Patient strongly recommended to discontinue smoking, and we also provided information on a Dash eating plan.  Recommend routine exercise 3 days a week for at least 30 minutes and weight loss.  Recommend close PCP follow-up for recheck.  Return to the ER if any worsening symptoms.

## 2022-05-06 ENCOUNTER — TELEMEDICINE (OUTPATIENT)
Dept: FAMILY MEDICINE CLINIC | Facility: CLINIC | Age: 32
End: 2022-05-06

## 2022-05-06 ENCOUNTER — APPOINTMENT (OUTPATIENT)
Dept: CT IMAGING | Facility: HOSPITAL | Age: 32
End: 2022-05-06

## 2022-05-06 ENCOUNTER — HOSPITAL ENCOUNTER (EMERGENCY)
Facility: HOSPITAL | Age: 32
Discharge: HOME | End: 2022-05-06
Attending: EMERGENCY MEDICINE | Admitting: EMERGENCY MEDICINE

## 2022-05-06 ENCOUNTER — TELEPHONE (OUTPATIENT)
Dept: FAMILY MEDICINE CLINIC | Facility: CLINIC | Age: 32
End: 2022-05-06

## 2022-05-06 ENCOUNTER — TELEMEDICINE (OUTPATIENT)
Dept: FAMILY MEDICINE CLINIC | Facility: TELEHEALTH | Age: 32
End: 2022-05-06

## 2022-05-06 VITALS
BODY MASS INDEX: 36.4 KG/M2 | SYSTOLIC BLOOD PRESSURE: 158 MMHG | OXYGEN SATURATION: 99 % | RESPIRATION RATE: 16 BRPM | WEIGHT: 260 LBS | HEART RATE: 101 BPM | TEMPERATURE: 98.9 F | HEIGHT: 71 IN | DIASTOLIC BLOOD PRESSURE: 96 MMHG

## 2022-05-06 DIAGNOSIS — J39.2 SWELLING OF THROAT: Primary | ICD-10-CM

## 2022-05-06 DIAGNOSIS — I10 PRIMARY HYPERTENSION: Primary | ICD-10-CM

## 2022-05-06 DIAGNOSIS — M79.89 SWELLING OF LEFT FOOT: ICD-10-CM

## 2022-05-06 DIAGNOSIS — R59.0 SUBMENTAL LYMPHADENOPATHY: Primary | ICD-10-CM

## 2022-05-06 DIAGNOSIS — F17.200 TOBACCO DEPENDENCE: ICD-10-CM

## 2022-05-06 PROBLEM — J02.9 PHARYNGITIS: Status: RESOLVED | Noted: 2020-04-17 | Resolved: 2022-05-06

## 2022-05-06 PROBLEM — B34.9 ACUTE VIRAL SYNDROME: Status: RESOLVED | Noted: 2020-07-08 | Resolved: 2022-05-06

## 2022-05-06 PROBLEM — R42 DIZZINESS OF UNKNOWN CAUSE: Status: RESOLVED | Noted: 2020-07-12 | Resolved: 2022-05-06

## 2022-05-06 LAB
ANION GAP SERPL CALCULATED.3IONS-SCNC: 11 MMOL/L (ref 5–15)
BASOPHILS # BLD AUTO: 0.02 10*3/MM3 (ref 0–0.2)
BASOPHILS NFR BLD AUTO: 0.2 % (ref 0–1.5)
BUN SERPL-MCNC: 9 MG/DL (ref 6–20)
BUN/CREAT SERPL: 10.3 (ref 7–25)
CALCIUM SPEC-SCNC: 9.6 MG/DL (ref 8.6–10.5)
CHLORIDE SERPL-SCNC: 105 MMOL/L (ref 98–107)
CO2 SERPL-SCNC: 24 MMOL/L (ref 22–29)
CREAT BLDA-MCNC: 0.9 MG/DL (ref 0.6–1.3)
CREAT SERPL-MCNC: 0.87 MG/DL (ref 0.76–1.27)
DEPRECATED RDW RBC AUTO: 39 FL (ref 37–54)
EGFRCR SERPLBLD CKD-EPI 2021: 118.3 ML/MIN/1.73
EOSINOPHIL # BLD AUTO: 0.11 10*3/MM3 (ref 0–0.4)
EOSINOPHIL NFR BLD AUTO: 1.3 % (ref 0.3–6.2)
ERYTHROCYTE [DISTWIDTH] IN BLOOD BY AUTOMATED COUNT: 12.7 % (ref 12.3–15.4)
GLUCOSE SERPL-MCNC: 96 MG/DL (ref 65–99)
HCT VFR BLD AUTO: 46.7 % (ref 37.5–51)
HGB BLD-MCNC: 16.2 G/DL (ref 13–17.7)
IMM GRANULOCYTES # BLD AUTO: 0.02 10*3/MM3 (ref 0–0.05)
IMM GRANULOCYTES NFR BLD AUTO: 0.2 % (ref 0–0.5)
LYMPHOCYTES # BLD AUTO: 2.25 10*3/MM3 (ref 0.7–3.1)
LYMPHOCYTES NFR BLD AUTO: 26.7 % (ref 19.6–45.3)
MCH RBC QN AUTO: 29.1 PG (ref 26.6–33)
MCHC RBC AUTO-ENTMCNC: 34.7 G/DL (ref 31.5–35.7)
MCV RBC AUTO: 84 FL (ref 79–97)
MONOCYTES # BLD AUTO: 0.76 10*3/MM3 (ref 0.1–0.9)
MONOCYTES NFR BLD AUTO: 9 % (ref 5–12)
NEUTROPHILS NFR BLD AUTO: 5.26 10*3/MM3 (ref 1.7–7)
NEUTROPHILS NFR BLD AUTO: 62.6 % (ref 42.7–76)
NRBC BLD AUTO-RTO: 0 /100 WBC (ref 0–0.2)
PLATELET # BLD AUTO: 218 10*3/MM3 (ref 140–450)
PMV BLD AUTO: 11.7 FL (ref 6–12)
POTASSIUM SERPL-SCNC: 4.4 MMOL/L (ref 3.5–5.2)
RBC # BLD AUTO: 5.56 10*6/MM3 (ref 4.14–5.8)
SODIUM SERPL-SCNC: 140 MMOL/L (ref 136–145)
WBC NRBC COR # BLD: 8.42 10*3/MM3 (ref 3.4–10.8)

## 2022-05-06 PROCEDURE — 63710000001 DEXAMETHASONE PER 0.25 MG: Performed by: PHYSICIAN ASSISTANT

## 2022-05-06 PROCEDURE — 80048 BASIC METABOLIC PNL TOTAL CA: CPT | Performed by: PHYSICIAN ASSISTANT

## 2022-05-06 PROCEDURE — 70491 CT SOFT TISSUE NECK W/DYE: CPT

## 2022-05-06 PROCEDURE — 85025 COMPLETE CBC W/AUTO DIFF WBC: CPT | Performed by: PHYSICIAN ASSISTANT

## 2022-05-06 PROCEDURE — 82565 ASSAY OF CREATININE: CPT

## 2022-05-06 PROCEDURE — 25010000002 IOPAMIDOL 61 % SOLUTION: Performed by: EMERGENCY MEDICINE

## 2022-05-06 PROCEDURE — 99213 OFFICE O/P EST LOW 20 MIN: CPT | Performed by: FAMILY MEDICINE

## 2022-05-06 PROCEDURE — 99213 OFFICE O/P EST LOW 20 MIN: CPT | Performed by: NURSE PRACTITIONER

## 2022-05-06 PROCEDURE — 99283 EMERGENCY DEPT VISIT LOW MDM: CPT

## 2022-05-06 RX ORDER — CLINDAMYCIN HYDROCHLORIDE 300 MG/1
300 CAPSULE ORAL 4 TIMES DAILY
Qty: 28 CAPSULE | Refills: 0 | Status: SHIPPED | OUTPATIENT
Start: 2022-05-06 | End: 2022-05-13

## 2022-05-06 RX ORDER — METHYLPREDNISOLONE 4 MG/1
TABLET ORAL
Qty: 21 TABLET | Refills: 0 | Status: SHIPPED | OUTPATIENT
Start: 2022-05-06 | End: 2022-06-06

## 2022-05-06 RX ORDER — LISINOPRIL 20 MG/1
20 TABLET ORAL DAILY
Qty: 30 TABLET | Refills: 1 | Status: SHIPPED | OUTPATIENT
Start: 2022-05-06 | End: 2022-07-20 | Stop reason: SDUPTHER

## 2022-05-06 RX ADMIN — IOPAMIDOL 75 ML: 612 INJECTION, SOLUTION INTRAVENOUS at 19:36

## 2022-05-06 RX ADMIN — DEXAMETHASONE 6 MG: 2 TABLET ORAL at 20:18

## 2022-05-06 NOTE — ED PROVIDER NOTES
EMERGENCY DEPARTMENT ENCOUNTER    Pt Name: Rahul Walters  MRN: [id number]  Pt :   1990  Room Number:  24SF/24  Date of encounter:  2022  PCP: Boom Benoit MD  ED Provider: Jad Tran PA-C    Historian: Patient      HPI:  Chief Complaint: Swelling under chin since this morning        Context: Rahul Walters is a 31 y.o. male who presents to the ED c/o swelling underneath his chin that began this morning.  Patient normally takes lisinopril 5 mg daily for hypertension control.  He was recently increased to 20 mg daily.  Patient called his primary care provider today to advise him of the swelling, and patient was referred to the emergency department out of concern for angioedema.  Patient states his symptoms have slightly improved throughout today, but he still has swelling and tenderness to the submental space.  He has no sore throat, no difficulty swallowing, no difficulty controlling secretions or airway.  No regional adenopathy.  No fevers chills or sweats.  He does have significant periodontal disease.  No stiff neck vision changes or photophobia.      PAST MEDICAL HISTORY  Past Medical History:   Diagnosis Date   • Dental caries    • GERD (gastroesophageal reflux disease)    • Gingivitis    • HTN (hypertension)    • Obesity    • Periodontal disease    • Tobacco dependence          PAST SURGICAL HISTORY  Past Surgical History:   Procedure Laterality Date   • WISDOM TOOTH EXTRACTION           FAMILY HISTORY  Family History   Problem Relation Age of Onset   • Heart attack Mother    • Coronary artery disease Mother    • Stroke Father    • Hypertension Father          SOCIAL HISTORY  Social History     Socioeconomic History   • Marital status:      Spouse name: Suni   • Number of children: 2   • Years of education: H.S.   • Highest education level: High school graduate   Tobacco Use   • Smoking status: Former Smoker     Packs/day: 1.50     Years: 12.00      Pack years: 18.00     Types: Cigarettes   • Smokeless tobacco: Never Used   Substance and Sexual Activity   • Alcohol use: No   • Drug use: No   • Sexual activity: Yes     Partners: Female     Birth control/protection: Tubal ligation         ALLERGIES  Patient has no known allergies.        REVIEW OF SYSTEMS  Review of Systems   Constitutional: Positive for activity change. Negative for appetite change, chills, fatigue and fever.   HENT: Positive for trouble swallowing. Negative for congestion, nosebleeds, rhinorrhea, sore throat and voice change.    Respiratory: Negative for cough, shortness of breath, wheezing and stridor.    Cardiovascular: Negative for chest pain and palpitations.   Gastrointestinal: Negative for abdominal pain, nausea and vomiting.   Genitourinary: Negative for difficulty urinating, flank pain, hematuria and urgency.   Musculoskeletal: Negative for arthralgias, back pain, myalgias and neck pain.   Neurological: Negative for dizziness, syncope, light-headedness and headaches.   Hematological: Negative for adenopathy.          All systems reviewed and negative except for those discussed in HPI.       PHYSICAL EXAM    I have reviewed the triage vital signs and nursing notes.    ED Triage Vitals [05/06/22 1701]   Temp Heart Rate Resp BP SpO2   98.9 °F (37.2 °C) 101 16 158/96 99 %      Temp src Heart Rate Source Patient Position BP Location FiO2 (%)   Oral Monitor Sitting Left arm --       Physical Exam  GENERAL:   Pleasant awake alert and oriented.  Hemodynamically stable, not in acute distress.  Afebrile.  HENT: Nares patent.  Oropharynx is clear airway is patent uvula is midline.  Teeth are in very poor repair with significant periodontal disease noted.  No tender fluctuant gingival masses noted.  There is some prominence to the submental space with slight tenderness noted per patient.  He is able to control secretions and swallow without difficulty.  There is no regional adenopathy or  meningismus.  EYES: No scleral icterus.  CV: Regular rhythm, regular rate.  RESPIRATORY: Normal effort.  No audible wheezes, rales or rhonchi.  ABDOMEN: Soft, nontender  MUSCULOSKELETAL: No deformities.   NEURO: Alert, moves all extremities, follows commands.  SKIN: Warm, dry, no rash visualized.        LAB RESULTS  Recent Results (from the past 24 hour(s))   CBC Auto Differential    Collection Time: 05/06/22  6:56 PM    Specimen: Blood   Result Value Ref Range    WBC 8.42 3.40 - 10.80 10*3/mm3    RBC 5.56 4.14 - 5.80 10*6/mm3    Hemoglobin 16.2 13.0 - 17.7 g/dL    Hematocrit 46.7 37.5 - 51.0 %    MCV 84.0 79.0 - 97.0 fL    MCH 29.1 26.6 - 33.0 pg    MCHC 34.7 31.5 - 35.7 g/dL    RDW 12.7 12.3 - 15.4 %    RDW-SD 39.0 37.0 - 54.0 fl    MPV 11.7 6.0 - 12.0 fL    Platelets 218 140 - 450 10*3/mm3    Neutrophil % 62.6 42.7 - 76.0 %    Lymphocyte % 26.7 19.6 - 45.3 %    Monocyte % 9.0 5.0 - 12.0 %    Eosinophil % 1.3 0.3 - 6.2 %    Basophil % 0.2 0.0 - 1.5 %    Immature Grans % 0.2 0.0 - 0.5 %    Neutrophils, Absolute 5.26 1.70 - 7.00 10*3/mm3    Lymphocytes, Absolute 2.25 0.70 - 3.10 10*3/mm3    Monocytes, Absolute 0.76 0.10 - 0.90 10*3/mm3    Eosinophils, Absolute 0.11 0.00 - 0.40 10*3/mm3    Basophils, Absolute 0.02 0.00 - 0.20 10*3/mm3    Immature Grans, Absolute 0.02 0.00 - 0.05 10*3/mm3    nRBC 0.0 0.0 - 0.2 /100 WBC   Basic Metabolic Panel    Collection Time: 05/06/22  6:56 PM    Specimen: Blood   Result Value Ref Range    Glucose 96 65 - 99 mg/dL    BUN 9 6 - 20 mg/dL    Creatinine 0.87 0.76 - 1.27 mg/dL    Sodium 140 136 - 145 mmol/L    Potassium 4.4 3.5 - 5.2 mmol/L    Chloride 105 98 - 107 mmol/L    CO2 24.0 22.0 - 29.0 mmol/L    Calcium 9.6 8.6 - 10.5 mg/dL    BUN/Creatinine Ratio 10.3 7.0 - 25.0    Anion Gap 11.0 5.0 - 15.0 mmol/L    eGFR 118.3 >60.0 mL/min/1.73   POC Creatinine    Collection Time: 05/06/22  7:05 PM    Specimen: Blood   Result Value Ref Range    Creatinine 0.90 0.60 - 1.30 mg/dL       If labs  were ordered, I independently reviewed the results.        RADIOLOGY  CT Soft Tissue Neck With Contrast    Result Date: 5/6/2022  DATE OF EXAM: 5/6/2022 7:34 PM  PROCEDURE: CT SOFT TISSUE NECK W CONTRAST-  INDICATIONS: Submental swelling- evaluate for Bao's angina  COMPARISON: No comparisons available.  TECHNIQUE: After the intravenous administration of 75 mL of Isovue 300, contiguous axial images were obtained through the neck. Coronal and sagittal reconstructions were performed. Automated exposure control and iterative reconstruction methods were used.  The radiation dose reduction device was turned on for each scan per the ALARA (As Low as Reasonably Achievable) protocol.  FINDINGS: There is no fat infiltration or fluid collection in the submandibular space. Submandibular glands are symmetric without surrounding inflammatory change or duct dilatation. There are enlarged submandibular lymph nodes bilaterally, measuring up to 1.8 cm on the right. The muscle and fat planes in the floor the mouth are preserved, with no abnormal fluid collection. There is no abnormal retropharyngeal fluid collection or prevertebral soft tissue swelling. There is no airway narrowing. The tonsillar tissues are within normal limits. Parotid glands have a normal appearance. Thyroid gland is unremarkable. Larynx appears normal. No internal jugular chain adenopathy is demonstrated. There is no fluid in the paranasal sinuses/middle ear cavities. Visualized portion of the brain is unremarkable. Orbits are unremarkable. Thoracic inlet unremarkable       1. No evidence of cellulitis or abscess in the submandibular space and floor the mouth 2. Mildly enlarged submandibular lymph nodes, larger on the right, presumably reactive  This report was finalized on 5/6/2022 7:48 PM by Angel Jeffries.              PROCEDURES    Procedures    No orders to display       MEDICATIONS GIVEN IN ER    Medications   dexamethasone (DECADRON) tablet 6 mg (has no  administration in time range)   iopamidol (ISOVUE-300) 61 % injection 100 mL (75 mL Intravenous Given 5/6/22 1936)         PROGRESS, DATA ANALYSIS, CONSULTS, AND MEDICAL DECISION MAKING    All labs have been independently reviewed by me.  All radiology studies have been reviewed by me and the radiologist dictating the report.   EKG's have been independently viewed and interpreted by me.                 Patient remained stable throughout course of stay in the ER.  No evidence of cellulitis or abscess on CT soft tissue neck with IV contrast, but did identify submental lymphadenopathy, probably reactive.  Will discharge to home with clindamycin 300 mg 4 times a day for 7 days, Medrol Dosepak, follow-up with ENT on Monday.  Return if any change or worsening.  He verbalized understanding and is agreeable to plan.      AS OF 20:06 EDT VITALS:    BP - 158/96  HR - 101  TEMP - 98.9 °F (37.2 °C) (Oral)  O2 SATS - 99%                  DIAGNOSIS  Final diagnoses:   Submental lymphadenopathy         DISPOSITION  DISCHARGE    Patient discharged in stable condition.    Reviewed implications of results, diagnosis, meds, responsibility to follow up, warning signs and symptoms of possible worsening, potential complications and reasons to return to ER.    Patient/Family voiced understanding of above instructions.    Discussed plan for discharge, as there is no emergent indication for admission.  Pt/family is agreeable and understands need for follow up and possible repeat testing.  Pt/family is aware that discharge does not mean that nothing is wrong but that it indicates no emergency is currently present that requires admission and they must continue care with follow-up as given below or with a physician of their choice.     FOLLOW-UP  Jose Alfredo Gonzalez MD  [address] [address] [phone]    Schedule an appointment as soon as possible for a visit   Call Monday morning to schedule earliest  available appointment.    Boom Benoit MD  [facility] [address]  [phone]    Go on 5/9/2022  For recheck.         Medication List      New Prescriptions    clindamycin 300 MG capsule  Commonly known as: CLEOCIN  Take 1 capsule by mouth 4 (Four) Times a Day for 7 days.           Where to Get Your Medications      These medications were sent to [facility] [address] [place] [address] - [phone]  - [phone] [place] [address]    Phone: [phone]   · clindamycin 300 MG capsule                  Jad Tran PA-C  05/06/22 2006

## 2022-05-06 NOTE — PROGRESS NOTES
Rahul Walters is a 31 y.o. male who presents today for Hypertension and Headache    You have chosen to receive care through a telemedicine visit. Do you consent to use a telemedicine visit for your medical care today? Yes.    Patient was seen in the ED over the weekend for headache and after a work-up in the ED they felt it was 2/2 elevated blood pressure. Blood pressure in the ED was in the 180s. They increased lisinopril from 5mg to 20mg. Since that time his blood pressure has been running in the 140s-150s at home. He feels like his blood pressure has been elevated due to starting to smoke again. He has not had a headache since he was in the ED. He reports no adverse effects from medications. He tried chantix in the past to quit smoking and it seemed to work well for him. He was on if for about 6 months and quit smoking for a year.        Review of Systems   Constitutional: Negative for fever and unexpected weight loss.   HENT: Negative for congestion, ear pain and sore throat.    Eyes: Negative for visual disturbance.   Respiratory: Negative for cough, shortness of breath and wheezing.    Cardiovascular: Negative for chest pain and palpitations.   Gastrointestinal: Negative for abdominal pain, blood in stool, constipation, diarrhea, nausea, vomiting and GERD.   Endocrine: Negative for polydipsia and polyuria.   Genitourinary: Negative for difficulty urinating.   Musculoskeletal: Negative for joint swelling.   Skin: Negative for rash and skin lesions.   Allergic/Immunologic: Negative for environmental allergies.   Neurological: Negative for dizziness, seizures, syncope and headache.   Hematological: Does not bruise/bleed easily.   Psychiatric/Behavioral: Negative for suicidal ideas.        The following portions of the patient's history were reviewed and updated as appropriate: allergies, current medications, past family history, past medical history, past social history, past surgical history and  problem list.    Current Outpatient Medications on File Prior to Visit   Medication Sig Dispense Refill   • pantoprazole (PROTONIX) 40 MG EC tablet Take 1 tablet by mouth Daily. 90 tablet 0   • [DISCONTINUED] fluticasone (FLONASE) 50 MCG/ACT nasal spray 2 sprays into the nostril(s) as directed by provider Daily. 18.2 mL 0   • [DISCONTINUED] lisinopril (PRINIVIL,ZESTRIL) 20 MG tablet Take 1 tablet by mouth Daily. 30 tablet 0   • [DISCONTINUED] loratadine (Claritin) 10 MG tablet Take 1 tablet by mouth Daily. 30 tablet 0     Current Facility-Administered Medications on File Prior to Visit   Medication Dose Route Frequency Provider Last Rate Last Admin   • ipratropium-albuterol (DUO-NEB) nebulizer solution 3 mL  3 mL Nebulization Once Huong Henry APRN           No Known Allergies     There were no vitals taken for this visit.     Physical Exam  Constitutional:       General: He is not in acute distress.     Appearance: Normal appearance. He is not ill-appearing, toxic-appearing or diaphoretic.   Pulmonary:      Effort: Pulmonary effort is normal. No respiratory distress.   Neurological:      General: No focal deficit present.      Mental Status: He is alert. Mental status is at baseline.   Psychiatric:         Mood and Affect: Mood normal.         Behavior: Behavior normal.          Results for orders placed or performed during the hospital encounter of 04/30/22   COVID-19 and FLU A/B PCR - Swab, Nasopharynx    Specimen: Nasopharynx; Swab   Result Value Ref Range    COVID19 Not Detected Not Detected - Ref. Range    Influenza A PCR Not Detected Not Detected    Influenza B PCR Not Detected Not Detected   Comprehensive Metabolic Panel    Specimen: Blood   Result Value Ref Range    Glucose 126 (H) 65 - 99 mg/dL    BUN 9 6 - 20 mg/dL    Creatinine 0.85 0.76 - 1.27 mg/dL    Sodium 138 136 - 145 mmol/L    Potassium 4.3 3.5 - 5.2 mmol/L    Chloride 101 98 - 107 mmol/L    CO2 23.0 22.0 - 29.0 mmol/L    Calcium 9.6 8.6 -  10.5 mg/dL    Total Protein 7.6 6.0 - 8.5 g/dL    Albumin 4.40 3.50 - 5.20 g/dL    ALT (SGPT) 39 1 - 41 U/L    AST (SGOT) 29 1 - 40 U/L    Alkaline Phosphatase 85 39 - 117 U/L    Total Bilirubin 0.3 0.0 - 1.2 mg/dL    Globulin 3.2 gm/dL    A/G Ratio 1.4 g/dL    BUN/Creatinine Ratio 10.6 7.0 - 25.0    Anion Gap 14.0 5.0 - 15.0 mmol/L    eGFR 119.1 >60.0 mL/min/1.73   TSH    Specimen: Blood   Result Value Ref Range    TSH 1.630 0.270 - 4.200 uIU/mL   Troponin    Specimen: Blood   Result Value Ref Range    Troponin T <0.010 0.000 - 0.030 ng/mL   CBC Auto Differential    Specimen: Blood   Result Value Ref Range    WBC 11.75 (H) 3.40 - 10.80 10*3/mm3    RBC 6.01 (H) 4.14 - 5.80 10*6/mm3    Hemoglobin 17.6 13.0 - 17.7 g/dL    Hematocrit 50.3 37.5 - 51.0 %    MCV 83.7 79.0 - 97.0 fL    MCH 29.3 26.6 - 33.0 pg    MCHC 35.0 31.5 - 35.7 g/dL    RDW 12.6 12.3 - 15.4 %    RDW-SD 37.8 37.0 - 54.0 fl    MPV 11.4 6.0 - 12.0 fL    Platelets 233 140 - 450 10*3/mm3    Neutrophil % 70.6 42.7 - 76.0 %    Lymphocyte % 20.3 19.6 - 45.3 %    Monocyte % 7.1 5.0 - 12.0 %    Eosinophil % 1.3 0.3 - 6.2 %    Basophil % 0.4 0.0 - 1.5 %    Immature Grans % 0.3 0.0 - 0.5 %    Neutrophils, Absolute 8.29 (H) 1.70 - 7.00 10*3/mm3    Lymphocytes, Absolute 2.38 0.70 - 3.10 10*3/mm3    Monocytes, Absolute 0.84 0.10 - 0.90 10*3/mm3    Eosinophils, Absolute 0.15 0.00 - 0.40 10*3/mm3    Basophils, Absolute 0.05 0.00 - 0.20 10*3/mm3    Immature Grans, Absolute 0.04 0.00 - 0.05 10*3/mm3    nRBC 0.0 0.0 - 0.2 /100 WBC   ECG 12 Lead   Result Value Ref Range    QT Interval 310 ms    QTC Interval 406 ms        Problems Addressed this Visit        Cardiac and Vasculature    HTN (hypertension) - Primary     Hypertension is improving with treatment.  Dietary sodium restriction.  Weight loss.  Regular aerobic exercise.  Stop smoking.  Continue current medications.  Ambulatory blood pressure monitoring.  Blood pressure will be reassessed in 4 weeks.            Relevant Medications    lisinopril (PRINIVIL,ZESTRIL) 20 MG tablet       Tobacco    Tobacco dependence     Tobacco use has restarted.  Patient does not wish to complete another course of Chantix at this time.  He is going to try to wean off cigarettes with dip and then wean himself off of dip.             Diagnoses       Codes Comments    Primary hypertension    -  Primary ICD-10-CM: I10  ICD-9-CM: 401.9     Tobacco dependence     ICD-10-CM: F17.200  ICD-9-CM: 305.1           Return in about 4 weeks (around 6/3/2022) for Follow-up HTN.    This was an audio and video enabled telemedicine encounter.    Boom Benoit MD   5/6/2022

## 2022-05-06 NOTE — TELEPHONE ENCOUNTER
That could be angioedema 2/2 lisinopril. He needs to stop taking medication and go to the ED immediately for evaluation as this can progress and cause difficulty breathing.

## 2022-05-06 NOTE — ASSESSMENT & PLAN NOTE
Tobacco use has restarted.  Patient does not wish to complete another course of Chantix at this time.  He is going to try to wean off cigarettes with dip and then wean himself off of dip.

## 2022-05-06 NOTE — TELEPHONE ENCOUNTER
EARNEST CALLED TO SAY HE HAS SWELLING OF HIS THROAT UNDER HIS CHIN AND SWELLING OF HIS LEFT FOOT.  HE WOULD LIKE TO KNOW IF THIS IS RELATED TO THE LISINOPRIL.

## 2022-05-06 NOTE — PATIENT INSTRUCTIONS
"Hypertension, Adult  High blood pressure (hypertension) is when the force of blood pumping through the arteries is too strong. The arteries are the blood vessels that carry blood from the heart throughout the body. Hypertension forces the heart to work harder to pump blood and may cause arteries to become narrow or stiff. Untreated or uncontrolled hypertension can cause a heart attack, heart failure, a stroke, kidney disease, and other problems.  A blood pressure reading consists of a higher number over a lower number. Ideally, your blood pressure should be below 120/80. The first (\"top\") number is called the systolic pressure. It is a measure of the pressure in your arteries as your heart beats. The second (\"bottom\") number is called the diastolic pressure. It is a measure of the pressure in your arteries as the heart relaxes.  What are the causes?  The exact cause of this condition is not known. There are some conditions that result in or are related to high blood pressure.  What increases the risk?  Some risk factors for high blood pressure are under your control. The following factors may make you more likely to develop this condition:  Smoking.  Having type 2 diabetes mellitus, high cholesterol, or both.  Not getting enough exercise or physical activity.  Being overweight.  Having too much fat, sugar, calories, or salt (sodium) in your diet.  Drinking too much alcohol.  Some risk factors for high blood pressure may be difficult or impossible to change. Some of these factors include:  Having chronic kidney disease.  Having a family history of high blood pressure.  Age. Risk increases with age.  Race. You may be at higher risk if you are .  Gender. Men are at higher risk than women before age 45. After age 65, women are at higher risk than men.  Having obstructive sleep apnea.  Stress.  What are the signs or symptoms?  High blood pressure may not cause symptoms. Very high blood pressure " (hypertensive crisis) may cause:  Headache.  Anxiety.  Shortness of breath.  Nosebleed.  Nausea and vomiting.  Vision changes.  Severe chest pain.  Seizures.  How is this diagnosed?  This condition is diagnosed by measuring your blood pressure while you are seated, with your arm resting on a flat surface, your legs uncrossed, and your feet flat on the floor. The cuff of the blood pressure monitor will be placed directly against the skin of your upper arm at the level of your heart. It should be measured at least twice using the same arm. Certain conditions can cause a difference in blood pressure between your right and left arms.  Certain factors can cause blood pressure readings to be lower or higher than normal for a short period of time:  When your blood pressure is higher when you are in a health care provider's office than when you are at home, this is called white coat hypertension. Most people with this condition do not need medicines.  When your blood pressure is higher at home than when you are in a health care provider's office, this is called masked hypertension. Most people with this condition may need medicines to control blood pressure.  If you have a high blood pressure reading during one visit or you have normal blood pressure with other risk factors, you may be asked to:  Return on a different day to have your blood pressure checked again.  Monitor your blood pressure at home for 1 week or longer.  If you are diagnosed with hypertension, you may have other blood or imaging tests to help your health care provider understand your overall risk for other conditions.  How is this treated?  This condition is treated by making healthy lifestyle changes, such as eating healthy foods, exercising more, and reducing your alcohol intake. Your health care provider may prescribe medicine if lifestyle changes are not enough to get your blood pressure under control, and if:  Your systolic blood pressure is above  130.  Your diastolic blood pressure is above 80.  Your personal target blood pressure may vary depending on your medical conditions, your age, and other factors.  Follow these instructions at home:  Eating and drinking    Eat a diet that is high in fiber and potassium, and low in sodium, added sugar, and fat. An example eating plan is called the DASH (Dietary Approaches to Stop Hypertension) diet. To eat this way:  Eat plenty of fresh fruits and vegetables. Try to fill one half of your plate at each meal with fruits and vegetables.  Eat whole grains, such as whole-wheat pasta, brown rice, or whole-grain bread. Fill about one fourth of your plate with whole grains.  Eat or drink low-fat dairy products, such as skim milk or low-fat yogurt.  Avoid fatty cuts of meat, processed or cured meats, and poultry with skin. Fill about one fourth of your plate with lean proteins, such as fish, chicken without skin, beans, eggs, or tofu.  Avoid pre-made and processed foods. These tend to be higher in sodium, added sugar, and fat.  Reduce your daily sodium intake. Most people with hypertension should eat less than 1,500 mg of sodium a day.  Do not drink alcohol if:  Your health care provider tells you not to drink.  You are pregnant, may be pregnant, or are planning to become pregnant.  If you drink alcohol:  Limit how much you use to:  0-1 drink a day for women.  0-2 drinks a day for men.  Be aware of how much alcohol is in your drink. In the [place] one drink equals one 12 oz bottle of beer (355 mL), one 5 oz glass of wine (148 mL), or one 1½ oz glass of hard liquor (44 mL).    Lifestyle    Work with your health care provider to maintain a healthy body weight or to lose weight. Ask what an ideal weight is for you.  Get at least 30 minutes of exercise most days of the week. Activities may include walking, swimming, or biking.  Include exercise to strengthen your muscles (resistance exercise), such as Pilates or lifting weights, as  part of your weekly exercise routine. Try to do these types of exercises for 30 minutes at least 3 days a week.  Do not use any products that contain nicotine or tobacco, such as cigarettes, e-cigarettes, and chewing tobacco. If you need help quitting, ask your health care provider.  Monitor your blood pressure at home as told by your health care provider.  Keep all follow-up visits as told by your health care provider. This is important.    Medicines  Take over-the-counter and prescription medicines only as told by your health care provider. Follow directions carefully. Blood pressure medicines must be taken as prescribed.  Do not skip doses of blood pressure medicine. Doing this puts you at risk for problems and can make the medicine less effective.  Ask your health care provider about side effects or reactions to medicines that you should watch for.  Contact a health care provider if you:  Think you are having a reaction to a medicine you are taking.  Have headaches that keep coming back (recurring).  Feel dizzy.  Have swelling in your ankles.  Have trouble with your vision.  Get help right away if you:  Develop a severe headache or confusion.  Have unusual weakness or numbness.  Feel faint.  Have severe pain in your chest or abdomen.  Vomit repeatedly.  Have trouble breathing.  Summary  Hypertension is when the force of blood pumping through your arteries is too strong. If this condition is not controlled, it may put you at risk for serious complications.  Your personal target blood pressure may vary depending on your medical conditions, your age, and other factors. For most people, a normal blood pressure is less than 120/80.  Hypertension is treated with lifestyle changes, medicines, or a combination of both. Lifestyle changes include losing weight, eating a healthy, low-sodium diet, exercising more, and limiting alcohol.  This information is not intended to replace advice given to you by your health care  "provider. Make sure you discuss any questions you have with your health care provider.  Document Revised: 08/28/2019 Document Reviewed: 08/28/2019  [employer] Patient Education © 2021 [employer]  BMI for Adults  What is BMI?  Body mass index (BMI) is a number that is calculated from a person's weight and height. BMI can help estimate how much of a person's weight is composed of fat. BMI does not measure body fat directly. Rather, it is an alternative to procedures that directly measure body fat, which can be difficult and expensive.  BMI can help identify people who may be at higher risk for certain medical problems.  What are BMI measurements used for?  BMI is used as a screening tool to identify possible weight problems. It helps determine whether a person is obese, overweight, a healthy weight, or underweight.  BMI is useful for:  Identifying a weight problem that may be related to a medical condition or may increase the risk for medical problems.  Promoting changes, such as changes in diet and exercise, to help reach a healthy weight. BMI screening can be repeated to see if these changes are working.  How is BMI calculated?  BMI involves measuring your weight in relation to your height. Both height and weight are measured, and the BMI is calculated from those numbers. This can be done either in English (U[place] or metric measurements. Note that charts and online BMI calculators are available to help you find your BMI quickly and easily without having to do these calculations yourself.  To calculate your BMI in English (U[place] measurements:    Measure your weight in pounds (lb).  Multiply the number of pounds by 703.  For example, for a person who weighs 180 lb, multiply that number by 703, which equals 126,540.  Measure your height in inches. Then multiply that number by itself to get a measurement called \"inches squared.\"  For example, for a person who is 70 inches tall, the \"inches squared\" measurement is 70 " "inches x 70 inches, which equals 4,900 inches squared.  Divide the total from step 2 (number of lb x 703) by the total from step 3 (inches squared): 126,540 ÷ 4,900 = 25.8. This is your BMI.    To calculate your BMI in metric measurements:  Measure your weight in kilograms (kg).  Measure your height in meters (m). Then multiply that number by itself to get a measurement called \"meters squared.\"  For example, for a person who is 1.75 m tall, the \"meters squared\" measurement is 1.75 m x 1.75 m, which is equal to 3.1 meters squared.  Divide the number of kilograms (your weight) by the meters squared number. In this example: 70 ÷ 3.1 = 22.6. This is your BMI.  What do the results mean?  BMI charts are used to identify whether you are underweight, normal weight, overweight, or obese. The following guidelines will be used:  Underweight: BMI less than 18.5.  Normal weight: BMI between 18.5 and 24.9.  Overweight: BMI between 25 and 29.9.  Obese: BMI of 30 or above.  Keep these notes in mind:  Weight includes both fat and muscle, so someone with a muscular build, such as an athlete, may have a BMI that is higher than 24.9. In cases like these, BMI is not an accurate measure of body fat.  To determine if excess body fat is the cause of a BMI of 25 or higher, further assessments may need to be done by a health care provider.  BMI is usually interpreted in the same way for men and women.  Where to find more information  For more information about BMI, including tools to quickly calculate your BMI, go to these websites:  Centers for Disease Control and Prevention: www.cdc.gov  American Heart Association: www.heart.org  National Heart, Lung, and Blood [place]: www.nhlbi.nih.gov  Summary  Body mass index (BMI) is a number that is calculated from a person's weight and height.  BMI may help estimate how much of a person's weight is composed of fat. BMI can help identify those who may be at higher risk for certain medical " problems.  BMI can be measured using English measurements or metric measurements.  BMI charts are used to identify whether you are underweight, normal weight, overweight, or obese.  This information is not intended to replace advice given to you by your health care provider. Make sure you discuss any questions you have with your health care provider.  Document Revised: 09/09/2020 Document Reviewed: 07/17/2020  [employer] Patient Education © 2021 [employer]

## 2022-05-06 NOTE — ASSESSMENT & PLAN NOTE
Hypertension is improving with treatment.  Dietary sodium restriction.  Weight loss.  Regular aerobic exercise.  Stop smoking.  Continue current medications.  Ambulatory blood pressure monitoring.  Blood pressure will be reassessed in 4 weeks.

## 2022-05-06 NOTE — PROGRESS NOTES
You have chosen to receive care through a telehealth visit.  Do you consent to use a video/audio connection for your medical care today? Yes     CHIEF COMPLAINT  No chief complaint on file.        HPI  Rahul Walters is a 31 y.o. male  presents with complaint of feels like throat is swollen, swollen in front of neck, swollen underneath tongue, and left foot is swollen that began today.  Has recently had lisinopril increased from 5 mg to 20 mg due to hypertension.  He denies sore throat, allergy symptoms, or shortness of breath or difficulty swallowing.     Review of Systems   HENT:        Swollen throat and glands   Cardiovascular:        Swollen left foot.       Past Medical History:   Diagnosis Date   • Dental caries    • GERD (gastroesophageal reflux disease)    • Gingivitis    • HTN (hypertension)    • Obesity    • Periodontal disease    • Tobacco dependence        Family History   Problem Relation Age of Onset   • Heart attack Mother    • Coronary artery disease Mother    • Stroke Father    • Hypertension Father        Social History     Socioeconomic History   • Marital status:      Spouse name: Suni   • Number of children: 2   • Years of education: H.S.   • Highest education level: High school graduate   Tobacco Use   • Smoking status: Former Smoker     Packs/day: 1.50     Years: 12.00     Pack years: 18.00     Types: Cigarettes   • Smokeless tobacco: Never Used   Substance and Sexual Activity   • Alcohol use: No   • Drug use: No   • Sexual activity: Yes     Partners: Female     Birth control/protection: Tubal ligation       Rahul Walters  reports that he has quit smoking. His smoking use included cigarettes. He has a 18.00 pack-year smoking history. He has never used smokeless tobacco..               There were no vitals taken for this visit.    PHYSICAL EXAM  Physical Exam   Constitutional: He is oriented to person, place, and time. He appears well-developed and  well-nourished. He does not have a sickly appearance. He does not appear ill.   HENT:   Head: Normocephalic and atraumatic.   Pulmonary/Chest: Effort normal.  No respiratory distress.  Neurological: He is alert and oriented to person, place, and time.         Diagnoses and all orders for this visit:    1. Swelling of throat (Primary)  -     methylPREDNISolone (MEDROL) 4 MG dose pack; Take as directed on package instructions.  Dispense: 21 tablet; Refill: 0    2. Swelling of left foot  -     methylPREDNISolone (MEDROL) 4 MG dose pack; Take as directed on package instructions.  Dispense: 21 tablet; Refill: 0    --take Medrol as prescribed; start benadryl 50 mg every 4 hours  --stop lisinopril until you speak with PCP  --f/u with PCP for further evaluation      FOLLOW-UP  As discussed during visit with PCP/[facility] if no improvement or Urgent Care/Emergency Department if worsening of symptoms    Patient verbalizes understanding of medication dosage, comfort measures, instructions for treatment and follow-up.    Lily Cherry, KRIS  05/06/2022  11:46 EDT    This visit was performed via Telehealth.  This patient has been instructed to follow-up with their primary care provider if their symptoms worsen or the treatment provided does not resolve their illness.

## 2022-05-07 NOTE — DISCHARGE INSTRUCTIONS
Follow-up with Dr. Gonzalez on Monday.  Call to schedule earliest available appointment.  Follow-up with your primary care provider on Monday for recheck.  Return to the emergency department immediately if any change or worsening of symptoms.

## 2022-05-10 ENCOUNTER — TELEPHONE (OUTPATIENT)
Dept: CARDIOLOGY | Facility: HOSPITAL | Age: 32
End: 2022-05-10

## 2022-05-10 NOTE — TELEPHONE ENCOUNTER
Pt was referred to [facility] by the [facility] ER. Several attempts have been made to contact the pt with no success. Letter was mailed to pt to contact the office to schedule.

## 2022-06-06 ENCOUNTER — TELEMEDICINE (OUTPATIENT)
Dept: FAMILY MEDICINE CLINIC | Facility: TELEHEALTH | Age: 32
End: 2022-06-06

## 2022-06-06 DIAGNOSIS — K08.89 PAIN, DENTAL: ICD-10-CM

## 2022-06-06 DIAGNOSIS — J02.9 SORE THROAT: Primary | ICD-10-CM

## 2022-06-06 PROCEDURE — 99213 OFFICE O/P EST LOW 20 MIN: CPT | Performed by: NURSE PRACTITIONER

## 2022-06-06 RX ORDER — AMOXICILLIN AND CLAVULANATE POTASSIUM 875; 125 MG/1; MG/1
1 TABLET, FILM COATED ORAL 2 TIMES DAILY
Qty: 20 TABLET | Refills: 0 | Status: SHIPPED | OUTPATIENT
Start: 2022-06-06 | End: 2022-06-16

## 2022-06-06 NOTE — PROGRESS NOTES
HPI  Rahul Walters is a 31 y.o. male  presents with complaint of left side of throat soreness for the last 2 days. Went to the ER on 5/6 for right side sore throat and neck lymphadenopathy, prescribed clinda x7 days and steroid taper pack. Symptoms went away and returned and he is concerned he has another infection. He was told by ER doctor that the infection was likely related to his teeth. He has an appointment with a dentist next week. He has noticed left side bottom teeth pain intermittently over the last few days as well.     Denies any other symptoms including fever, SOA, CP, runny nose, earache.     Review of Systems    Past Medical History:   Diagnosis Date   • Dental caries    • GERD (gastroesophageal reflux disease)    • Gingivitis    • HTN (hypertension)    • Obesity    • Periodontal disease    • Tobacco dependence        Family History   Problem Relation Age of Onset   • Heart attack Mother    • Coronary artery disease Mother    • Stroke Father    • Hypertension Father        Social History     Socioeconomic History   • Marital status:      Spouse name: Suni   • Number of children: 2   • Years of education: H.S.   • Highest education level: High school graduate   Tobacco Use   • Smoking status: Former Smoker     Packs/day: 1.50     Years: 12.00     Pack years: 18.00     Types: Cigarettes   • Smokeless tobacco: Never Used   Substance and Sexual Activity   • Alcohol use: No   • Drug use: No   • Sexual activity: Yes     Partners: Female     Birth control/protection: Tubal ligation         There were no vitals taken for this visit.    PHYSICAL EXAM  Physical Exam   Constitutional: He appears well-developed and well-nourished.   HENT:   Head: Normocephalic.   Nose: Nose normal.   Neck: Neck normal appearance.  Pulmonary/Chest: Effort normal.   Lymphadenopathy:   Left side neck tender to touch   Neurological: He is alert.   Psychiatric: He has a normal mood and affect. His speech is  normal.       Diagnoses and all orders for this visit:    1. Sore throat (Primary)  -     amoxicillin-clavulanate (Augmentin) 875-125 MG per tablet; Take 1 tablet by mouth 2 (Two) Times a Day for 10 days.  Dispense: 20 tablet; Refill: 0    2. Pain, dental  -     amoxicillin-clavulanate (Augmentin) 875-125 MG per tablet; Take 1 tablet by mouth 2 (Two) Times a Day for 10 days.  Dispense: 20 tablet; Refill: 0      *Keep appt with dentist next week.    FOLLOW-UP  As discussed during visit with [facility], if symptoms worsen or fail to improve, follow-up with PCP/Urgent Care/Emergency Department.    Patient verbalizes understanding of medications, instructions for treatment and follow-up.    KRIS Adames  06/06/2022  11:01 EDT    The use of a video visit has been reviewed with the patient and verbal informed consent has been obtained. Myself and Rahul Walters participated in this visit. The patient is located in [place], [place], and I am located in [place], [place]. MyChart and Zoom were utilized.

## 2022-06-06 NOTE — PATIENT INSTRUCTIONS
Sore Throat  A sore throat is pain, burning, irritation, or scratchiness in the throat. When you have a sore throat, you may feel pain or tenderness in your throat when you swallow or talk.  Many things can cause a sore throat, including:  An infection.  Seasonal allergies.  Dryness in the air.  Irritants, such as smoke or pollution.  Radiation treatment to the area.  Gastroesophageal reflux disease (GERD).  A tumor.  A sore throat is often the first sign of another sickness. It may happen with other symptoms, such as coughing, sneezing, fever, and swollen neck glands. Most sore throats go away without medical treatment.  Follow these instructions at home:         Take over-the-counter medicines only as told by your health care provider.  If your child has a sore throat, do not give your child aspirin because of the association with Reye syndrome.  Drink enough fluids to keep your urine pale yellow.  Rest as needed.  To help with pain, try:  Sipping warm liquids, such as broth, herbal tea, or warm water.  Eating or drinking cold or frozen liquids, such as frozen ice pops.  Gargling with a salt-water mixture 3-4 times a day or as needed. To make a salt-water mixture, completely dissolve ½-1 tsp (3-6 g) of salt in 1 cup (237 mL) of warm water.  Sucking on hard candy or throat lozenges.  Putting a cool-mist humidifier in your bedroom at night to moisten the air.  Sitting in the bathroom with the door closed for 5-10 minutes while you run hot water in the shower.  Do not use any products that contain nicotine or tobacco, such as cigarettes, e-cigarettes, and chewing tobacco. If you need help quitting, ask your health care provider.  Wash your hands well and often with soap and water. If soap and water are not available, use hand .  Contact a health care provider if:  You have a fever for more than 2-3 days.  You have symptoms that last (are persistent) for more than 2-3 days.  Your throat does not get better  within 7 days.  You have a fever and your symptoms suddenly get worse.  Your child who is 3 months to 3 years old has a temperature of 102.2°F (39°C) or higher.  Get help right away if:  You have difficulty breathing.  You cannot swallow fluids, soft foods, or your saliva.  You have increased swelling in your throat or neck.  You have persistent nausea and vomiting.  Summary  A sore throat is pain, burning, irritation, or scratchiness in the throat. Many things can cause a sore throat.  Take over-the-counter medicines only as told by your health care provider. Do not give your child aspirin.  Drink plenty of fluids, and rest as needed.  Contact a health care provider if your symptoms worsen or your sore throat does not get better within 7 days.  This information is not intended to replace advice given to you by your health care provider. Make sure you discuss any questions you have with your health care provider.  Document Revised: 05/20/2019 Document Reviewed: 05/20/2019  [employer] Patient Education © 2021 [employer]

## 2022-06-15 ENCOUNTER — TELEPHONE (OUTPATIENT)
Dept: FAMILY MEDICINE CLINIC | Facility: CLINIC | Age: 32
End: 2022-06-15

## 2022-06-22 ENCOUNTER — TELEMEDICINE (OUTPATIENT)
Dept: FAMILY MEDICINE CLINIC | Facility: TELEHEALTH | Age: 32
End: 2022-06-22

## 2022-06-22 VITALS — TEMPERATURE: 97.9 F | HEIGHT: 71 IN | BODY MASS INDEX: 36.4 KG/M2 | WEIGHT: 260 LBS

## 2022-06-22 DIAGNOSIS — K12.2 UVULITIS: Primary | ICD-10-CM

## 2022-06-22 DIAGNOSIS — K02.9 DENTAL CARIES: ICD-10-CM

## 2022-06-22 PROCEDURE — 99213 OFFICE O/P EST LOW 20 MIN: CPT | Performed by: NURSE PRACTITIONER

## 2022-06-22 RX ORDER — CETIRIZINE HYDROCHLORIDE 10 MG/1
10 TABLET ORAL DAILY
Qty: 30 TABLET | Refills: 0 | Status: SHIPPED | OUTPATIENT
Start: 2022-06-22 | End: 2022-10-17

## 2022-06-22 RX ORDER — DOXYCYCLINE 100 MG/1
100 CAPSULE ORAL 2 TIMES DAILY
Qty: 14 CAPSULE | Refills: 0 | Status: SHIPPED | OUTPATIENT
Start: 2022-06-22 | End: 2022-06-29

## 2022-06-22 RX ORDER — FAMOTIDINE 40 MG/1
TABLET, FILM COATED ORAL
COMMUNITY
Start: 2022-06-06 | End: 2022-07-20 | Stop reason: SDUPTHER

## 2022-06-22 NOTE — PROGRESS NOTES
You have chosen to receive care through a telehealth visit.  Do you consent to use a video/audio connection for your medical care today? Yes     CHIEF COMPLAINT  Cc: uvula swollen, dental problem    HPI  Rahul Walters is a 31 y.o. male  presents with complaint of uvula swelling and dental caries. He reports that he feels like there is something in the back of his throat and he keeps trying to swallow it. His throat hurts intermittently. He has multiple dental caries and is scheduled for surgery 09/2022. He has been on multiple antibiotics since 05/2022 for c/o sore throat and dental problems.    Review of Systems   Constitutional: Negative for fever.   HENT: Positive for dental problem (multiple dental caries) and sore throat.         Uvula swollen   Respiratory: Positive for cough (only from trying to clear throat).    Cardiovascular: Negative for chest pain.   Gastrointestinal: Negative for diarrhea and nausea. Anal bleeding: intermittent.   Musculoskeletal: Negative for myalgias.   Neurological: Negative for headaches.       Past Medical History:   Diagnosis Date   • Dental caries    • GERD (gastroesophageal reflux disease)    • Gingivitis    • HTN (hypertension)    • Obesity    • Periodontal disease    • Tobacco dependence        Family History   Problem Relation Age of Onset   • Heart attack Mother    • Coronary artery disease Mother    • Stroke Father    • Hypertension Father        Social History     Socioeconomic History   • Marital status:      Spouse name: Neelcolleen   • Number of children: 2   • Years of education: H.S.   • Highest education level: High school graduate   Tobacco Use   • Smoking status: Former Smoker     Packs/day: 1.50     Years: 12.00     Pack years: 18.00     Types: Cigarettes   • Smokeless tobacco: Never Used   Substance and Sexual Activity   • Alcohol use: No   • Drug use: No   • Sexual activity: Yes     Partners: Female     Birth control/protection: Tubal ligation  "      Rahul Walters  reports that he has quit smoking. His smoking use included cigarettes. He has a 18.00 pack-year smoking history. He has never used smokeless tobacco..       Temp 97.9 °F (36.6 °C)   Ht 180.3 cm (71\")   Wt 118 kg (260 lb)   BMI 36.26 kg/m²     PHYSICAL EXAM  Physical Exam   Constitutional: He is oriented to person, place, and time. He appears well-developed and well-nourished.   HENT:   Head: Normocephalic and atraumatic.   Right Ear: External ear normal.   Left Ear: External ear normal.   Nose: Nose normal.   Mouth/Throat: Uvula swelling (erythematous) and dental caries present.   Eyes: Lids are normal. Right eye exhibits no discharge and no exudate. Left eye exhibits no discharge and no exudate. Right conjunctiva is not injected. Left conjunctiva is not injected.   Pulmonary/Chest: No accessory muscle usage. No tachypnea and no bradypnea.  No respiratory distress.No use of oxygen by nasal cannulaNo use of oxygen by mask noted.  Abdominal: Abdomen appears normal.   Neurological: He is alert and oriented to person, place, and time. No cranial nerve deficit.   Skin: His skin appears normal.  Psychiatric: He has a normal mood and affect. His speech is normal and behavior is normal. Judgment and thought content normal.       Results for orders placed or performed during the hospital encounter of 05/06/22   CBC Auto Differential    Specimen: Blood   Result Value Ref Range    WBC 8.42 3.40 - 10.80 10*3/mm3    RBC 5.56 4.14 - 5.80 10*6/mm3    Hemoglobin 16.2 13.0 - 17.7 g/dL    Hematocrit 46.7 37.5 - 51.0 %    MCV 84.0 79.0 - 97.0 fL    MCH 29.1 26.6 - 33.0 pg    MCHC 34.7 31.5 - 35.7 g/dL    RDW 12.7 12.3 - 15.4 %    RDW-SD 39.0 37.0 - 54.0 fl    MPV 11.7 6.0 - 12.0 fL    Platelets 218 140 - 450 10*3/mm3    Neutrophil % 62.6 42.7 - 76.0 %    Lymphocyte % 26.7 19.6 - 45.3 %    Monocyte % 9.0 5.0 - 12.0 %    Eosinophil % 1.3 0.3 - 6.2 %    Basophil % 0.2 0.0 - 1.5 %    Immature Grans % 0.2 " 0.0 - 0.5 %    Neutrophils, Absolute 5.26 1.70 - 7.00 10*3/mm3    Lymphocytes, Absolute 2.25 0.70 - 3.10 10*3/mm3    Monocytes, Absolute 0.76 0.10 - 0.90 10*3/mm3    Eosinophils, Absolute 0.11 0.00 - 0.40 10*3/mm3    Basophils, Absolute 0.02 0.00 - 0.20 10*3/mm3    Immature Grans, Absolute 0.02 0.00 - 0.05 10*3/mm3    nRBC 0.0 0.0 - 0.2 /100 WBC   Basic Metabolic Panel    Specimen: Blood   Result Value Ref Range    Glucose 96 65 - 99 mg/dL    BUN 9 6 - 20 mg/dL    Creatinine 0.87 0.76 - 1.27 mg/dL    Sodium 140 136 - 145 mmol/L    Potassium 4.4 3.5 - 5.2 mmol/L    Chloride 105 98 - 107 mmol/L    CO2 24.0 22.0 - 29.0 mmol/L    Calcium 9.6 8.6 - 10.5 mg/dL    BUN/Creatinine Ratio 10.3 7.0 - 25.0    Anion Gap 11.0 5.0 - 15.0 mmol/L    eGFR 118.3 >60.0 mL/min/1.73   POC Creatinine    Specimen: Blood   Result Value Ref Range    Creatinine 0.90 0.60 - 1.30 mg/dL       Diagnoses and all orders for this visit:    1. Uvulitis (Primary)    2. Dental caries    Other orders  -     doxycycline (MONODOX) 100 MG capsule; Take 1 capsule by mouth 2 (Two) Times a Day for 7 days.  Dispense: 14 capsule; Refill: 0  -     cetirizine (zyrTEC) 10 MG tablet; Take 1 tablet by mouth Daily for 30 days.  Dispense: 30 tablet; Refill: 0    Take the doxycycline on an empty stomach with a full glass of water. Do not take minerals or vitamins with minerals with this antibiotic as it decreases the therapeutic value of this antibiotic related to absorption.  Risks and benefits of antibiotic over dose discussed with this patient  Probiotics for two weeks related to taking antibiotics. The pharmacist can help you with this if needed. Do not take within two hours of antibiotic.  Take cetirizine daily  May alternate tylenol and ibuprofen for pain  Hydrate well    FOLLOW-UP  If symptoms worsen or persist follow up with PCP or Urgent Care  Keep appointment for dental extractions in 09/2022    Patient verbalizes understanding of medication dosage, comfort  measures, instructions for treatment and follow-up.    Brittanie Le, APRN  06/22/2022  06:33 EDT    The use of a video visit has been reviewed with the patient and verbal informed consent has been obtained. Myself and Rahul Walters participated in this visit. The patient is located in [address] [place] [address].    I am located in [place], [place]. Mychart and Zoom were utilized. I spent 20 minutes in the patient's chart for this visit.

## 2022-06-30 ENCOUNTER — OFFICE VISIT (OUTPATIENT)
Dept: FAMILY MEDICINE CLINIC | Facility: CLINIC | Age: 32
End: 2022-06-30

## 2022-06-30 VITALS
HEART RATE: 78 BPM | OXYGEN SATURATION: 96 % | DIASTOLIC BLOOD PRESSURE: 82 MMHG | SYSTOLIC BLOOD PRESSURE: 128 MMHG | RESPIRATION RATE: 20 BRPM | WEIGHT: 246.8 LBS | BODY MASS INDEX: 28.56 KG/M2 | TEMPERATURE: 98.2 F | HEIGHT: 78 IN

## 2022-06-30 DIAGNOSIS — K04.7 DENTAL ABSCESS: Primary | ICD-10-CM

## 2022-06-30 DIAGNOSIS — B37.0 THRUSH, ORAL: ICD-10-CM

## 2022-06-30 PROCEDURE — 99213 OFFICE O/P EST LOW 20 MIN: CPT | Performed by: NURSE PRACTITIONER

## 2022-06-30 RX ORDER — PENICILLIN V POTASSIUM 500 MG/1
500 TABLET ORAL 4 TIMES DAILY
Qty: 40 TABLET | Refills: 0 | Status: SHIPPED | OUTPATIENT
Start: 2022-06-30 | End: 2022-07-10

## 2022-06-30 RX ORDER — NICOTINE 21 MG/24HR
PATCH, TRANSDERMAL 24 HOURS TRANSDERMAL
COMMUNITY
Start: 2022-06-07 | End: 2022-10-17

## 2022-06-30 NOTE — ASSESSMENT & PLAN NOTE
Good oral hygiene  Start Penicillin V as directed  Keep appointment upcoming with oral surgeon  RTO or call persistent or worsening symptoms

## 2022-06-30 NOTE — PROGRESS NOTES
"Answers for HPI/ROS submitted by the patient on 6/29/2022  What is the primary reason for your visit?: Other  Please describe your symptoms.: Have been having a lot of dental problems lately have been on and off with antibiotics since may the last antibiotics that I just stopped taking didn't seem to help and I'm pretty sure the infection is spreading and don't know what else to do  Have you had these symptoms before?: No  How long have you been having these symptoms?: Greater than 2 weeks  Please list any medications you are currently taking for this condition.: I was taking  Augmented And I just finished doxy cycling  Please describe any probable cause for these symptoms. : Tooth pain that comes and goes swollen lymph notes Swollen tonsils    Chief Complaint  Dental Problem and Oral Swelling (Patient has been taking antibiotics since April and said he thinks he still has a dental infection)    Subjective        Rahul Walters presents to [facility]  Patient presents to office with complaints of left upper gum/tooth pain and states he has been treated with 2 different antibiotics for dental abscess and has little relief. Admits bad taste at time. Denies fever or chills. He has appointment with oral surgeon in August to have all teeth removed he explains. Reports the last antibiotic he took was doxycycline and finished this yesterday.     Oral Swelling  This is a new problem. The current episode started more than 1 month ago. The problem has been gradually worsening. Pertinent negatives include no abdominal pain, chest pain, chills, congestion, fever or sore throat. The symptoms are aggravated by eating. He has tried rest (antibiotics) for the symptoms. The treatment provided mild relief.       Objective   Vital Signs:  /82 (BP Location: Left arm)   Pulse 78   Temp 98.2 °F (36.8 °C) (Temporal)   Resp 20   Ht 210.8 cm (83\")   Wt 112 kg (246 lb 12.8 oz)   SpO2 " "96%   BMI 25.19 kg/m²   Estimated body mass index is 25.19 kg/m² as calculated from the following:    Height as of this encounter: 210.8 cm (83\").    Weight as of this encounter: 112 kg (246 lb 12.8 oz).          Physical Exam  Vitals and nursing note reviewed.   Constitutional:       General: He is not in acute distress.     Appearance: Normal appearance.   HENT:      Head: Normocephalic.      Right Ear: External ear normal.      Left Ear: External ear normal.      Nose: Nose normal.      Mouth/Throat:      Mouth: Mucous membranes are moist.      Dentition: Dental caries and dental abscesses present.      Pharynx: Oropharyngeal exudate present.        Comments: Tongue with thick white coating  Eyes:      Extraocular Movements: Extraocular movements intact.      Conjunctiva/sclera: Conjunctivae normal.      Pupils: Pupils are equal, round, and reactive to light.   Cardiovascular:      Rate and Rhythm: Normal rate and regular rhythm.      Heart sounds: Normal heart sounds.   Pulmonary:      Effort: Pulmonary effort is normal.      Breath sounds: Normal breath sounds.   Musculoskeletal:      Right lower leg: No edema.      Left lower leg: No edema.   Skin:     General: Skin is warm and dry.   Neurological:      Mental Status: He is alert and oriented to person, place, and time.   Psychiatric:         Mood and Affect: Mood normal.         Behavior: Behavior normal.         Thought Content: Thought content normal.         Judgment: Judgment normal.        Result Review :                Assessment and Plan   Diagnoses and all orders for this visit:    1. Dental abscess (Primary)  Assessment & Plan:  Good oral hygiene  Start Penicillin V as directed  Keep appointment upcoming with oral surgeon  RTO or call persistent or worsening symptoms    Orders:  -     penicillin v potassium (VEETID) 500 MG tablet; Take 1 tablet by mouth 4 (Four) Times a Day for 10 days.  Dispense: 40 tablet; Refill: 0    2. Thrush, oral  Assessment " & Plan:  Start Nystatin swish and swallow as directed      Orders:  -     nystatin (MYCOSTATIN) 100,000 unit/mL suspension; Swish and swallow 5 mL 4 (Four) Times a Day for 14 days.  Dispense: 280 mL; Refill: 0           Follow Up   Return if symptoms worsen or fail to improve, for as scheduled upcoming with PCP.  Patient was given instructions and counseling regarding his condition or for health maintenance advice. Please see specific information pulled into the AVS if appropriate.

## 2022-07-18 ENCOUNTER — TELEMEDICINE (OUTPATIENT)
Dept: FAMILY MEDICINE CLINIC | Facility: TELEHEALTH | Age: 32
End: 2022-07-18

## 2022-07-18 DIAGNOSIS — B37.0 THRUSH: ICD-10-CM

## 2022-07-18 DIAGNOSIS — K04.7 CHRONIC DENTAL INFECTION: Primary | ICD-10-CM

## 2022-07-18 PROCEDURE — 99213 OFFICE O/P EST LOW 20 MIN: CPT | Performed by: NURSE PRACTITIONER

## 2022-07-18 RX ORDER — CLINDAMYCIN HYDROCHLORIDE 300 MG/1
300 CAPSULE ORAL 3 TIMES DAILY
Qty: 30 CAPSULE | Refills: 0 | Status: SHIPPED | OUTPATIENT
Start: 2022-07-18 | End: 2022-07-22 | Stop reason: ALTCHOICE

## 2022-07-18 RX ORDER — FLUCONAZOLE 150 MG/1
TABLET ORAL
Qty: 2 TABLET | Refills: 0 | Status: SHIPPED | OUTPATIENT
Start: 2022-07-18 | End: 2022-10-17

## 2022-07-18 NOTE — PROGRESS NOTES
You have chosen to receive care through a telehealth visit.  Do you consent to use a video/audio connection for your medical care today? Yes     CHIEF COMPLAINT  No chief complaint on file.        HPI  Rahul Walters is a 31 y.o. male  presents with complaint of dental infection in back of left side on top and bottom.  Painful chewing.  Feels like infection has spread to throat which feels swollen.  Denies fever.  Was recently treated for dental infection and thrush on 6/30/22.  Is taking probiotics; has appointment with oral surgeon at end of August.     Review of Systems   HENT: Positive for dental problem and sore throat.    All other systems reviewed and are negative.      Past Medical History:   Diagnosis Date   • Dental caries    • GERD (gastroesophageal reflux disease)    • Gingivitis    • HTN (hypertension)    • Obesity    • Periodontal disease    • Tobacco dependence        Family History   Problem Relation Age of Onset   • Heart attack Mother    • Coronary artery disease Mother    • Stroke Father    • Hypertension Father        Social History     Socioeconomic History   • Marital status:      Spouse name: [facility]   • Number of children: 2   • Years of education: H.S.   • Highest education level: High school graduate   Tobacco Use   • Smoking status: Heavy Tobacco Smoker     Packs/day: 1.00     Years: 12.00     Pack years: 12.00     Types: Cigarettes   • Smokeless tobacco: Never Used   Vaping Use   • Vaping Use: Never used   Substance and Sexual Activity   • Alcohol use: No   • Drug use: No   • Sexual activity: Yes     Partners: Female     Birth control/protection: Tubal ligation       Rahul Walters  reports that he has been smoking cigarettes. He has a 12.00 pack-year smoking history. He has never used smokeless tobacco.. I have educated him on the risk of diseases from using tobacco products such as cancer, COPD and heart disease.     I advised him to quit and he is not willing  to quit.    I spent 4 minutes counseling the patient.              There were no vitals taken for this visit.    PHYSICAL EXAM  Physical Exam   Constitutional: He is oriented to person, place, and time. He appears well-developed and well-nourished. He does not have a sickly appearance. He does not appear ill.   HENT:   Head: Normocephalic and atraumatic.   Pulmonary/Chest: Effort normal.  No respiratory distress.  Neurological: He is alert and oriented to person, place, and time.         Diagnoses and all orders for this visit:    1. Chronic dental infection (Primary)  -     clindamycin (CLEOCIN) 300 MG capsule; Take 1 capsule by mouth 3 (Three) Times a Day for 10 days.  Dispense: 30 capsule; Refill: 0    2. Thrush  -     nystatin (MYCOSTATIN) 100,000 unit/mL suspension; Swish and swallow 5 mL 4 (Four) Times a Day.  Dispense: 473 mL; Refill: 0  -     fluconazole (DIFLUCAN) 150 MG tablet; Take 1 tablet now, repeat in 72 hours if symptoms continue  Dispense: 2 tablet; Refill: 0    --take medications as prescribed  --practice good dental hygiene  --keep scheduled appt with oral surgeon  --f/u in 10 days if no improvement      FOLLOW-UP  As discussed during visit with PCP/[facility] if no improvement or Urgent Care/Emergency Department if worsening of symptoms    Patient verbalizes understanding of medication dosage, comfort measures, instructions for treatment and follow-up.    KRIS Hernandez  07/18/2022  07:51 EDT    The use of a video visit has been reviewed with the patient and verbal informed consent has been obtained. Myself and Rahul Walters participated in this visit. The patient is located in [address] [place] [address].    I am located in [place], [place]. Mychart and Zoom were utilized. I spent 20 minutes in the patient's chart for this visit.

## 2022-07-19 ENCOUNTER — TELEPHONE (OUTPATIENT)
Dept: FAMILY MEDICINE CLINIC | Facility: CLINIC | Age: 32
End: 2022-07-19

## 2022-07-19 DIAGNOSIS — R11.0 NAUSEA: Primary | ICD-10-CM

## 2022-07-19 RX ORDER — ONDANSETRON 8 MG/1
8 TABLET, ORALLY DISINTEGRATING ORAL EVERY 8 HOURS PRN
Qty: 15 TABLET | Refills: 0 | Status: SHIPPED | OUTPATIENT
Start: 2022-07-19 | End: 2022-08-18 | Stop reason: SDUPTHER

## 2022-07-19 NOTE — TELEPHONE ENCOUNTER
Please the patient continue current medication.  If the nausea persists or worsens after 1 to 2 days please have him contact me to change antibiotics.

## 2022-07-19 NOTE — TELEPHONE ENCOUNTER
Caller: RomeoRahul    Relationship: Self    Best call back number: [phone]    What medications are you currently taking:   Current Outpatient Medications on File Prior to Visit   Medication Sig Dispense Refill   • cetirizine (zyrTEC) 10 MG tablet Take 1 tablet by mouth Daily for 30 days. 30 tablet 0   • clindamycin (CLEOCIN) 300 MG capsule Take 1 capsule by mouth 3 (Three) Times a Day for 10 days. 30 capsule 0   • famotidine (PEPCID) 40 MG tablet Patient hasnt started yet, ENT prescribed     • fluconazole (DIFLUCAN) 150 MG tablet Take 1 tablet now, repeat in 72 hours if symptoms continue 2 tablet 0   • lisinopril (PRINIVIL,ZESTRIL) 20 MG tablet Take 1 tablet by mouth Daily. 30 tablet 1   • nicotine (NICODERM CQ) 21 MG/24HR patch Patient hasnt started yet     • nystatin (MYCOSTATIN) 100,000 unit/mL suspension Swish and swallow 5 mL 4 (Four) Times a Day. 473 mL 0   • pantoprazole (PROTONIX) 40 MG EC tablet Take 1 tablet by mouth Daily. 90 tablet 0   • [DISCONTINUED] fluticasone (FLONASE) 50 MCG/ACT nasal spray 2 sprays into the nostril(s) as directed by provider Daily. 18.2 mL 0   • [DISCONTINUED] loratadine (Claritin) 10 MG tablet Take 1 tablet by mouth Daily. 30 tablet 0     Current Facility-Administered Medications on File Prior to Visit   Medication Dose Route Frequency Provider Last Rate Last Admin   • ipratropium-albuterol (DUO-NEB) nebulizer solution 3 mL  3 mL Nebulization Once Huong Henry APRN              When did you start taking these medications: 07.18.22  Which medication are you concerned about: clindamycin  Who prescribed you this medication: DR BOYD FROM THE URGENT CARE  What are your concerns: nausea with or without food,   How long have you had these concerns: SINCE THIS MORNING    PLEASE CALL AND ADVISE ON THIS ISSUE

## 2022-07-20 ENCOUNTER — LAB (OUTPATIENT)
Dept: LAB | Facility: HOSPITAL | Age: 32
End: 2022-07-20

## 2022-07-20 ENCOUNTER — OFFICE VISIT (OUTPATIENT)
Dept: FAMILY MEDICINE CLINIC | Facility: CLINIC | Age: 32
End: 2022-07-20

## 2022-07-20 VITALS
HEIGHT: 71 IN | OXYGEN SATURATION: 98 % | BODY MASS INDEX: 34.86 KG/M2 | RESPIRATION RATE: 16 BRPM | HEART RATE: 98 BPM | TEMPERATURE: 98.9 F | DIASTOLIC BLOOD PRESSURE: 78 MMHG | SYSTOLIC BLOOD PRESSURE: 126 MMHG | WEIGHT: 249 LBS

## 2022-07-20 DIAGNOSIS — F17.200 TOBACCO DEPENDENCE: ICD-10-CM

## 2022-07-20 DIAGNOSIS — Z00.00 WELL ADULT EXAM: Primary | ICD-10-CM

## 2022-07-20 DIAGNOSIS — I10 PRIMARY HYPERTENSION: ICD-10-CM

## 2022-07-20 DIAGNOSIS — K21.9 GASTROESOPHAGEAL REFLUX DISEASE, UNSPECIFIED WHETHER ESOPHAGITIS PRESENT: ICD-10-CM

## 2022-07-20 LAB
ALBUMIN SERPL-MCNC: 4.9 G/DL (ref 3.5–5.2)
ALBUMIN/GLOB SERPL: 1.6 G/DL
ALP SERPL-CCNC: 81 U/L (ref 39–117)
ALT SERPL W P-5'-P-CCNC: 26 U/L (ref 1–41)
ANION GAP SERPL CALCULATED.3IONS-SCNC: 13 MMOL/L (ref 5–15)
AST SERPL-CCNC: 18 U/L (ref 1–40)
BASOPHILS # BLD AUTO: 0.05 10*3/MM3 (ref 0–0.2)
BASOPHILS NFR BLD AUTO: 0.5 % (ref 0–1.5)
BILIRUB SERPL-MCNC: 0.3 MG/DL (ref 0–1.2)
BUN SERPL-MCNC: 9 MG/DL (ref 6–20)
BUN/CREAT SERPL: 12.2 (ref 7–25)
CALCIUM SPEC-SCNC: 9.9 MG/DL (ref 8.6–10.5)
CHLORIDE SERPL-SCNC: 103 MMOL/L (ref 98–107)
CHOLEST SERPL-MCNC: 160 MG/DL (ref 0–200)
CO2 SERPL-SCNC: 25 MMOL/L (ref 22–29)
CREAT SERPL-MCNC: 0.74 MG/DL (ref 0.76–1.27)
DEPRECATED RDW RBC AUTO: 43.2 FL (ref 37–54)
EGFRCR SERPLBLD CKD-EPI 2021: 124.2 ML/MIN/1.73
EOSINOPHIL # BLD AUTO: 0.15 10*3/MM3 (ref 0–0.4)
EOSINOPHIL NFR BLD AUTO: 1.5 % (ref 0.3–6.2)
ERYTHROCYTE [DISTWIDTH] IN BLOOD BY AUTOMATED COUNT: 13.5 % (ref 12.3–15.4)
GLOBULIN UR ELPH-MCNC: 3 GM/DL
GLUCOSE SERPL-MCNC: 73 MG/DL (ref 65–99)
HBA1C MFR BLD: 5.6 % (ref 4.8–5.6)
HCT VFR BLD AUTO: 51.9 % (ref 37.5–51)
HCV AB SER DONR QL: NORMAL
HDLC SERPL-MCNC: 26 MG/DL (ref 40–60)
HGB BLD-MCNC: 17.1 G/DL (ref 13–17.7)
IMM GRANULOCYTES # BLD AUTO: 0.04 10*3/MM3 (ref 0–0.05)
IMM GRANULOCYTES NFR BLD AUTO: 0.4 % (ref 0–0.5)
LDLC SERPL CALC-MCNC: 100 MG/DL (ref 0–100)
LDLC/HDLC SERPL: 3.67 {RATIO}
LYMPHOCYTES # BLD AUTO: 2.59 10*3/MM3 (ref 0.7–3.1)
LYMPHOCYTES NFR BLD AUTO: 25.2 % (ref 19.6–45.3)
MCH RBC QN AUTO: 29.3 PG (ref 26.6–33)
MCHC RBC AUTO-ENTMCNC: 32.9 G/DL (ref 31.5–35.7)
MCV RBC AUTO: 89 FL (ref 79–97)
MONOCYTES # BLD AUTO: 0.91 10*3/MM3 (ref 0.1–0.9)
MONOCYTES NFR BLD AUTO: 8.9 % (ref 5–12)
NEUTROPHILS NFR BLD AUTO: 6.54 10*3/MM3 (ref 1.7–7)
NEUTROPHILS NFR BLD AUTO: 63.5 % (ref 42.7–76)
NRBC BLD AUTO-RTO: 0 /100 WBC (ref 0–0.2)
PLATELET # BLD AUTO: 261 10*3/MM3 (ref 140–450)
PMV BLD AUTO: 12 FL (ref 6–12)
POTASSIUM SERPL-SCNC: 4.3 MMOL/L (ref 3.5–5.2)
PROT SERPL-MCNC: 7.9 G/DL (ref 6–8.5)
RBC # BLD AUTO: 5.83 10*6/MM3 (ref 4.14–5.8)
SODIUM SERPL-SCNC: 141 MMOL/L (ref 136–145)
TRIGL SERPL-MCNC: 193 MG/DL (ref 0–150)
VLDLC SERPL-MCNC: 34 MG/DL (ref 5–40)
WBC NRBC COR # BLD: 10.28 10*3/MM3 (ref 3.4–10.8)

## 2022-07-20 PROCEDURE — 99406 BEHAV CHNG SMOKING 3-10 MIN: CPT | Performed by: FAMILY MEDICINE

## 2022-07-20 PROCEDURE — 80061 LIPID PANEL: CPT | Performed by: FAMILY MEDICINE

## 2022-07-20 PROCEDURE — 2014F MENTAL STATUS ASSESS: CPT | Performed by: FAMILY MEDICINE

## 2022-07-20 PROCEDURE — 3008F BODY MASS INDEX DOCD: CPT | Performed by: FAMILY MEDICINE

## 2022-07-20 PROCEDURE — 99213 OFFICE O/P EST LOW 20 MIN: CPT | Performed by: FAMILY MEDICINE

## 2022-07-20 PROCEDURE — 85025 COMPLETE CBC W/AUTO DIFF WBC: CPT | Performed by: FAMILY MEDICINE

## 2022-07-20 PROCEDURE — 80053 COMPREHEN METABOLIC PANEL: CPT | Performed by: FAMILY MEDICINE

## 2022-07-20 PROCEDURE — 86803 HEPATITIS C AB TEST: CPT | Performed by: FAMILY MEDICINE

## 2022-07-20 PROCEDURE — 99395 PREV VISIT EST AGE 18-39: CPT | Performed by: FAMILY MEDICINE

## 2022-07-20 PROCEDURE — 83036 HEMOGLOBIN GLYCOSYLATED A1C: CPT | Performed by: FAMILY MEDICINE

## 2022-07-20 RX ORDER — VARENICLINE TARTRATE 1 MG/1
1 TABLET, FILM COATED ORAL 2 TIMES DAILY
Qty: 56 TABLET | Refills: 1 | Status: SHIPPED | OUTPATIENT
Start: 2022-08-17 | End: 2022-10-12

## 2022-07-20 RX ORDER — FAMOTIDINE 20 MG/1
20 TABLET, FILM COATED ORAL 2 TIMES DAILY
Qty: 60 TABLET | Refills: 3 | Status: SHIPPED | OUTPATIENT
Start: 2022-07-20 | End: 2022-10-17

## 2022-07-20 RX ORDER — LISINOPRIL 20 MG/1
20 TABLET ORAL DAILY
Qty: 90 TABLET | Refills: 3 | Status: SHIPPED | OUTPATIENT
Start: 2022-07-20 | End: 2022-10-12 | Stop reason: SDUPTHER

## 2022-07-20 NOTE — ASSESSMENT & PLAN NOTE
Chronic and stable.  Patient was told to discontinue pantoprazole and start taking famotidine.  Patient has not started famotidine but ran out of pantoprazole 3 days ago.  He has had occasional mild heartburn since stopping pantoprazole.  Patient was sent in a prescription for famotidine and will begin this.  If patient does not get relief of symptoms will refer to GI for EGD as he has not had one of these performed and has had GERD for several years.

## 2022-07-20 NOTE — ASSESSMENT & PLAN NOTE
Rahul Walters  reports that he has been smoking cigarettes. He has a 12.00 pack-year smoking history. He has never used smokeless tobacco.. I have educated him on the risk of diseases from using tobacco products such as cancer, COPD, heart disease and cataracts.     I advised him to quit and he is willing to quit. We have discussed the following method/s for tobacco cessation:  Education Material Counseling OTC Cessation Products Prescription Medicaiton.  Together we have set a quit date for 1 month from today.  He will follow up with me in 3 month or sooner to check on his progress.    I spent 5.5 minutes counseling the patient.

## 2022-07-20 NOTE — PROGRESS NOTES
Rahul Walters is a 31 y.o. male who presents today to complete annual exam.    Chief Complaint   Patient presents with   • Annual Exam        Patient is here for annual today. He has been on multiple rounds of antibiotics for tooth infection lately. He was seen yesterday at [facility] and was given antibiotic and then found thrush and started him on nystatin swish and swallow. He is concerned with all the antibiotics that he could get C. Diff. He has had some nausea with new antibiotic which zofran has helped. He is taking a probiotic. He has upcoming appointment with oral surgery on August 24th. Patient tries to exercise by walking at least 3 days week. He does not follow a specific diet but is trying to limit sodium intake due to high blood pressure. He has been seeing dentist lately. He is planning to see optometrist once dental work is complete. He has not been seen by dermatology. Patient has been smoking more lately and would like to try chantix to help him quit smoking.  He has tried nicotine patches in the past but he was allergic to each when he tried.       Review of Systems   Constitutional: Negative for fever and unexpected weight loss.   HENT: Negative for congestion, ear pain and sore throat.    Eyes: Negative for visual disturbance.   Respiratory: Negative for cough, shortness of breath and wheezing.    Cardiovascular: Negative for chest pain and palpitations.   Gastrointestinal: Negative for abdominal pain, blood in stool, constipation, diarrhea, nausea, vomiting and GERD.   Endocrine: Negative for polydipsia and polyuria.   Genitourinary: Negative for difficulty urinating.   Musculoskeletal: Negative for joint swelling.   Skin: Negative for rash and skin lesions.   Allergic/Immunologic: Negative for environmental allergies.   Neurological: Negative for seizures and syncope.   Hematological: Does not bruise/bleed easily.   Psychiatric/Behavioral: Negative for suicidal ideas.        PHQ-2/PHQ-9  Depression Screening 6/30/2022   Retired Total Score -   Little Interest or Pleasure in Doing Things 0-->not at all   Feeling Down, Depressed or Hopeless 0-->not at all   PHQ-9: Brief Depression Severity Measure Score 0         Past Medical History:   Diagnosis Date   • Dental caries    • GERD (gastroesophageal reflux disease)    • Gingivitis    • HTN (hypertension)    • Obesity    • Periodontal disease    • Tobacco dependence         Past Surgical History:   Procedure Laterality Date   • WISDOM TOOTH EXTRACTION  2017        Family History   Problem Relation Age of Onset   • Heart attack Mother    • Coronary artery disease Mother    • Stroke Father    • Hypertension Father    • No Known Problems Sister    • No Known Problems Brother    • Cancer Maternal Grandmother    • Heart disease Maternal Grandfather    • Hypertension Paternal Grandmother    • Heart attack Paternal Grandmother    • Coronary artery disease Paternal Grandmother    • Alcohol abuse Paternal Grandfather    • No Known Problems Daughter    • No Known Problems Son         Social History     Socioeconomic History   • Marital status:      Spouse name: Suni   • Number of children: 2   • Years of education: H.S.   • Highest education level: High school graduate   Tobacco Use   • Smoking status: Heavy Tobacco Smoker     Packs/day: 1.00     Years: 12.00     Pack years: 12.00     Types: Cigarettes   • Smokeless tobacco: Never Used   Vaping Use   • Vaping Use: Never used   Substance and Sexual Activity   • Alcohol use: No   • Drug use: No   • Sexual activity: Yes     Partners: Female     Birth control/protection: Tubal ligation        Current Outpatient Medications on File Prior to Visit   Medication Sig Dispense Refill   • cetirizine (zyrTEC) 10 MG tablet Take 1 tablet by mouth Daily for 30 days. 30 tablet 0   • clindamycin (CLEOCIN) 300 MG capsule Take 1 capsule by mouth 3 (Three) Times a Day for 10 days. 30 capsule 0   • fluconazole (DIFLUCAN) 150  "MG tablet Take 1 tablet now, repeat in 72 hours if symptoms continue 2 tablet 0   • nicotine (NICODERM CQ) 21 MG/24HR patch Patient hasnt started yet     • nystatin (MYCOSTATIN) 100,000 unit/mL suspension Swish and swallow 5 mL 4 (Four) Times a Day. 473 mL 0   • ondansetron ODT (Zofran ODT) 8 MG disintegrating tablet Place 1 tablet on the tongue Every 8 (Eight) Hours As Needed for Nausea or Vomiting. 15 tablet 0   • [DISCONTINUED] famotidine (PEPCID) 40 MG tablet Patient hasnt started yet, ENT prescribed     • [DISCONTINUED] lisinopril (PRINIVIL,ZESTRIL) 20 MG tablet Take 1 tablet by mouth Daily. 30 tablet 1   • [DISCONTINUED] pantoprazole (PROTONIX) 40 MG EC tablet Take 1 tablet by mouth Daily. 90 tablet 0   • [DISCONTINUED] fluticasone (FLONASE) 50 MCG/ACT nasal spray 2 sprays into the nostril(s) as directed by provider Daily. 18.2 mL 0   • [DISCONTINUED] loratadine (Claritin) 10 MG tablet Take 1 tablet by mouth Daily. 30 tablet 0     Current Facility-Administered Medications on File Prior to Visit   Medication Dose Route Frequency Provider Last Rate Last Admin   • ipratropium-albuterol (DUO-NEB) nebulizer solution 3 mL  3 mL Nebulization Once Huong Henry APRN           No Known Allergies     Visit Vitals  /78 (BP Location: Left arm, Patient Position: Sitting, Cuff Size: Large Adult)   Pulse 98   Temp 98.9 °F (37.2 °C)   Resp 16   Ht 180.3 cm (71\")   Wt 113 kg (249 lb)   SpO2 98%   BMI 34.73 kg/m²      Body mass index is 34.73 kg/m².    Physical Exam  Constitutional:       General: He is not in acute distress.     Appearance: He is well-developed. He is not diaphoretic.   HENT:      Head: Atraumatic.      Mouth/Throat:      Mouth: Mucous membranes are moist.      Dentition: Abnormal dentition. Dental caries present.      Tongue: Lesions (white colored patches) present.      Palate: No mass.      Pharynx: No oropharyngeal exudate or posterior oropharyngeal erythema.      Tonsils: No tonsillar exudate. "   Cardiovascular:      Rate and Rhythm: Normal rate and regular rhythm.      Heart sounds: Normal heart sounds. No murmur heard.    No friction rub. No gallop.   Pulmonary:      Effort: Pulmonary effort is normal. No respiratory distress.      Breath sounds: Normal breath sounds. No stridor. No wheezing, rhonchi or rales.   Abdominal:      General: Bowel sounds are normal. There is no distension.      Palpations: Abdomen is soft. There is no mass.      Tenderness: There is no abdominal tenderness. There is no guarding or rebound.      Hernia: No hernia is present.   Musculoskeletal:      Cervical back: Normal range of motion and neck supple.   Skin:     General: Skin is warm and dry.   Neurological:      Mental Status: He is alert and oriented to person, place, and time.   Psychiatric:         Behavior: Behavior normal.               Problems Addressed this Visit        Cardiac and Vasculature    HTN (hypertension)     Hypertension is improving with treatment.  Continue current treatment regimen.  Blood pressure will be reassessed in 3 months.           Relevant Medications    lisinopril (PRINIVIL,ZESTRIL) 20 MG tablet    Other Relevant Orders    CBC & Differential    Comprehensive Metabolic Panel    Hemoglobin A1c    Lipid Panel       Gastrointestinal Abdominal     GERD (gastroesophageal reflux disease)     Chronic and stable.  Patient was told to discontinue pantoprazole and start taking famotidine.  Patient has not started famotidine but ran out of pantoprazole 3 days ago.  He has had occasional mild heartburn since stopping pantoprazole.  Patient was sent in a prescription for famotidine and will begin this.  If patient does not get relief of symptoms will refer to GI for EGD as he has not had one of these performed and has had GERD for several years.           Relevant Medications    famotidine (PEPCID) 20 MG tablet       Health Encounters    Well adult exam - Primary     The patient is here for health  maintenance visit.  Currently, the patient consumes a unhealthy diet and has an inadequate exercise regimen.  Screening lab work is ordered.  Immunizations were reviewed today.  Advice and education was given regarding nutrition, aerobic exercise, routine dental evaluations, routine eye exams, reproductive health, cardiovascular risk reduction, sunscreen use, self skin examination (annual dermatology evaluations) and seatbelt use (general overall safety).  Further recommendations will be given if needed after lab evaluation.  Annual wellness evaluation is recommended.             Relevant Orders    CBC & Differential    Comprehensive Metabolic Panel    Hemoglobin A1c    Lipid Panel    Hepatitis C Antibody       Tobacco    Tobacco dependence     Rahul Walters  reports that he has been smoking cigarettes. He has a 12.00 pack-year smoking history. He has never used smokeless tobacco.. I have educated him on the risk of diseases from using tobacco products such as cancer, COPD, heart disease and cataracts.     I advised him to quit and he is willing to quit. We have discussed the following method/s for tobacco cessation:  Education Material Counseling OTC Cessation Products Prescription Medicaiton.  Together we have set a quit date for 1 month from today.  He will follow up with me in 3 month or sooner to check on his progress.    I spent 5.5 minutes counseling the patient.                  Relevant Medications    varenicline (CHANTIX RASHID) 0.5 MG X 11 & 1 MG X 42 tablet    varenicline (Chantix Continuing Month Rashid) 1 MG tablet (Start on 8/17/2022)      Diagnoses       Codes Comments    Well adult exam    -  Primary ICD-10-CM: Z00.00  ICD-9-CM: V70.0     Primary hypertension     ICD-10-CM: I10  ICD-9-CM: 401.9     Tobacco dependence     ICD-10-CM: F17.200  ICD-9-CM: 305.1     Gastroesophageal reflux disease, unspecified whether esophagitis present     ICD-10-CM: K21.9  ICD-9-CM: 530.81           Return in  about 3 months (around 10/20/2022) for Follow-up HTN, GERD, smoking.    Boom Benoit MD  7/20/2022

## 2022-07-20 NOTE — PATIENT INSTRUCTIONS
"Hypertension, Adult  High blood pressure (hypertension) is when the force of blood pumping through the arteries is too strong. The arteries are the blood vessels that carry blood from the heart throughout the body. Hypertension forces the heart to work harder to pump blood and may cause arteries to become narrow or stiff. Untreated or uncontrolled hypertension can cause a heart attack, heart failure, a stroke, kidney disease, and other problems.  A blood pressure reading consists of a higher number over a lower number. Ideally, your blood pressure should be below 120/80. The first (\"top\") number is called the systolic pressure. It is a measure of the pressure in your arteries as your heart beats. The second (\"bottom\") number is called the diastolic pressure. It is a measure of the pressure in your arteries as the heart relaxes.  What are the causes?  The exact cause of this condition is not known. There are some conditions that result in or are related to high blood pressure.  What increases the risk?  Some risk factors for high blood pressure are under your control. The following factors may make you more likely to develop this condition:  Smoking.  Having type 2 diabetes mellitus, high cholesterol, or both.  Not getting enough exercise or physical activity.  Being overweight.  Having too much fat, sugar, calories, or salt (sodium) in your diet.  Drinking too much alcohol.  Some risk factors for high blood pressure may be difficult or impossible to change. Some of these factors include:  Having chronic kidney disease.  Having a family history of high blood pressure.  Age. Risk increases with age.  Race. You may be at higher risk if you are .  Gender. Men are at higher risk than women before age 45. After age 65, women are at higher risk than men.  Having obstructive sleep apnea.  Stress.  What are the signs or symptoms?  High blood pressure may not cause symptoms. Very high blood pressure " (hypertensive crisis) may cause:  Headache.  Anxiety.  Shortness of breath.  Nosebleed.  Nausea and vomiting.  Vision changes.  Severe chest pain.  Seizures.  How is this diagnosed?  This condition is diagnosed by measuring your blood pressure while you are seated, with your arm resting on a flat surface, your legs uncrossed, and your feet flat on the floor. The cuff of the blood pressure monitor will be placed directly against the skin of your upper arm at the level of your heart. It should be measured at least twice using the same arm. Certain conditions can cause a difference in blood pressure between your right and left arms.  Certain factors can cause blood pressure readings to be lower or higher than normal for a short period of time:  When your blood pressure is higher when you are in a health care provider's office than when you are at home, this is called white coat hypertension. Most people with this condition do not need medicines.  When your blood pressure is higher at home than when you are in a health care provider's office, this is called masked hypertension. Most people with this condition may need medicines to control blood pressure.  If you have a high blood pressure reading during one visit or you have normal blood pressure with other risk factors, you may be asked to:  Return on a different day to have your blood pressure checked again.  Monitor your blood pressure at home for 1 week or longer.  If you are diagnosed with hypertension, you may have other blood or imaging tests to help your health care provider understand your overall risk for other conditions.  How is this treated?  This condition is treated by making healthy lifestyle changes, such as eating healthy foods, exercising more, and reducing your alcohol intake. Your health care provider may prescribe medicine if lifestyle changes are not enough to get your blood pressure under control, and if:  Your systolic blood pressure is above  130.  Your diastolic blood pressure is above 80.  Your personal target blood pressure may vary depending on your medical conditions, your age, and other factors.  Follow these instructions at home:  Eating and drinking    Eat a diet that is high in fiber and potassium, and low in sodium, added sugar, and fat. An example eating plan is called the DASH (Dietary Approaches to Stop Hypertension) diet. To eat this way:  Eat plenty of fresh fruits and vegetables. Try to fill one half of your plate at each meal with fruits and vegetables.  Eat whole grains, such as whole-wheat pasta, brown rice, or whole-grain bread. Fill about one fourth of your plate with whole grains.  Eat or drink low-fat dairy products, such as skim milk or low-fat yogurt.  Avoid fatty cuts of meat, processed or cured meats, and poultry with skin. Fill about one fourth of your plate with lean proteins, such as fish, chicken without skin, beans, eggs, or tofu.  Avoid pre-made and processed foods. These tend to be higher in sodium, added sugar, and fat.  Reduce your daily sodium intake. Most people with hypertension should eat less than 1,500 mg of sodium a day.  Do not drink alcohol if:  Your health care provider tells you not to drink.  You are pregnant, may be pregnant, or are planning to become pregnant.  If you drink alcohol:  Limit how much you use to:  0-1 drink a day for women.  0-2 drinks a day for men.  Be aware of how much alcohol is in your drink. In the [place] one drink equals one 12 oz bottle of beer (355 mL), one 5 oz glass of wine (148 mL), or one 1½ oz glass of hard liquor (44 mL).    Lifestyle    Work with your health care provider to maintain a healthy body weight or to lose weight. Ask what an ideal weight is for you.  Get at least 30 minutes of exercise most days of the week. Activities may include walking, swimming, or biking.  Include exercise to strengthen your muscles (resistance exercise), such as Pilates or lifting weights, as  part of your weekly exercise routine. Try to do these types of exercises for 30 minutes at least 3 days a week.  Do not use any products that contain nicotine or tobacco, such as cigarettes, e-cigarettes, and chewing tobacco. If you need help quitting, ask your health care provider.  Monitor your blood pressure at home as told by your health care provider.  Keep all follow-up visits as told by your health care provider. This is important.    Medicines  Take over-the-counter and prescription medicines only as told by your health care provider. Follow directions carefully. Blood pressure medicines must be taken as prescribed.  Do not skip doses of blood pressure medicine. Doing this puts you at risk for problems and can make the medicine less effective.  Ask your health care provider about side effects or reactions to medicines that you should watch for.  Contact a health care provider if you:  Think you are having a reaction to a medicine you are taking.  Have headaches that keep coming back (recurring).  Feel dizzy.  Have swelling in your ankles.  Have trouble with your vision.  Get help right away if you:  Develop a severe headache or confusion.  Have unusual weakness or numbness.  Feel faint.  Have severe pain in your chest or abdomen.  Vomit repeatedly.  Have trouble breathing.  Summary  Hypertension is when the force of blood pumping through your arteries is too strong. If this condition is not controlled, it may put you at risk for serious complications.  Your personal target blood pressure may vary depending on your medical conditions, your age, and other factors. For most people, a normal blood pressure is less than 120/80.  Hypertension is treated with lifestyle changes, medicines, or a combination of both. Lifestyle changes include losing weight, eating a healthy, low-sodium diet, exercising more, and limiting alcohol.  This information is not intended to replace advice given to you by your health care  "provider. Make sure you discuss any questions you have with your health care provider.  Document Revised: 08/28/2019 Document Reviewed: 08/28/2019  [employer] Patient Education © 2021 [employer]  BMI for Adults  What is BMI?  Body mass index (BMI) is a number that is calculated from a person's weight and height. BMI can help estimate how much of a person's weight is composed of fat. BMI does not measure body fat directly. Rather, it is an alternative to procedures that directly measure body fat, which can be difficult and expensive.  BMI can help identify people who may be at higher risk for certain medical problems.  What are BMI measurements used for?  BMI is used as a screening tool to identify possible weight problems. It helps determine whether a person is obese, overweight, a healthy weight, or underweight.  BMI is useful for:  Identifying a weight problem that may be related to a medical condition or may increase the risk for medical problems.  Promoting changes, such as changes in diet and exercise, to help reach a healthy weight. BMI screening can be repeated to see if these changes are working.  How is BMI calculated?  BMI involves measuring your weight in relation to your height. Both height and weight are measured, and the BMI is calculated from those numbers. This can be done either in English ([place] or metric measurements. Note that charts and online BMI calculators are available to help you find your BMI quickly and easily without having to do these calculations yourself.  To calculate your BMI in English ([place] measurements:    Measure your weight in pounds (lb).  Multiply the number of pounds by 703.  For example, for a person who weighs 180 lb, multiply that number by 703, which equals 126,540.  Measure your height in inches. Then multiply that number by itself to get a measurement called \"inches squared.\"  For example, for a person who is 70 inches tall, the \"inches squared\" measurement is 70 " "inches x 70 inches, which equals 4,900 inches squared.  Divide the total from step 2 (number of lb x 703) by the total from step 3 (inches squared): 126,540 ÷ 4,900 = 25.8. This is your BMI.    To calculate your BMI in metric measurements:  Measure your weight in kilograms (kg).  Measure your height in meters (m). Then multiply that number by itself to get a measurement called \"meters squared.\"  For example, for a person who is 1.75 m tall, the \"meters squared\" measurement is 1.75 m x 1.75 m, which is equal to 3.1 meters squared.  Divide the number of kilograms (your weight) by the meters squared number. In this example: 70 ÷ 3.1 = 22.6. This is your BMI.  What do the results mean?  BMI charts are used to identify whether you are underweight, normal weight, overweight, or obese. The following guidelines will be used:  Underweight: BMI less than 18.5.  Normal weight: BMI between 18.5 and 24.9.  Overweight: BMI between 25 and 29.9.  Obese: BMI of 30 or above.  Keep these notes in mind:  Weight includes both fat and muscle, so someone with a muscular build, such as an athlete, may have a BMI that is higher than 24.9. In cases like these, BMI is not an accurate measure of body fat.  To determine if excess body fat is the cause of a BMI of 25 or higher, further assessments may need to be done by a health care provider.  BMI is usually interpreted in the same way for men and women.  Where to find more information  For more information about BMI, including tools to quickly calculate your BMI, go to these websites:  Centers for Disease Control and Prevention: www.cdc.gov  American Heart Association: www.heart.org  National Heart, Lung, and Blood [place]: www.nhlbi.nih.gov  Summary  Body mass index (BMI) is a number that is calculated from a person's weight and height.  BMI may help estimate how much of a person's weight is composed of fat. BMI can help identify those who may be at higher risk for certain medical " problems.  BMI can be measured using English measurements or metric measurements.  BMI charts are used to identify whether you are underweight, normal weight, overweight, or obese.  This information is not intended to replace advice given to you by your health care provider. Make sure you discuss any questions you have with your health care provider.  Document Revised: 09/09/2020 Document Reviewed: 07/17/2020  [employer] Patient Education © 2021 [employer]  Smoking Tobacco Information, Adult  Smoking tobacco can be harmful to your health. Tobacco contains a poisonous (toxic), colorless chemical called nicotine. Nicotine is addictive. It changes the brain and can make it hard to stop smoking. Tobacco also has other toxic chemicals that can hurt your body and raise your risk of many cancers.  How can smoking tobacco affect me?  Smoking tobacco puts you at risk for:  Cancer. Smoking is most commonly associated with lung cancer, but can also lead to cancer in other parts of the body.  Chronic obstructive pulmonary disease (COPD). This is a long-term lung condition that makes it hard to breathe. It also gets worse over time.  High blood pressure (hypertension), heart disease, stroke, or heart attack.  Lung infections, such as pneumonia.  Cataracts. This is when the lenses in the eyes become clouded.  Digestive problems. This may include peptic ulcers, heartburn, and gastroesophageal reflux disease (GERD).  Oral health problems, such as gum disease and tooth loss.  Loss of taste and smell.  Smoking can affect your appearance by causing:  Wrinkles.  Yellow or stained teeth, fingers, and fingernails.  Smoking tobacco can also affect your social life, because:  It may be challenging to find places to smoke when away from home. Many workplaces, restaurants, hotels, and public places are tobacco-free.  Smoking is expensive. This is due to the cost of tobacco and the long-term costs of treating health problems from  smoking.  Secondhand smoke may affect those around you. Secondhand smoke can cause lung cancer, breathing problems, and heart disease. Children of smokers have a higher risk for:  Sudden infant death syndrome (SIDS).  Ear infections.  Lung infections.  If you currently smoke tobacco, quitting now can help you:  Lead a longer and healthier life.  Look, smell, breathe, and feel better over time.  Save money.  Protect others from the harms of secondhand smoke.  What actions can I take to prevent health problems?  Quit smoking    Do not start smoking. Quit if you already do.  Make a plan to quit smoking and commit to it. Look for programs to help you and ask your health care provider for recommendations and ideas.  Set a date and write down all the reasons you want to quit.  Let your friends and family know you are quitting so they can help and support you. Consider finding friends who also want to quit. It can be easier to quit with someone else, so that you can support each other.  Talk with your health care provider about using nicotine replacement medicines to help you quit, such as gum, lozenges, patches, sprays, or pills.  Do not replace cigarette smoking with electronic cigarettes, which are commonly called e-cigarettes. The safety of e-cigarettes is not known, and some may contain harmful chemicals.  If you try to quit but return to smoking, stay positive. It is common to slip up when you first quit, so take it one day at a time.  Be prepared for cravings. When you feel the urge to smoke, chew gum or suck on hard candy.    Lifestyle  Stay busy and take care of your body.  Drink enough fluid to keep your urine pale yellow.  Get plenty of exercise and eat a healthy diet. This can help prevent weight gain after quitting.  Monitor your eating habits. Quitting smoking can cause you to have a larger appetite than when you smoke.  Find ways to relax. Go out with friends or family to a movie or a restaurant where people  do not smoke.  Ask your health care provider about having regular tests (screenings) to check for cancer. This may include blood tests, imaging tests, and other tests.  Find ways to manage your stress, such as meditation, yoga, or exercise.  Where to find support  To get support to quit smoking, consider:  Asking your health care provider for more information and resources.  Taking classes to learn more about quitting smoking.  Looking for local organizations that offer resources about quitting smoking.  Joining a support group for people who want to quit smoking in your local community.  Calling the smokefree.gov counselor helpline: 9-800-Quit-Now (1-656.261.4773)  Where to find more information  You may find more information about quitting smoking from:  HelpGuide.org: www.helpguide.org  Smokefree.gov: smokefree.gov  American Lung Association: www.lung.org  Contact a health care provider if you:  Have problems breathing.  Notice that your lips, nose, or fingers turn blue.  Have chest pain.  Are coughing up blood.  Feel faint or you pass out.  Have other health changes that cause you to worry.  Summary  Smoking tobacco can negatively affect your health, the health of those around you, your finances, and your social life.  Do not start smoking. Quit if you already do. If you need help quitting, ask your health care provider.  Think about joining a support group for people who want to quit smoking in your local community. There are many effective programs that will help you to quit this behavior.  This information is not intended to replace advice given to you by your health care provider. Make sure you discuss any questions you have with your health care provider.  Document Revised: 09/11/2020 Document Reviewed: 01/02/2018  [employer] Patient Education © 2021 [employer]

## 2022-07-22 ENCOUNTER — TELEMEDICINE (OUTPATIENT)
Dept: FAMILY MEDICINE CLINIC | Facility: TELEHEALTH | Age: 32
End: 2022-07-22

## 2022-07-22 DIAGNOSIS — J02.9 PHARYNGITIS, UNSPECIFIED ETIOLOGY: ICD-10-CM

## 2022-07-22 DIAGNOSIS — K08.89 DENTALGIA: Primary | ICD-10-CM

## 2022-07-22 PROCEDURE — 99213 OFFICE O/P EST LOW 20 MIN: CPT | Performed by: NURSE PRACTITIONER

## 2022-07-22 RX ORDER — AMOXICILLIN AND CLAVULANATE POTASSIUM 875; 125 MG/1; MG/1
1 TABLET, FILM COATED ORAL 2 TIMES DAILY
Qty: 20 TABLET | Refills: 0 | Status: SHIPPED | OUTPATIENT
Start: 2022-07-22 | End: 2022-08-01

## 2022-07-22 NOTE — PATIENT INSTRUCTIONS
Keep dental appointment.  Stop clindamycin.  Start Augmentin.  Continue nystatin as previously prescribed and use consistently, oral thrush can take several weeks of treatment to resolve.    Alternate tylenol and motrin for pain and/or fever, stay hydrated and rest.     If symptoms worsen or do not improve follow up with your PCP or visit your nearest Urgent Care Center or ER.

## 2022-07-22 NOTE — PROGRESS NOTES
Subjective   Chief Complaint   Patient presents with   • Sore Throat   • Dental Pain       Rahul Walters is a 31 y.o. male.     Patient presents to discuss changing his previously prescribed antibiotic.  Patient was treated about 4 days ago for dental pain and thrush.  He was prescribed clindamycin, he states when he takes this medication he has body aches and he looked this up online and read that this could be an allergic reaction.  Patient states he is also having a sore throat and feels like his throat is slightly swollen and he still has white spots on the back of the throat.  He states he has been using the nystatin as prescribed.  He will be seeing an oral surgeon at the end of next month for dental problems    Dental Pain   This is a chronic problem. The problem has been waxing and waning. Pertinent negatives include no difficulty swallowing, facial pain, fever, oral bleeding, sinus pressure or thermal sensitivity. Treatments tried: clindamycin.        No Known Allergies    Past Medical History:   Diagnosis Date   • Dental caries    • GERD (gastroesophageal reflux disease)    • Gingivitis    • HTN (hypertension)    • Obesity    • Periodontal disease    • Tobacco dependence        Past Surgical History:   Procedure Laterality Date   • WISDOM TOOTH EXTRACTION  2017       Social History     Socioeconomic History   • Marital status:      Spouse name: Suni   • Number of children: 2   • Years of education: H.S.   • Highest education level: High school graduate   Tobacco Use   • Smoking status: Heavy Tobacco Smoker     Packs/day: 1.00     Years: 12.00     Pack years: 12.00     Types: Cigarettes   • Smokeless tobacco: Never Used   Vaping Use   • Vaping Use: Never used   Substance and Sexual Activity   • Alcohol use: No   • Drug use: No   • Sexual activity: Yes     Partners: Female     Birth control/protection: Tubal ligation       Family History   Problem Relation Age of Onset   • Heart attack  Mother    • Coronary artery disease Mother    • Stroke Father    • Hypertension Father    • No Known Problems Sister    • No Known Problems Brother    • Cancer Maternal Grandmother    • Heart disease Maternal Grandfather    • Hypertension Paternal Grandmother    • Heart attack Paternal Grandmother    • Coronary artery disease Paternal Grandmother    • Alcohol abuse Paternal Grandfather    • No Known Problems Daughter    • No Known Problems Son          Current Outpatient Medications:   •  amoxicillin-clavulanate (Augmentin) 875-125 MG per tablet, Take 1 tablet by mouth 2 (Two) Times a Day for 10 days., Disp: 20 tablet, Rfl: 0  •  cetirizine (zyrTEC) 10 MG tablet, Take 1 tablet by mouth Daily for 30 days., Disp: 30 tablet, Rfl: 0  •  famotidine (PEPCID) 20 MG tablet, Take 1 tablet by mouth 2 (Two) Times a Day. Patient hasnt started yet, ENT prescribed, Disp: 60 tablet, Rfl: 3  •  fluconazole (DIFLUCAN) 150 MG tablet, Take 1 tablet now, repeat in 72 hours if symptoms continue, Disp: 2 tablet, Rfl: 0  •  lisinopril (PRINIVIL,ZESTRIL) 20 MG tablet, Take 1 tablet by mouth Daily., Disp: 90 tablet, Rfl: 3  •  nicotine (NICODERM CQ) 21 MG/24HR patch, Patient hasnt started yet, Disp: , Rfl:   •  nystatin (MYCOSTATIN) 100,000 unit/mL suspension, Swish and swallow 5 mL 4 (Four) Times a Day., Disp: 473 mL, Rfl: 0  •  ondansetron ODT (Zofran ODT) 8 MG disintegrating tablet, Place 1 tablet on the tongue Every 8 (Eight) Hours As Needed for Nausea or Vomiting., Disp: 15 tablet, Rfl: 0  •  [START ON 8/17/2022] varenicline (Chantix Continuing Month Toni) 1 MG tablet, Take 1 tablet by mouth 2 (Two) Times a Day for 56 days., Disp: 56 tablet, Rfl: 1  •  varenicline (CHANTIX TONI) 0.5 MG X 11 & 1 MG X 42 tablet, Take 0.5 mg po daily x 3 days, then 0.5 mg po bid x 4 days, then 1 mg po bid, Disp: 53 tablet, Rfl: 0    Current Facility-Administered Medications:   •  ipratropium-albuterol (DUO-NEB) nebulizer solution 3 mL, 3 mL, Nebulization,  Once, Huong Henry APRN      Review of Systems   Constitutional: Negative for chills, diaphoresis, fatigue and fever.   HENT: Positive for dental problem and sore throat. Negative for facial swelling and sinus pressure.    Respiratory: Negative for cough, chest tightness, shortness of breath and wheezing.    Gastrointestinal: Negative.    Musculoskeletal: Positive for myalgias.   Neurological: Negative for headache.        There were no vitals filed for this visit.    Objective   Physical Exam  Constitutional:       General: He is not in acute distress.     Appearance: Normal appearance. He is not ill-appearing, toxic-appearing or diaphoretic.   HENT:      Head: Normocephalic.      Mouth/Throat:      Lips: Pink.      Mouth: Mucous membranes are moist.      Dentition: Abnormal dentition. Dental tenderness and dental caries present.      Pharynx: Pharyngeal swelling and posterior oropharyngeal erythema present.      Comments: Per pt    Pulmonary:      Effort: Pulmonary effort is normal.   Neurological:      Mental Status: He is alert and oriented to person, place, and time.   Psychiatric:         Mood and Affect: Mood normal.         Behavior: Behavior normal.          Procedures     Assessment & Plan   Diagnoses and all orders for this visit:    1. Dentalgia (Primary)  -     amoxicillin-clavulanate (Augmentin) 875-125 MG per tablet; Take 1 tablet by mouth 2 (Two) Times a Day for 10 days.  Dispense: 20 tablet; Refill: 0    2. Pharyngitis, unspecified etiology  -     amoxicillin-clavulanate (Augmentin) 875-125 MG per tablet; Take 1 tablet by mouth 2 (Two) Times a Day for 10 days.  Dispense: 20 tablet; Refill: 0      Keep dental appointment.  Stop clindamycin.  Start Augmentin.  Continue nystatin as previously prescribed and use consistently, oral thrush can take several weeks of treatment to resolve.    Alternate tylenol and motrin for pain and/or fever, stay hydrated and rest.     If symptoms worsen or do not  improve follow up with your PCP or visit your nearest Urgent Care Center or ER.      PLAN: Discussed dosing, side effects, recommended other symptomatic care.  Patient should follow up with primary care provider, Urgent Care or ER if symptoms worsen, fail to resolve or other symptoms need attention. Patient/family agree to the above.         KRIS George     The use of a video visit has been reviewed with the patient and verbal informed consent has been obtained. Myself and Rahul Walters participated in this visit. The patient is located at [address] [place] [address]. I am located in [place], [place]. Mychart and Zoom were utilized.        This visit was performed via Telehealth.  This patient has been instructed to follow-up with their primary care provider if their symptoms worsen or the treatment provided does not resolve their illness.

## 2022-07-27 ENCOUNTER — TELEPHONE (OUTPATIENT)
Dept: FAMILY MEDICINE CLINIC | Facility: CLINIC | Age: 32
End: 2022-07-27

## 2022-07-27 RX ORDER — PANTOPRAZOLE SODIUM 40 MG/1
TABLET, DELAYED RELEASE ORAL
Qty: 24 TABLET | Refills: 0 | Status: SHIPPED | OUTPATIENT
Start: 2022-07-27 | End: 2022-08-15

## 2022-07-27 NOTE — TELEPHONE ENCOUNTER
----- Message from Boom Benoit MD sent at 7/25/2022  2:20 PM EDT -----  Regarding: FW: Oral surgeon   There any way to get this patient into an oral surgeon more quickly than his already scheduled appointment next month?  ----- Message -----  From: Pallazola, Amanda G, MA  Sent: 7/22/2022   9:47 AM EDT  To: Boom Benoit MD  Subject: FW: Oral surgeon                                   ----- Message -----  From: Rahul Walters  Sent: 7/22/2022   9:16 AM EDT  To: [facility]  Subject: Oral surgeon                                     I was just wondering if there was anyway you would be able to get me into an oral surgeon alot sooner than my appointment next month so I could get these taken care of

## 2022-07-27 NOTE — TELEPHONE ENCOUNTER
Rx Refill Note  Requested Prescriptions     Pending Prescriptions Disp Refills   • pantoprazole (PROTONIX) 40 MG EC tablet [Pharmacy Med Name: Pantoprazole Sodium 40 MG Oral Tablet Delayed Release] 24 tablet 0     Sig: Take 1 tablet by mouth once daily      Last office visit with prescribing clinician: 7/20/2022      Next office visit with prescribing clinician: 7/27/2022            PATRICE STANFORD MA  07/27/22, 10:12 EDT

## 2022-08-15 RX ORDER — PANTOPRAZOLE SODIUM 40 MG/1
TABLET, DELAYED RELEASE ORAL
Qty: 90 TABLET | Refills: 0 | Status: SHIPPED | OUTPATIENT
Start: 2022-08-15 | End: 2022-08-22 | Stop reason: SDUPTHER

## 2022-08-18 ENCOUNTER — OFFICE VISIT (OUTPATIENT)
Dept: FAMILY MEDICINE CLINIC | Facility: CLINIC | Age: 32
End: 2022-08-18

## 2022-08-18 VITALS
DIASTOLIC BLOOD PRESSURE: 70 MMHG | HEIGHT: 71 IN | SYSTOLIC BLOOD PRESSURE: 138 MMHG | OXYGEN SATURATION: 98 % | BODY MASS INDEX: 35.28 KG/M2 | HEART RATE: 96 BPM | TEMPERATURE: 98.6 F | WEIGHT: 252 LBS | RESPIRATION RATE: 19 BRPM

## 2022-08-18 DIAGNOSIS — R11.0 NAUSEA: ICD-10-CM

## 2022-08-18 DIAGNOSIS — R19.7 DIARRHEA, UNSPECIFIED TYPE: Primary | ICD-10-CM

## 2022-08-18 PROCEDURE — 99213 OFFICE O/P EST LOW 20 MIN: CPT | Performed by: NURSE PRACTITIONER

## 2022-08-18 RX ORDER — AMOXICILLIN AND CLAVULANATE POTASSIUM 875; 125 MG/1; MG/1
1 TABLET, FILM COATED ORAL 2 TIMES DAILY
COMMUNITY
Start: 2022-08-13 | End: 2022-10-17

## 2022-08-18 RX ORDER — ONDANSETRON 8 MG/1
8 TABLET, ORALLY DISINTEGRATING ORAL EVERY 8 HOURS PRN
Qty: 15 TABLET | Refills: 0 | Status: SHIPPED | OUTPATIENT
Start: 2022-08-18 | End: 2022-10-17

## 2022-08-18 NOTE — ASSESSMENT & PLAN NOTE
Stool studies ordered including C.Diff testing.  Instructed to eat bland diet and that G.I irritation could result from antibiotic use.  Go to ER if severe abdominal pain unrelieved.  Stay well hydrated.  Will inform of results upon return and review.

## 2022-08-18 NOTE — PROGRESS NOTES
"Chief Complaint  Concerns about c diff    Subjective        Rahul Walters presents to [facility]  Patient is here with Chief complaint  I have been having a lot of dental problems with infections. Has been on antibiotics since March/April.  At the beginning of last week, he was having diarrhea, that lasted a few days.  He then had black stools that were tarry, but has now resolved. He has occasional  abdominal cramping. At night he has has an elevated temp up to 100.0F.  He often has the urge to have a BM, but doesn't evacuate.  For the past two days, he has had 1-3 BM a day, formed, brown stool.  He is currently on antibiotic and is taking Augmentin for a dental infection. He says, he thinks he has thrush.  He has an appointment with an oral surgeon next week.            Objective   Vital Signs:  /70   Pulse 96   Temp 98.6 °F (37 °C)   Resp 19   Ht 180.3 cm (71\")   Wt 114 kg (252 lb)   SpO2 98%   BMI 35.15 kg/m²   Estimated body mass index is 35.15 kg/m² as calculated from the following:    Height as of this encounter: 180.3 cm (71\").    Weight as of this encounter: 114 kg (252 lb).          Physical Exam  Vitals and nursing note reviewed.   Constitutional:       General: He is not in acute distress.     Appearance: Normal appearance. He is not ill-appearing.   HENT:      Head: Normocephalic.      Right Ear: External ear normal.      Left Ear: External ear normal.      Nose: Nose normal.      Mouth/Throat:      Mouth: Mucous membranes are moist.      Pharynx: Oropharynx is clear.   Eyes:      Extraocular Movements: Extraocular movements intact.      Conjunctiva/sclera: Conjunctivae normal.      Pupils: Pupils are equal, round, and reactive to light.   Cardiovascular:      Rate and Rhythm: Normal rate and regular rhythm.      Heart sounds: Normal heart sounds.   Pulmonary:      Effort: Pulmonary effort is normal.      Breath sounds: Normal breath sounds. "   Abdominal:      General: Bowel sounds are normal. There is no distension.      Palpations: Abdomen is soft.      Tenderness: There is no abdominal tenderness. There is no guarding.   Musculoskeletal:         General: Normal range of motion.      Cervical back: Normal range of motion and neck supple.      Right lower leg: No edema.      Left lower leg: No edema.   Skin:     General: Skin is warm and dry.      Findings: No erythema or rash.   Neurological:      Mental Status: He is alert and oriented to person, place, and time.   Psychiatric:         Mood and Affect: Mood normal.         Behavior: Behavior normal.         Thought Content: Thought content normal.         Judgment: Judgment normal.        Result Review :                Assessment and Plan   Diagnoses and all orders for this visit:    1. Diarrhea, unspecified type (Primary)  Assessment & Plan:  Stool studies ordered including C.Diff testing.  Instructed to eat bland diet and that G.I irritation could result from antibiotic use.  Go to ER if severe abdominal pain unrelieved.  Stay well hydrated.  Will inform of results upon return and review.    Orders:  -     Clostridioides difficile EIA - Stool, Per Rectum; Future  -     Gastrointestinal Panel, PCR - Stool, Per Rectum; Future    2. Nausea  -     ondansetron ODT (Zofran ODT) 8 MG disintegrating tablet; Place 1 tablet on the tongue Every 8 (Eight) Hours As Needed for Nausea or Vomiting.  Dispense: 15 tablet; Refill: 0           Follow Up   Return if symptoms worsen or fail to improve.  Patient was given instructions and counseling regarding his condition or for health maintenance advice. Please see specific information pulled into the AVS if appropriate.   I attest that I have reviewed the student note and that the components of the history of the present illness, the physical exam, and the assessment and plan documented were performed by me or were performed in my presence by the student and verified  by me.    Answers for HPI/ROS submitted by the patient on 8/18/2022  What is the primary reason for your visit?: Other  Please describe your symptoms.: Possible c diff  Have you had these symptoms before?: No  How long have you been having these symptoms?: 5-7 days

## 2022-08-19 ENCOUNTER — LAB (OUTPATIENT)
Dept: LAB | Facility: HOSPITAL | Age: 32
End: 2022-08-19

## 2022-08-19 ENCOUNTER — TELEMEDICINE (OUTPATIENT)
Dept: FAMILY MEDICINE CLINIC | Facility: TELEHEALTH | Age: 32
End: 2022-08-19

## 2022-08-19 DIAGNOSIS — R19.7 DIARRHEA, UNSPECIFIED TYPE: ICD-10-CM

## 2022-08-19 DIAGNOSIS — J39.2 SWELLING OF THROAT: Primary | ICD-10-CM

## 2022-08-19 LAB
ADV 40+41 DNA STL QL NAA+NON-PROBE: NOT DETECTED
ASTRO TYP 1-8 RNA STL QL NAA+NON-PROBE: NOT DETECTED
C CAYETANENSIS DNA STL QL NAA+NON-PROBE: NOT DETECTED
C COLI+JEJ+UPSA DNA STL QL NAA+NON-PROBE: NOT DETECTED
C DIFF TOX GENS STL QL NAA+PROBE: NOT DETECTED
CRYPTOSP DNA STL QL NAA+NON-PROBE: NOT DETECTED
E HISTOLYT DNA STL QL NAA+NON-PROBE: NOT DETECTED
EAEC PAA PLAS AGGR+AATA ST NAA+NON-PRB: NOT DETECTED
EC STX1+STX2 GENES STL QL NAA+NON-PROBE: NOT DETECTED
EPEC EAE GENE STL QL NAA+NON-PROBE: NOT DETECTED
ETEC LTA+ST1A+ST1B TOX ST NAA+NON-PROBE: NOT DETECTED
G LAMBLIA DNA STL QL NAA+NON-PROBE: NOT DETECTED
NOROVIRUS GI+II RNA STL QL NAA+NON-PROBE: NOT DETECTED
P SHIGELLOIDES DNA STL QL NAA+NON-PROBE: NOT DETECTED
RVA RNA STL QL NAA+NON-PROBE: NOT DETECTED
S ENT+BONG DNA STL QL NAA+NON-PROBE: NOT DETECTED
SAPO I+II+IV+V RNA STL QL NAA+NON-PROBE: NOT DETECTED
SHIGELLA SP+EIEC IPAH ST NAA+NON-PROBE: NOT DETECTED
V CHOL+PARA+VUL DNA STL QL NAA+NON-PROBE: NOT DETECTED
V CHOLERAE DNA STL QL NAA+NON-PROBE: NOT DETECTED
Y ENTEROCOL DNA STL QL NAA+NON-PROBE: NOT DETECTED

## 2022-08-19 PROCEDURE — 87507 IADNA-DNA/RNA PROBE TQ 12-25: CPT

## 2022-08-19 PROCEDURE — 99212 OFFICE O/P EST SF 10 MIN: CPT | Performed by: NURSE PRACTITIONER

## 2022-08-19 PROCEDURE — 87493 C DIFF AMPLIFIED PROBE: CPT

## 2022-08-19 NOTE — PROGRESS NOTES
"Subjective   Rahul Vasquez Walters is a 32 y.o. male.     Presents with c/o \"swollen throat.\" That has been going on since May. He was told it was due to a dental infection and he has been on antibiotics on and off since May. His throat swells and then goes back down again. He has an appointment next week with an oral surgeon. He is currently taking augmentin. He started the current round of augmentin at the end of last week. He has 4-5 days left of it. He thinks the throat swelling has gotten a little worse since starting the augmentin. He saw his primary care provider yesterday but nothing was done. He is not having trouble swallowing or trouble breathing. He is having slight tooth pain. He is also on nystatin for suspected thrush but he isn't sure he actually has thrush. He notices the swelling when he swallows and moves his chin but it really doesn't bother him too much. He has a prednisone pack and wants to know if he should take that. He is also having diarrhea and undergoing testing for c-diff.       The following portions of the patient's history were reviewed and updated as appropriate: allergies, current medications, past family history, past medical history, past social history, past surgical history, and problem list.    Review of Systems   Constitutional: Negative for fever.   HENT: Positive for dental problem and sore throat.    Respiratory: Negative for cough, shortness of breath and wheezing.    Gastrointestinal: Positive for diarrhea.       Objective   Physical Exam  Constitutional:       General: He is not in acute distress.     Appearance: He is well-developed. He is not diaphoretic.   HENT:      Mouth/Throat:      Dentition: Abnormal dentition. Gingival swelling (mild) and dental caries present.   Pulmonary:      Effort: Pulmonary effort is normal.   Neurological:      Mental Status: He is alert and oriented to person, place, and time.   Psychiatric:         Behavior: Behavior normal. "           Assessment & Plan   Diagnoses and all orders for this visit:    1. Swelling of throat (Primary)             Since the swelling is mild and not bothering him, I think he is ok to just wait and see if he can make it to his dental appointment before changing any medications. If the swelling does worsen or become bothersome he can start the prednisone. To ER for rapid swelling or difficulty breathing.    The use of a video visit has been reviewed with the patient and verbal informed consent has been obtained. Myself and Rahul Walters participated in this visit. The patient is located in [place], [place]. I am located in [place], [place]. Mychart and Zoom were utilized. I spent 20 minutes in the patient's chart for this visit.

## 2022-08-20 NOTE — PATIENT INSTRUCTIONS
-Keep appointment with oral surgeon next week  -If throat swelling worsens or becomes bothersome, start prednisone. To ER for significant throat swelling or trouble breathing.

## 2022-08-22 RX ORDER — PANTOPRAZOLE SODIUM 40 MG/1
40 TABLET, DELAYED RELEASE ORAL DAILY
Qty: 90 TABLET | Refills: 0 | Status: SHIPPED | OUTPATIENT
Start: 2022-08-22 | End: 2022-10-12 | Stop reason: SDUPTHER

## 2022-10-12 ENCOUNTER — TELEMEDICINE (OUTPATIENT)
Dept: FAMILY MEDICINE CLINIC | Facility: TELEHEALTH | Age: 32
End: 2022-10-12

## 2022-10-12 VITALS — WEIGHT: 252 LBS | HEIGHT: 71 IN | BODY MASS INDEX: 35.28 KG/M2 | TEMPERATURE: 99.8 F

## 2022-10-12 DIAGNOSIS — I10 PRIMARY HYPERTENSION: ICD-10-CM

## 2022-10-12 DIAGNOSIS — J06.9 ACUTE URI: Primary | ICD-10-CM

## 2022-10-12 PROCEDURE — 99213 OFFICE O/P EST LOW 20 MIN: CPT | Performed by: NURSE PRACTITIONER

## 2022-10-12 RX ORDER — DEXTROMETHORPHAN HYDROBROMIDE AND PROMETHAZINE HYDROCHLORIDE 15; 6.25 MG/5ML; MG/5ML
5 SYRUP ORAL NIGHTLY PRN
Qty: 118 ML | Refills: 0 | Status: SHIPPED | OUTPATIENT
Start: 2022-10-12 | End: 2022-11-10

## 2022-10-12 RX ORDER — GUAIFENESIN 600 MG/1
600 TABLET, EXTENDED RELEASE ORAL 2 TIMES DAILY
Qty: 28 TABLET | Refills: 0 | Status: SHIPPED | OUTPATIENT
Start: 2022-10-12 | End: 2022-10-26

## 2022-10-12 NOTE — PROGRESS NOTES
You have chosen to receive care through a telehealth visit.  Do you consent to use a video/audio connection for your medical care today? Yes     CHIEF COMPLAINT  Cc: cough, congestion, chills    HPI  Rahul Walters is a 32 y.o. male  presents with complaint of cough, congestion, chills. Additional symtpms that the patient is having are noted in the ROS portion of this visit. His symptoms started 10/10/2022 and then 10/11/2022 he felt a little better.Tonight he feels worse,. He reports a low grade fever. No known exposure to COVID or the flu. He has done a COVID test and the results of that test were negative. He is vaccinated for COVID via two doses of the Pfizer vaccine. He has taken coricidin for his symptoms.    Review of Systems   Constitutional: Positive for chills and fever. Negative for fatigue.   HENT: Positive for congestion and rhinorrhea. Negative for postnasal drip, sinus pressure, sinus pain, sneezing and sore throat.    Respiratory: Positive for cough. Negative for chest tightness, shortness of breath and wheezing.    Cardiovascular: Negative for chest pain.   Gastrointestinal: Negative for diarrhea, nausea and vomiting.   Musculoskeletal: Negative for myalgias.   Neurological: Negative for headaches.       Past Medical History:   Diagnosis Date   • Dental caries    • GERD (gastroesophageal reflux disease)    • Gingivitis    • HTN (hypertension)    • Obesity    • Periodontal disease    • Tobacco dependence        Family History   Problem Relation Age of Onset   • Heart attack Mother    • Coronary artery disease Mother    • Stroke Father    • Hypertension Father    • No Known Problems Sister    • No Known Problems Brother    • Cancer Maternal Grandmother    • Heart disease Maternal Grandfather    • Hypertension Paternal Grandmother    • Heart attack Paternal Grandmother    • Coronary artery disease Paternal Grandmother    • Alcohol abuse Paternal Grandfather    • No Known Problems Daughter   "  • No Known Problems Son        Social History     Socioeconomic History   • Marital status:      Spouse name: Suni   • Number of children: 2   • Years of education: H.S.   • Highest education level: High school graduate   Tobacco Use   • Smoking status: Heavy Smoker     Packs/day: 1.00     Years: 12.00     Pack years: 12.00     Types: Cigarettes   • Smokeless tobacco: Never   Vaping Use   • Vaping Use: Never used   Substance and Sexual Activity   • Alcohol use: No   • Drug use: No   • Sexual activity: Yes     Partners: Female     Birth control/protection: Tubal ligation       Rahul Walters  reports that he has been smoking cigarettes. He has a 12.00 pack-year smoking history. He has never used smokeless tobacco.. I have educated him on the risk of diseases from using tobacco products such as cancer, COPD and heart disease.     I advised him to quit and he is not planning to quit bu has not set a quit date    I spent 3  minutes counseling the patient.    He does have chantix and does plan to set a quit date in the future     Temp 99.8 °F (37.7 °C)   Ht 180.3 cm (71\")   Wt 114 kg (252 lb)   BMI 35.15 kg/m²     PHYSICAL EXAM  Physical Exam   Constitutional: He is oriented to person, place, and time. He appears well-developed and well-nourished.   HENT:   Head: Normocephalic and atraumatic.   Right Ear: External ear normal.   Left Ear: External ear normal.   Nose: Congestion present.   Eyes: Lids are normal. Right eye exhibits no discharge and no exudate. Left eye exhibits no discharge and no exudate. Right conjunctiva is not injected. Left conjunctiva is not injected.   Pulmonary/Chest: No accessory muscle usage. No tachypnea and no bradypnea.  No respiratory distress.No use of oxygen by nasal cannulaNo use of oxygen by mask noted.  Abdominal: Abdomen appears normal.   Neurological: He is alert and oriented to person, place, and time. No cranial nerve deficit.   Skin: His skin appears " normal.  Psychiatric: He has a normal mood and affect. His speech is normal and behavior is normal. Judgment and thought content normal.       Results for orders placed or performed in visit on 08/19/22   Gastrointestinal Panel, PCR - Stool, Per Rectum    Specimen: Per Rectum; Stool   Result Value Ref Range    Campylobacter Not Detected Not Detected    Plesiomonas shigelloides Not Detected Not Detected    Salmonella Not Detected Not Detected    Vibrio Not Detected Not Detected    Vibrio cholerae Not Detected Not Detected    Yersinia enterocolitica Not Detected Not Detected    Enteroaggregative E. coli (EAEC) Not Detected Not Detected    Enteropathogenic E. coli (EPEC) Not Detected Not Detected    Enterotoxigenic E. coli (ETEC) lt/st Not Detected Not Detected    Shiga-like toxin-producing E. coli (STEC) stx1/stx2 Not Detected Not Detected    Shigella/Enteroinvasive E. coli (EIEC) Not Detected Not Detected    Cryptosporidium Not Detected Not Detected    Cyclospora cayetanensis Not Detected Not Detected    Entamoeba histolytica Not Detected Not Detected    Giardia lamblia Not Detected Not Detected    Adenovirus F40/41 Not Detected Not Detected    Astrovirus Not Detected Not Detected    Norovirus GI/GII Not Detected Not Detected    Rotavirus A Not Detected Not Detected    Sapovirus (I, II, IV or V) Not Detected Not Detected   Clostridioides difficile Toxin, PCR - Stool, Per Rectum    Specimen: Per Rectum; Stool   Result Value Ref Range    C. Difficile Toxins by PCR Not Detected Not Detected       Diagnoses and all orders for this visit:    1. Acute URI (Primary)    Other orders  -     guaiFENesin (Mucinex) 600 MG 12 hr tablet; Take 1 tablet by mouth 2 (Two) Times a Day for 14 days.  Dispense: 28 tablet; Refill: 0  -     promethazine-dextromethorphan (PROMETHAZINE-DM) 6.25-15 MG/5ML syrup; Take 5 mL by mouth At Night As Needed for Cough.  Dispense: 118 mL; Refill: 0    Mucinex with plenty of fluids especially water to  thin secretions and help with congestion.  Do not drive after taking promethazine DM as it may make you drowsy  May alternate tylenol and ibuprofen  Hydrate well  May take vitamins C, D and Zinc  Alternate rest and mild exercise as tolerated    FOLLOW-UP  If symptoms worsen or persist follow up with PCP.Virtual Care or Urgent Care    Patient verbalizes understanding of medication dosage, comfort measures, instructions for treatment and follow-up.    Brittanie Le, APRN  10/12/2022  18:52 EDT    The use of a video visit has been reviewed with the patient and verbal informed consent has been obtained. Myself and Rahul Walters participated in this visit. The patient is located in [address] [place] [address].    I am located in [place], [place]. Mychart and Zoom were utilized. I spent 20 minutes in the patient's chart for this visit.

## 2022-10-12 NOTE — PATIENT INSTRUCTIONS
Upper Respiratory Infection, Adult  An upper respiratory infection (URI) is a common viral infection of the nose, throat, and upper air passages that lead to the lungs. The most common type of URI is the common cold. URIs usually get better on their own, without medical treatment.  What are the causes?  A URI is caused by a virus. You may catch a virus by:  Breathing in droplets from an infected person's cough or sneeze.  Touching something that has been exposed to the virus (contaminated) and then touching your mouth, nose, or eyes.  What increases the risk?  You are more likely to get a URI if:  You are very young or very old.  It is gladys or winter.  You have close contact with others, such as at a , school, or health care facility.  You smoke.  You have long-term (chronic) heart or lung disease.  You have a weakened disease-fighting (immune) system.  You have nasal allergies or asthma.  You are experiencing a lot of stress.  You work in an area that has poor air circulation.  You have poor nutrition.  What are the signs or symptoms?  A URI usually involves some of the following symptoms:  Runny or stuffy (congested) nose.  Sneezing.  Cough.  Sore throat.  Headache.  Fatigue.  Fever.  Loss of appetite.  Pain in your forehead, behind your eyes, and over your cheekbones (sinus pain).  Muscle aches.  Redness or irritation of the eyes.  Pressure in the ears or face.  How is this diagnosed?  This condition may be diagnosed based on your medical history and symptoms, and a physical exam. Your health care provider may use a cotton swab to take a mucus sample from your nose (nasal swab). This sample can be tested to determine what virus is causing the illness.  How is this treated?  URIs usually get better on their own within 7-10 days. You can take steps at home to relieve your symptoms. Medicines cannot cure URIs, but your health care provider may recommend certain medicines to help relieve symptoms, such  as:  Over-the-counter cold medicines.  Cough suppressants. Coughing is a type of defense against infection that helps to clear the respiratory system, so take these medicines only as recommended by your health care provider.  Fever-reducing medicines.  Follow these instructions at home:  Activity  Rest as needed.  If you have a fever, stay home from work or school until your fever is gone or until your health care provider says you are no longer contagious. Your health care provider may have you wear a face mask to prevent your infection from spreading.  Relieving symptoms  Gargle with a salt-water mixture 3-4 times a day or as needed. To make a salt-water mixture, completely dissolve ½-1 tsp of salt in 1 cup of warm water.  Use a cool-mist humidifier to add moisture to the air. This can help you breathe more easily.  Eating and drinking    Drink enough fluid to keep your urine pale yellow.  Eat soups and other clear broths.  General instructions    Take over-the-counter and prescription medicines only as told by your health care provider. These include cold medicines, fever reducers, and cough suppressants.  Do not use any products that contain nicotine or tobacco, such as cigarettes and e-cigarettes. If you need help quitting, ask your health care provider.  Stay away from secondhand smoke.  Stay up to date on all immunizations, including the yearly (annual) flu vaccine.  Keep all follow-up visits as told by your health care provider. This is important.  How to prevent the spread of infection to others    URIs can be passed from person to person (are contagious). To prevent the infection from spreading:  Wash your hands often with soap and water. If soap and water are not available, use hand .  Avoid touching your mouth, face, eyes, or nose.  Cough or sneeze into a tissue or your sleeve or elbow instead of into your hand or into the air.  Contact a health care provider if:  You are getting worse instead  of better.  You have a fever or chills.  Your mucus is brown or red.  You have yellow or brown discharge coming from your nose.  You have pain in your face, especially when you bend forward.  You have swollen neck glands.  You have pain while swallowing.  You have white areas in the back of your throat.  Get help right away if:  You have shortness of breath that gets worse.  You have severe or persistent:  Headache.  Ear pain.  Sinus pain.  Chest pain.  You have chronic lung disease along with any of the following:  Wheezing.  Prolonged cough.  Coughing up blood.  A change in your usual mucus.  You have a stiff neck.  You have changes in your:  Vision.  Hearing.  Thinking.  Mood.  Summary  An upper respiratory infection (URI) is a common infection of the nose, throat, and upper air passages that lead to the lungs.  A URI is caused by a virus.  URIs usually get better on their own within 7-10 days.  Medicines cannot cure URIs, but your health care provider may recommend certain medicines to help relieve symptoms.  This information is not intended to replace advice given to you by your health care provider. Make sure you discuss any questions you have with your health care provider.  Document Revised: 08/26/2021 Document Reviewed: 08/26/2021  [employer] Patient Education © 2022 [employer]

## 2022-10-13 RX ORDER — PANTOPRAZOLE SODIUM 40 MG/1
40 TABLET, DELAYED RELEASE ORAL DAILY
Qty: 90 TABLET | Refills: 0 | Status: SHIPPED | OUTPATIENT
Start: 2022-10-13 | End: 2023-02-23 | Stop reason: SDUPTHER

## 2022-10-13 RX ORDER — LISINOPRIL 20 MG/1
20 TABLET ORAL DAILY
Qty: 90 TABLET | Refills: 3 | Status: SHIPPED | OUTPATIENT
Start: 2022-10-13 | End: 2023-02-23 | Stop reason: SDUPTHER

## 2022-10-20 ENCOUNTER — OFFICE VISIT (OUTPATIENT)
Dept: FAMILY MEDICINE CLINIC | Facility: CLINIC | Age: 32
End: 2022-10-20

## 2022-10-20 VITALS
DIASTOLIC BLOOD PRESSURE: 72 MMHG | SYSTOLIC BLOOD PRESSURE: 124 MMHG | OXYGEN SATURATION: 96 % | HEART RATE: 92 BPM | HEIGHT: 71 IN | TEMPERATURE: 97.6 F | BODY MASS INDEX: 33.88 KG/M2 | WEIGHT: 242 LBS

## 2022-10-20 DIAGNOSIS — I10 PRIMARY HYPERTENSION: Primary | ICD-10-CM

## 2022-10-20 DIAGNOSIS — F17.200 TOBACCO DEPENDENCE: ICD-10-CM

## 2022-10-20 DIAGNOSIS — J30.89 ENVIRONMENTAL AND SEASONAL ALLERGIES: ICD-10-CM

## 2022-10-20 DIAGNOSIS — K21.9 GASTROESOPHAGEAL REFLUX DISEASE, UNSPECIFIED WHETHER ESOPHAGITIS PRESENT: ICD-10-CM

## 2022-10-20 PROCEDURE — 99214 OFFICE O/P EST MOD 30 MIN: CPT | Performed by: FAMILY MEDICINE

## 2022-10-20 RX ORDER — LORATADINE 10 MG/1
10 TABLET ORAL DAILY
Qty: 30 TABLET | Refills: 0 | Status: SHIPPED | OUTPATIENT
Start: 2022-10-20 | End: 2022-11-12

## 2022-10-20 RX ORDER — FLUTICASONE PROPIONATE 50 MCG
2 SPRAY, SUSPENSION (ML) NASAL DAILY
Qty: 18.2 ML | Refills: 0 | Status: SHIPPED | OUTPATIENT
Start: 2022-10-20 | End: 2022-11-12

## 2022-10-20 NOTE — ASSESSMENT & PLAN NOTE
Patient currently ill with pansinusitis.  Patient has not been using Flonase or Claritin and reports postnasal drip.  He will restart Flonase and Claritin in addition to the medications he was given by the urgent treatment center to treat sinusitis.

## 2022-10-20 NOTE — ASSESSMENT & PLAN NOTE
Chronic and stable has worsened recently since he has been off of pantoprazole while taking medications to treat pansinusitis.  He will restart pantoprazole once current illness has resolved.

## 2022-10-20 NOTE — PROGRESS NOTES
Rahul Walters is a 32 y.o. male who presents today for Hypertension and Heartburn      Patient is here to follow-up on HTN and GERD. Patient has been taking lisinopril but has not been checking blood pressure at home. He has been trying to improve diet but is limited in what he can eat due to dental issues. He is saving up for dentures. He has not been taking pantorazole for the last couple of days due to being on cough medication. When he takes the medication his GERD symptoms are well controlled. He was recently seen at [facility] and is being treated for pan sinsitis. He has continued to smoke and has cut back some. He has chantix but has not started this medication.        Review of Systems   Constitutional: Negative for fever and unexpected weight loss.   HENT: Positive for congestion, postnasal drip, rhinorrhea and sinus pressure. Negative for ear pain and sore throat.    Eyes: Negative for visual disturbance.   Respiratory: Positive for cough. Negative for shortness of breath and wheezing.    Cardiovascular: Negative for chest pain and palpitations.   Gastrointestinal: Positive for GERD. Negative for abdominal pain, blood in stool, constipation, diarrhea, nausea and vomiting.   Endocrine: Negative for polydipsia and polyuria.   Genitourinary: Negative for difficulty urinating.   Musculoskeletal: Negative for joint swelling.   Skin: Negative for rash and skin lesions.   Allergic/Immunologic: Negative for environmental allergies.   Neurological: Negative for seizures and syncope.   Hematological: Does not bruise/bleed easily.   Psychiatric/Behavioral: Negative for suicidal ideas.        The following portions of the patient's history were reviewed and updated as appropriate: allergies, current medications, past family history, past medical history, past social history, past surgical history and problem list.    Current Outpatient Medications on File Prior to Visit   Medication Sig Dispense Refill   •  "amoxicillin-clavulanate (AUGMENTIN) 875-125 MG per tablet Take 1 tablet by mouth 2 (Two) Times a Day. 20 tablet 0   • ciprofloxacin (CILOXAN) 0.3 % ophthalmic solution Administer 2 drops to the right eye Every 4 (Four) Hours. 5 mL 0   • guaiFENesin (Mucinex) 600 MG 12 hr tablet Take 1 tablet by mouth 2 (Two) Times a Day for 14 days. 28 tablet 0   • lisinopril (PRINIVIL,ZESTRIL) 20 MG tablet Take 1 tablet by mouth Daily. 90 tablet 3   • pantoprazole (PROTONIX) 40 MG EC tablet Take 1 tablet by mouth Daily. 90 tablet 0   • predniSONE (DELTASONE) 10 MG (21) dose pack Use as directed on package 21 tablet 0   • promethazine-dextromethorphan (PROMETHAZINE-DM) 6.25-15 MG/5ML syrup Take 5 mL by mouth At Night As Needed for Cough. 118 mL 0     Current Facility-Administered Medications on File Prior to Visit   Medication Dose Route Frequency Provider Last Rate Last Admin   • [DISCONTINUED] ipratropium-albuterol (DUO-NEB) nebulizer solution 3 mL  3 mL Nebulization Once Huong Henry, KRIS           No Known Allergies     Visit Vitals  /72 (BP Location: Left arm, Patient Position: Sitting, Cuff Size: Large Adult)   Pulse 92   Temp 97.6 °F (36.4 °C)   Ht 180.3 cm (71\")   Wt 110 kg (242 lb)   SpO2 96%   BMI 33.75 kg/m²        Physical Exam  Constitutional:       General: He is not in acute distress.     Appearance: He is well-developed. He is not diaphoretic.   HENT:      Head: Atraumatic.   Cardiovascular:      Rate and Rhythm: Normal rate and regular rhythm.      Heart sounds: Normal heart sounds. No murmur heard.    No friction rub. No gallop.   Pulmonary:      Effort: Pulmonary effort is normal. No respiratory distress.      Breath sounds: Normal breath sounds. No stridor. No wheezing, rhonchi or rales.   Musculoskeletal:      Cervical back: Normal range of motion and neck supple.   Skin:     General: Skin is warm and dry.   Neurological:      Mental Status: He is alert and oriented to person, place, and time. "   Psychiatric:         Behavior: Behavior normal.               Problems Addressed this Visit        Allergies and Adverse Reactions    Environmental and seasonal allergies     Patient currently ill with pansinusitis.  Patient has not been using Flonase or Claritin and reports postnasal drip.  He will restart Flonase and Claritin in addition to the medications he was given by the urgent treatment center to treat sinusitis.         Relevant Medications    fluticasone (FLONASE) 50 MCG/ACT nasal spray    loratadine (Claritin) 10 MG tablet       Cardiac and Vasculature    HTN (hypertension) - Primary     Hypertension is improving with treatment.  Continue current treatment regimen.  Blood pressure will be reassessed in 3 months.            Gastrointestinal Abdominal     GERD (gastroesophageal reflux disease)     Chronic and stable has worsened recently since he has been off of pantoprazole while taking medications to treat pansinusitis.  He will restart pantoprazole once current illness has resolved.            Tobacco    Tobacco dependence     Rahul Walters  reports that he has been smoking cigarettes. He has a 12.00 pack-year smoking history. He has never used smokeless tobacco.. I have educated him on the risk of diseases from using tobacco products such as cancer, COPD, heart disease and cataracts.     I advised him to quit and he is willing to quit. We have discussed the following method/s for tobacco cessation:  Education Material Counseling [place] OTC Cessation Products Prescription Medicaiton.  Together we have set a quit date for 1 month from today.  He will follow up with me in 3 months or sooner to check on his progress.    I spent 3.5 minutes counseling the patient.             Diagnoses       Codes Comments    Primary hypertension    -  Primary ICD-10-CM: I10  ICD-9-CM: 401.9     Gastroesophageal reflux disease, unspecified whether esophagitis present     ICD-10-CM: K21.9  ICD-9-CM: 530.81      Tobacco dependence     ICD-10-CM: F17.200  ICD-9-CM: 305.1     Environmental and seasonal allergies     ICD-10-CM: J30.89  ICD-9-CM: 477.8           Return in about 3 months (around 1/20/2023) for Follow-up HTN, GERD, smoking.    Boom Benoit MD   10/20/2022

## 2022-10-20 NOTE — PATIENT INSTRUCTIONS
"Hypertension, Adult  High blood pressure (hypertension) is when the force of blood pumping through the arteries is too strong. The arteries are the blood vessels that carry blood from the heart throughout the body. Hypertension forces the heart to work harder to pump blood and may cause arteries to become narrow or stiff. Untreated or uncontrolled hypertension can cause a heart attack, heart failure, a stroke, kidney disease, and other problems.  A blood pressure reading consists of a higher number over a lower number. Ideally, your blood pressure should be below 120/80. The first (\"top\") number is called the systolic pressure. It is a measure of the pressure in your arteries as your heart beats. The second (\"bottom\") number is called the diastolic pressure. It is a measure of the pressure in your arteries as the heart relaxes.  What are the causes?  The exact cause of this condition is not known. There are some conditions that result in or are related to high blood pressure.  What increases the risk?  Some risk factors for high blood pressure are under your control. The following factors may make you more likely to develop this condition:  Smoking.  Having type 2 diabetes mellitus, high cholesterol, or both.  Not getting enough exercise or physical activity.  Being overweight.  Having too much fat, sugar, calories, or salt (sodium) in your diet.  Drinking too much alcohol.  Some risk factors for high blood pressure may be difficult or impossible to change. Some of these factors include:  Having chronic kidney disease.  Having a family history of high blood pressure.  Age. Risk increases with age.  Race. You may be at higher risk if you are .  Gender. Men are at higher risk than women before age 45. After age 65, women are at higher risk than men.  Having obstructive sleep apnea.  Stress.  What are the signs or symptoms?  High blood pressure may not cause symptoms. Very high blood pressure " (hypertensive crisis) may cause:  Headache.  Anxiety.  Shortness of breath.  Nosebleed.  Nausea and vomiting.  Vision changes.  Severe chest pain.  Seizures.  How is this diagnosed?  This condition is diagnosed by measuring your blood pressure while you are seated, with your arm resting on a flat surface, your legs uncrossed, and your feet flat on the floor. The cuff of the blood pressure monitor will be placed directly against the skin of your upper arm at the level of your heart. It should be measured at least twice using the same arm. Certain conditions can cause a difference in blood pressure between your right and left arms.  Certain factors can cause blood pressure readings to be lower or higher than normal for a short period of time:  When your blood pressure is higher when you are in a health care provider's office than when you are at home, this is called white coat hypertension. Most people with this condition do not need medicines.  When your blood pressure is higher at home than when you are in a health care provider's office, this is called masked hypertension. Most people with this condition may need medicines to control blood pressure.  If you have a high blood pressure reading during one visit or you have normal blood pressure with other risk factors, you may be asked to:  Return on a different day to have your blood pressure checked again.  Monitor your blood pressure at home for 1 week or longer.  If you are diagnosed with hypertension, you may have other blood or imaging tests to help your health care provider understand your overall risk for other conditions.  How is this treated?  This condition is treated by making healthy lifestyle changes, such as eating healthy foods, exercising more, and reducing your alcohol intake. Your health care provider may prescribe medicine if lifestyle changes are not enough to get your blood pressure under control, and if:  Your systolic blood pressure is above  130.  Your diastolic blood pressure is above 80.  Your personal target blood pressure may vary depending on your medical conditions, your age, and other factors.  Follow these instructions at home:  Eating and drinking    Eat a diet that is high in fiber and potassium, and low in sodium, added sugar, and fat. An example eating plan is called the DASH (Dietary Approaches to Stop Hypertension) diet. To eat this way:  Eat plenty of fresh fruits and vegetables. Try to fill one half of your plate at each meal with fruits and vegetables.  Eat whole grains, such as whole-wheat pasta, brown rice, or whole-grain bread. Fill about one fourth of your plate with whole grains.  Eat or drink low-fat dairy products, such as skim milk or low-fat yogurt.  Avoid fatty cuts of meat, processed or cured meats, and poultry with skin. Fill about one fourth of your plate with lean proteins, such as fish, chicken without skin, beans, eggs, or tofu.  Avoid pre-made and processed foods. These tend to be higher in sodium, added sugar, and fat.  Reduce your daily sodium intake. Most people with hypertension should eat less than 1,500 mg of sodium a day.  Do not drink alcohol if:  Your health care provider tells you not to drink.  You are pregnant, may be pregnant, or are planning to become pregnant.  If you drink alcohol:  Limit how much you use to:  0-1 drink a day for women.  0-2 drinks a day for men.  Be aware of how much alcohol is in your drink. In the [place] one drink equals one 12 oz bottle of beer (355 mL), one 5 oz glass of wine (148 mL), or one 1½ oz glass of hard liquor (44 mL).  Lifestyle    Work with your health care provider to maintain a healthy body weight or to lose weight. Ask what an ideal weight is for you.  Get at least 30 minutes of exercise most days of the week. Activities may include walking, swimming, or biking.  Include exercise to strengthen your muscles (resistance exercise), such as Pilates or lifting weights, as  part of your weekly exercise routine. Try to do these types of exercises for 30 minutes at least 3 days a week.  Do not use any products that contain nicotine or tobacco, such as cigarettes, e-cigarettes, and chewing tobacco. If you need help quitting, ask your health care provider.  Monitor your blood pressure at home as told by your health care provider.  Keep all follow-up visits as told by your health care provider. This is important.  Medicines  Take over-the-counter and prescription medicines only as told by your health care provider. Follow directions carefully. Blood pressure medicines must be taken as prescribed.  Do not skip doses of blood pressure medicine. Doing this puts you at risk for problems and can make the medicine less effective.  Ask your health care provider about side effects or reactions to medicines that you should watch for.  Contact a health care provider if you:  Think you are having a reaction to a medicine you are taking.  Have headaches that keep coming back (recurring).  Feel dizzy.  Have swelling in your ankles.  Have trouble with your vision.  Get help right away if you:  Develop a severe headache or confusion.  Have unusual weakness or numbness.  Feel faint.  Have severe pain in your chest or abdomen.  Vomit repeatedly.  Have trouble breathing.  Summary  Hypertension is when the force of blood pumping through your arteries is too strong. If this condition is not controlled, it may put you at risk for serious complications.  Your personal target blood pressure may vary depending on your medical conditions, your age, and other factors. For most people, a normal blood pressure is less than 120/80.  Hypertension is treated with lifestyle changes, medicines, or a combination of both. Lifestyle changes include losing weight, eating a healthy, low-sodium diet, exercising more, and limiting alcohol.  This information is not intended to replace advice given to you by your health care  provider. Make sure you discuss any questions you have with your health care provider.  Document Revised: 08/28/2019 Document Reviewed: 08/28/2019  [employer] Patient Education © 2022 [employer]  Smoking Tobacco Information, Adult  Smoking tobacco can be harmful to your health. Tobacco contains a poisonous (toxic), colorless chemical called nicotine. Nicotine is addictive. It changes the brain and can make it hard to stop smoking. Tobacco also has other toxic chemicals that can hurt your body and raise your risk of many cancers.  How can smoking tobacco affect me?  Smoking tobacco puts you at risk for:  Cancer. Smoking is most commonly associated with lung cancer, but can also lead to cancer in other parts of the body.  Chronic obstructive pulmonary disease (COPD). This is a long-term lung condition that makes it hard to breathe. It also gets worse over time.  High blood pressure (hypertension), heart disease, stroke, or heart attack.  Lung infections, such as pneumonia.  Cataracts. This is when the lenses in the eyes become clouded.  Digestive problems. This may include peptic ulcers, heartburn, and gastroesophageal reflux disease (GERD).  Oral health problems, such as gum disease and tooth loss.  Loss of taste and smell.  Smoking can affect your appearance by causing:  Wrinkles.  Yellow or stained teeth, fingers, and fingernails.  Smoking tobacco can also affect your social life, because:  It may be challenging to find places to smoke when away from home. Many workplaces, restaurants, hotels, and public places are tobacco-free.  Smoking is expensive. This is due to the cost of tobacco and the long-term costs of treating health problems from smoking.  Secondhand smoke may affect those around you. Secondhand smoke can cause lung cancer, breathing problems, and heart disease. Children of smokers have a higher risk for:  Sudden infant death syndrome (SIDS).  Ear infections.  Lung infections.  If you currently smoke  tobacco, quitting now can help you:  Lead a longer and healthier life.  Look, smell, breathe, and feel better over time.  Save money.  Protect others from the harms of secondhand smoke.  What actions can I take to prevent health problems?  Quit smoking    Do not start smoking. Quit if you already do.  Make a plan to quit smoking and commit to it. Look for programs to help you and ask your health care provider for recommendations and ideas.  Set a date and write down all the reasons you want to quit.  Let your friends and family know you are quitting so they can help and support you. Consider finding friends who also want to quit. It can be easier to quit with someone else, so that you can support each other.  Talk with your health care provider about using nicotine replacement medicines to help you quit, such as gum, lozenges, patches, sprays, or pills.  Do not replace cigarette smoking with electronic cigarettes, which are commonly called e-cigarettes. The safety of e-cigarettes is not known, and some may contain harmful chemicals.  If you try to quit but return to smoking, stay positive. It is common to slip up when you first quit, so take it one day at a time.  Be prepared for cravings. When you feel the urge to smoke, chew gum or suck on hard candy.  Lifestyle  Stay busy and take care of your body.  Drink enough fluid to keep your urine pale yellow.  Get plenty of exercise and eat a healthy diet. This can help prevent weight gain after quitting.  Monitor your eating habits. Quitting smoking can cause you to have a larger appetite than when you smoke.  Find ways to relax. Go out with friends or family to a movie or a restaurant where people do not smoke.  Ask your health care provider about having regular tests (screenings) to check for cancer. This may include blood tests, imaging tests, and other tests.  Find ways to manage your stress, such as meditation, yoga, or exercise.  Where to find support  To get  support to quit smoking, consider:  Asking your health care provider for more information and resources.  Taking classes to learn more about quitting smoking.  Looking for local organizations that offer resources about quitting smoking.  Joining a support group for people who want to quit smoking in your local community.  Calling the smokefree.gov counselor helpline: 1-800-Quit-Now (1-199.418.6197)  Where to find more information  You may find more information about quitting smoking from:  HelpGuide.org: www.helpguide.org  Smokefree.gov: smokefree.gov  American Lung Association: www.lung.org  Contact a health care provider if you:  Have problems breathing.  Notice that your lips, nose, or fingers turn blue.  Have chest pain.  Are coughing up blood.  Feel faint or you pass out.  Have other health changes that cause you to worry.  Summary  Smoking tobacco can negatively affect your health, the health of those around you, your finances, and your social life.  Do not start smoking. Quit if you already do. If you need help quitting, ask your health care provider.  Think about joining a support group for people who want to quit smoking in your local community. There are many effective programs that will help you to quit this behavior.  This information is not intended to replace advice given to you by your health care provider. Make sure you discuss any questions you have with your health care provider.  Document Revised: 08/22/2022 Document Reviewed: 11/09/2021  [employer] Patient Education © 2022 [employer]

## 2022-10-20 NOTE — ASSESSMENT & PLAN NOTE
Rahul Walters  reports that he has been smoking cigarettes. He has a 12.00 pack-year smoking history. He has never used smokeless tobacco.. I have educated him on the risk of diseases from using tobacco products such as cancer, COPD, heart disease and cataracts.     I advised him to quit and he is willing to quit. We have discussed the following method/s for tobacco cessation:  Education Material Counseling [place] OTC Cessation Products Prescription Medicaiton.  Together we have set a quit date for 1 month from today.  He will follow up with me in 3 months or sooner to check on his progress.    I spent 3.5 minutes counseling the patient.

## 2022-11-10 ENCOUNTER — TELEMEDICINE (OUTPATIENT)
Dept: FAMILY MEDICINE CLINIC | Facility: TELEHEALTH | Age: 32
End: 2022-11-10

## 2022-11-10 DIAGNOSIS — J06.9 UPPER RESPIRATORY TRACT INFECTION, UNSPECIFIED TYPE: Primary | ICD-10-CM

## 2022-11-10 DIAGNOSIS — R09.89 SUSPECTED NOVEL INFLUENZA A VIRUS INFECTION: ICD-10-CM

## 2022-11-10 PROCEDURE — 99213 OFFICE O/P EST LOW 20 MIN: CPT | Performed by: NURSE PRACTITIONER

## 2022-11-10 RX ORDER — OSELTAMIVIR PHOSPHATE 75 MG/1
75 CAPSULE ORAL 2 TIMES DAILY
Qty: 10 CAPSULE | Refills: 0 | Status: SHIPPED | OUTPATIENT
Start: 2022-11-10 | End: 2022-11-12

## 2022-11-10 RX ORDER — DEXTROMETHORPHAN HYDROBROMIDE AND PROMETHAZINE HYDROCHLORIDE 15; 6.25 MG/5ML; MG/5ML
5 SYRUP ORAL 4 TIMES DAILY PRN
Qty: 118 ML | Refills: 0 | Status: SHIPPED | OUTPATIENT
Start: 2022-11-10 | End: 2023-02-23

## 2022-11-10 RX ORDER — BENZONATATE 200 MG/1
200 CAPSULE ORAL 3 TIMES DAILY PRN
Qty: 30 CAPSULE | Refills: 0 | Status: SHIPPED | OUTPATIENT
Start: 2022-11-10 | End: 2023-02-23

## 2022-11-10 RX ORDER — ALBUTEROL SULFATE 90 UG/1
2 AEROSOL, METERED RESPIRATORY (INHALATION) EVERY 4 HOURS PRN
Qty: 18 G | Refills: 0 | Status: SHIPPED | OUTPATIENT
Start: 2022-11-10 | End: 2023-02-23 | Stop reason: SDUPTHER

## 2022-11-10 NOTE — PROGRESS NOTES
CHIEF COMPLAINT  Chief Complaint   Patient presents with   • Cough   • URI   • Shortness of Breath         HPI  Rahul Walters is a 32 y.o. male  presents with complaint of cough and shortness of breath with headache. He had been on antibiotics 2 weeks ago for an URI, and got better. He has had these symptoms for 2 days. He reports a fever and chills. He tested negative with a home Covid 19 test.     Review of Systems   Constitutional: Positive for activity change, appetite change, chills, fatigue and fever.   HENT: Negative for ear pain, sinus pressure, sinus pain and sore throat.    Respiratory: Positive for cough and shortness of breath. Negative for wheezing and stridor.    Cardiovascular: Negative.    Gastrointestinal: Negative.    Neurological: Positive for headaches.   Hematological: Negative.    Psychiatric/Behavioral: Negative.        Past Medical History:   Diagnosis Date   • Dental caries    • GERD (gastroesophageal reflux disease)    • Gingivitis    • HTN (hypertension)    • Obesity    • Periodontal disease    • Tobacco dependence        Family History   Problem Relation Age of Onset   • Heart attack Mother    • Coronary artery disease Mother    • Stroke Father    • Hypertension Father    • No Known Problems Sister    • No Known Problems Brother    • Cancer Maternal Grandmother    • Heart disease Maternal Grandfather    • Hypertension Paternal Grandmother    • Heart attack Paternal Grandmother    • Coronary artery disease Paternal Grandmother    • Alcohol abuse Paternal Grandfather    • No Known Problems Daughter    • No Known Problems Son        Social History     Socioeconomic History   • Marital status:      Spouse name: Suni   • Number of children: 2   • Years of education: H.S.   • Highest education level: High school graduate   Tobacco Use   • Smoking status: Heavy Smoker     Packs/day: 1.00     Years: 12.00     Pack years: 12.00     Types: Cigarettes   • Smokeless tobacco:  Never   Vaping Use   • Vaping Use: Never used   Substance and Sexual Activity   • Alcohol use: No   • Drug use: No   • Sexual activity: Yes     Partners: Female     Birth control/protection: Tubal ligation         There were no vitals taken for this visit.    PHYSICAL EXAM  Physical Exam   Constitutional: He is oriented to person, place, and time. He appears well-developed and well-nourished. He does not have a sickly appearance. He does not appear ill. No distress.   HENT:   Head: Normocephalic and atraumatic.   Right Ear: Hearing normal. No tenderness.   Left Ear: Hearing normal. No tenderness.   Nose: Nose normal. Right sinus exhibits no maxillary sinus tenderness and no frontal sinus tenderness. Left sinus exhibits no maxillary sinus tenderness and no frontal sinus tenderness.   Mouth/Throat: Mouth/Lips are normal.  Pulmonary/Chest: Effort normal.  No respiratory distress. He no audible wheeze...  Neurological: He is alert and oriented to person, place, and time.   Psychiatric: He has a normal mood and affect.   Vitals reviewed.      Results for orders placed or performed in visit on 08/19/22   Gastrointestinal Panel, PCR - Stool, Per Rectum    Specimen: Per Rectum; Stool   Result Value Ref Range    Campylobacter Not Detected Not Detected    Plesiomonas shigelloides Not Detected Not Detected    Salmonella Not Detected Not Detected    Vibrio Not Detected Not Detected    Vibrio cholerae Not Detected Not Detected    Yersinia enterocolitica Not Detected Not Detected    Enteroaggregative E. coli (EAEC) Not Detected Not Detected    Enteropathogenic E. coli (EPEC) Not Detected Not Detected    Enterotoxigenic E. coli (ETEC) lt/st Not Detected Not Detected    Shiga-like toxin-producing E. coli (STEC) stx1/stx2 Not Detected Not Detected    Shigella/Enteroinvasive E. coli (EIEC) Not Detected Not Detected    Cryptosporidium Not Detected Not Detected    Cyclospora cayetanensis Not Detected Not Detected    Entamoeba  histolytica Not Detected Not Detected    Giardia lamblia Not Detected Not Detected    Adenovirus F40/41 Not Detected Not Detected    Astrovirus Not Detected Not Detected    Norovirus GI/GII Not Detected Not Detected    Rotavirus A Not Detected Not Detected    Sapovirus (I, II, IV or V) Not Detected Not Detected   Clostridioides difficile Toxin, PCR - Stool, Per Rectum    Specimen: Per Rectum; Stool   Result Value Ref Range    C. Difficile Toxins by PCR Not Detected Not Detected       Diagnoses and all orders for this visit:    1. Upper respiratory tract infection, unspecified type (Primary)  -     albuterol sulfate  (90 Base) MCG/ACT inhaler; Inhale 2 puffs Every 4 (Four) Hours As Needed for Wheezing. With spacer.  Dispense: 18 g; Refill: 0  -     promethazine-dextromethorphan (PROMETHAZINE-DM) 6.25-15 MG/5ML syrup; Take 5 mL by mouth 4 (Four) Times a Day As Needed for Cough.  Dispense: 118 mL; Refill: 0  -     benzonatate (TESSALON) 200 MG capsule; Take 1 capsule by mouth 3 (Three) Times a Day As Needed for Cough.  Dispense: 30 capsule; Refill: 0    2. Suspected novel influenza A virus infection  -     oseltamivir (Tamiflu) 75 MG capsule; Take 1 capsule by mouth 2 (Two) Times a Day.  Dispense: 10 capsule; Refill: 0    fluids and rest   Declines Covid 19 PCR test today.   Quarantine as directed.   Follow up with [facility] prn worsening s/s   Follow up with PCP if no improvement in 5-7 days.     You have tested positive for Covid-19. Please quarantine for 10 days since symptoms first appeared. If symptoms fully resolve, isolation may be shortened and end after day 5 on the first day without symptoms. Wear a well-fitting face mask for 10 full days since the start of symptoms. Isolation should not be shortened if a mask cannot be worn properly and consistently. 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    The use of a video visit has been  reviewed with the patient and verbal informd consent has een obtained. Myself and Rahul Walters participated in this visit. The patient is located in [address] [place] [address]. I am located in [place], [place]. Mychart and Zoom were utilized. I spent 15 minutes in the patient's chart for this visit.           Rachel Greco, KRIS  11/10/2022  16:18 EST

## 2022-11-13 ENCOUNTER — HOSPITAL ENCOUNTER (EMERGENCY)
Facility: HOSPITAL | Age: 32
Discharge: HOME | End: 2022-11-13
Attending: EMERGENCY MEDICINE | Admitting: EMERGENCY MEDICINE

## 2022-11-13 ENCOUNTER — APPOINTMENT (OUTPATIENT)
Dept: GENERAL RADIOLOGY | Facility: HOSPITAL | Age: 32
End: 2022-11-13

## 2022-11-13 VITALS
BODY MASS INDEX: 35 KG/M2 | SYSTOLIC BLOOD PRESSURE: 130 MMHG | HEIGHT: 71 IN | DIASTOLIC BLOOD PRESSURE: 72 MMHG | HEART RATE: 88 BPM | WEIGHT: 250 LBS | OXYGEN SATURATION: 95 % | RESPIRATION RATE: 16 BRPM | TEMPERATURE: 98.5 F

## 2022-11-13 DIAGNOSIS — J20.9 ACUTE BRONCHITIS, UNSPECIFIED ORGANISM: Primary | ICD-10-CM

## 2022-11-13 PROCEDURE — 71045 X-RAY EXAM CHEST 1 VIEW: CPT

## 2022-11-13 PROCEDURE — 94664 DEMO&/EVAL PT USE INHALER: CPT

## 2022-11-13 PROCEDURE — 99283 EMERGENCY DEPT VISIT LOW MDM: CPT

## 2022-11-13 PROCEDURE — 94640 AIRWAY INHALATION TREATMENT: CPT

## 2022-11-13 PROCEDURE — 63710000001 PREDNISONE PER 1 MG: Performed by: NURSE PRACTITIONER

## 2022-11-13 RX ORDER — IPRATROPIUM BROMIDE AND ALBUTEROL SULFATE 2.5; .5 MG/3ML; MG/3ML
3 SOLUTION RESPIRATORY (INHALATION) ONCE
Status: COMPLETED | OUTPATIENT
Start: 2022-11-13 | End: 2022-11-13

## 2022-11-13 RX ORDER — PREDNISONE 20 MG/1
60 TABLET ORAL ONCE
Status: COMPLETED | OUTPATIENT
Start: 2022-11-13 | End: 2022-11-13

## 2022-11-13 RX ADMIN — IPRATROPIUM BROMIDE AND ALBUTEROL SULFATE 3 ML: .5; 3 SOLUTION RESPIRATORY (INHALATION) at 10:00

## 2022-11-13 RX ADMIN — PREDNISONE 60 MG: 20 TABLET ORAL at 09:22

## 2022-11-13 NOTE — DISCHARGE INSTRUCTIONS
Continue usual medication regimen.  Start the Medrol Dosepak tomorrow.  Continue the albuterol inhaler.  Maintain your very best hydration and nutrition.  Follow-up with your primary care provider to monitor your recovery.  Thank you

## 2022-11-13 NOTE — ED PROVIDER NOTES
" EMERGENCY DEPARTMENT ENCOUNTER    Pt Name: Rahul Walters  MRN: [id number]  Pt :   1990  Room Number:    Date of encounter:  2022  PCP: Boom Benoit MD  ED Provider: KRIS Naqvi    Historian: Patient      HPI:  Chief Complaint: Cough and wheezing        Context: Rahul Walters is a 32 y.o. male who presents to the ED c/o cough and wheezing onset 4 days ago.  Patient states he was seen in urgent treatment center yesterday and was diagnosed \"with a sinus infection\".  He was placed on doxycycline.  He was advised to seek emergency attention for a chest x-ray.  Patient has smoked a pack a day for approximately 12 years.    Review of systems is positive for fever on day 1 of symptoms but none since then.  Negative for nasal congestion or significant sore throat.  Negative for chest pain but positive for shortness of breath.  Positive for cough without hemoptysis.  GI and  systems are negative.  No profound weakness, dizziness or syncope.  No neurosensory complaint or focal weakness      PAST MEDICAL HISTORY  Past Medical History:   Diagnosis Date   • Dental caries    • GERD (gastroesophageal reflux disease)    • Gingivitis    • HTN (hypertension)    • Obesity    • Periodontal disease    • Tobacco dependence          PAST SURGICAL HISTORY  Past Surgical History:   Procedure Laterality Date   • TOOTH EXTRACTION  2022   • WISDOM TOOTH EXTRACTION  2017         FAMILY HISTORY  Family History   Problem Relation Age of Onset   • Heart attack Mother    • Coronary artery disease Mother    • Stroke Father    • Hypertension Father    • No Known Problems Sister    • No Known Problems Brother    • Cancer Maternal Grandmother    • Heart disease Maternal Grandfather    • Hypertension Paternal Grandmother    • Heart attack Paternal Grandmother    • Coronary artery disease Paternal Grandmother    • Alcohol abuse Paternal Grandfather    • No Known Problems Daughter    • No " Known Problems Son          SOCIAL HISTORY  Social History     Socioeconomic History   • Marital status:      Spouse name: Suni   • Number of children: 2   • Years of education: H.S.   • Highest education level: High school graduate   Tobacco Use   • Smoking status: Heavy Smoker     Packs/day: 1.00     Years: 12.00     Pack years: 12.00     Types: Cigarettes   • Smokeless tobacco: Never   Vaping Use   • Vaping Use: Never used   Substance and Sexual Activity   • Alcohol use: No   • Drug use: No   • Sexual activity: Yes     Partners: Female     Birth control/protection: Tubal ligation         ALLERGIES  Patient has no known allergies.        REVIEW OF SYSTEMS  Review of Systems   All systems reviewed and negative except for those discussed in HPI.       PHYSICAL EXAM    I have reviewed the triage vital signs and nursing notes.    ED Triage Vitals [11/13/22 0819]   Temp Heart Rate Resp BP SpO2   98.5 °F (36.9 °C) 109 20 159/95 96 %      Temp src Heart Rate Source Patient Position BP Location FiO2 (%)   Oral Monitor Sitting Left arm --       Physical Exam  GENERAL:   Appears in no acute distress.  He is a very good historian.  His heart rate is 100  HENT: Nares patent.  Tympanic membranes are visualized and benign.  Posterior pharynx is benign  EYES: No scleral icterus.  CV: Regular rhythm, heart rate 100.  No peripheral edema  RESPIRATORY: Normal effort.  No rales or rhonchi.  Positive for bilateral wheezing.  No respiratory distress  ABDOMEN: Soft, nontender  MUSCULOSKELETAL: No deformities.   NEURO: Alert, moves all extremities, follows commands.  No photophobia or meningeal signs.  No neurosensory deficits or focal weakness SKIN: Warm, dry, no rash visualized.        LAB RESULTS  No results found for this or any previous visit (from the past 24 hour(s)).    If labs were ordered, I independently reviewed the results.        RADIOLOGY  XR Chest 1 View    Result Date: 11/13/2022  DATE OF EXAM: 11/13/2022 8:50  AM  PROCEDURE: XR CHEST 1 VW-  INDICATIONS: wheezing cough  COMPARISON: No Comparisons Available  TECHNIQUE: Single radiographic view of the chest was obtained.  FINDINGS: Lungs are normally expanded. Heart size is normal. No pneumothorax or pleural effusion or focal pulmonary parenchymal opacity. Pulmonary vessels are distinct. Bones and soft tissues are normal.      No acute cardiopulmonary abnormality.  This report was finalized on 11/13/2022 9:05 AM by Nik Bustos MD.          PROCEDURES    Procedures    No orders to display       MEDICATIONS GIVEN IN ER    Medications   ipratropium-albuterol (DUO-NEB) nebulizer solution 3 mL (has no administration in time range)   predniSONE (DELTASONE) tablet 60 mg (60 mg Oral Given 11/13/22 0922)         ED Course as of 11/13/22 0929   Sun Nov 13, 2022   0848 Patient chooses to defer any nasal swab today. [MS]   0927 Patient's chest x-ray is negative for acute findings.  He is oxygenating well.  He is taking doxycycline as prescribed since yesterday.  He has albuterol inhaler prescribed yesterday.  We will add a steroid Dosepak as well to start tomorrow.  Prednisone 60 mg given today.  We discussed parameters for concern that would warrant return to the emergency department.  Patient understands and concurs this outpatient plan and close follow-up [MS]      ED Course User Index  [MS] Leatha Olguin Cyndy, APRN             AS OF 09:29 EST VITALS:    BP - 153/80  HR - 109  TEMP - 98.5 °F (36.9 °C) (Oral)  O2 SATS - 96%                  DIAGNOSIS  Final diagnoses:   Acute bronchitis, unspecified organism         DISPOSITION    DISCHARGE    Patient discharged in stable condition.    Reviewed implications of results, diagnosis, meds, responsibility to follow up, warning signs and symptoms of possible worsening, potential complications and reasons to return to ER.    Patient/Family voiced understanding of above instructions.    Discussed plan for discharge, as there is no emergent  indication for admission.  Pt/family is agreeable and understands need for follow up and possible repeat testing.  Pt/family is aware that discharge does not mean that nothing is wrong but that it indicates no emergency is currently present that requires admission and they must continue care with follow-up as given below or with a physician of their choice.     FOLLOW-UP  Boom Benoit MD  [address] [place], [place] [id number] [phone]    Schedule an appointment as soon as possible for a visit in 2 days  If symptoms worsen         Medication List      No changes were made to your prescriptions during this visit.                  Leatha Olguin, APRN  11/13/22 0929

## 2022-11-15 ENCOUNTER — OFFICE VISIT (OUTPATIENT)
Dept: FAMILY MEDICINE CLINIC | Facility: CLINIC | Age: 32
End: 2022-11-15

## 2022-11-15 VITALS
HEIGHT: 71 IN | OXYGEN SATURATION: 98 % | HEART RATE: 103 BPM | DIASTOLIC BLOOD PRESSURE: 92 MMHG | TEMPERATURE: 98.6 F | BODY MASS INDEX: 35.06 KG/M2 | SYSTOLIC BLOOD PRESSURE: 154 MMHG | WEIGHT: 250.4 LBS

## 2022-11-15 DIAGNOSIS — J20.9 BRONCHITIS WITH BRONCHOSPASM: Primary | ICD-10-CM

## 2022-11-15 PROCEDURE — 99213 OFFICE O/P EST LOW 20 MIN: CPT | Performed by: PHYSICIAN ASSISTANT

## 2022-11-15 NOTE — PROGRESS NOTES
Follow Up Office Visit      Date: 11/15/2022   Patient Name: Rahul Walters  : 1990   MRN: [id number]     Chief Complaint:    Chief Complaint   Patient presents with   • Bronchitis     ER diagnosed him with Bronchitis on   Yesterday afternoon Pt noticed white bumps in his throat  Since March pt has had several dental issues and stuff and his is still taking antibiotics, he thinks maybe he is amune to them now       History of Present Illness: Rahul Walters is a 32 y.o. male who is here today to follow up with bronchitis.  He has had a congested productive cough and went to the ER on .  They placed him on doxycycline, prednisone Dosepak and albuterol.  Has also had Tessalon Perles and Promethazine DM.  He states that yesterday he still feel pretty bad but after taking his meds last night he felt better.  He denies any fever.  No no severe shortness of breath.  Yesterday he saw some white spots in his uvula but denies any pain in the throat.      Subjective      Review of systems:  Review of Systems   Constitutional: Negative for fatigue and fever.   HENT: Negative for ear pain, rhinorrhea, sore throat and swollen glands.    Respiratory: Positive for cough and wheezing.    Cardiovascular: Negative for chest pain and leg swelling.   Gastrointestinal: Negative for abdominal pain and vomiting.   Musculoskeletal: Negative for neck pain.   Skin: Negative for rash.        I have reviewed and the following portions of the patient's history were updated as appropriate: past family history, past medical history, past social history, past surgical history and problem list.    Medications:     Current Outpatient Medications:   •  albuterol sulfate  (90 Base) MCG/ACT inhaler, Inhale 2 puffs Every 4 (Four) Hours As Needed for Wheezing. With spacer., Disp: 18 g, Rfl: 0  •  benzonatate (TESSALON) 200 MG capsule, Take 1 capsule by mouth 3 (Three) Times a Day As Needed for Cough.,  "Disp: 30 capsule, Rfl: 0  •  doxycycline (MONODOX) 100 MG capsule, Take 1 capsule by mouth 2 (Two) Times a Day for 10 days., Disp: 20 capsule, Rfl: 0  •  lisinopril (PRINIVIL,ZESTRIL) 20 MG tablet, Take 1 tablet by mouth Daily., Disp: 90 tablet, Rfl: 3  •  pantoprazole (PROTONIX) 40 MG EC tablet, Take 1 tablet by mouth Daily., Disp: 90 tablet, Rfl: 0  •  predniSONE (DELTASONE) 10 MG (21) dose pack, Use as directed on package, Disp: 21 tablet, Rfl: 0  •  Spacer/Aero-Holding Chambers (AeroChamber Plus Santos-Vu) misc, , Disp: , Rfl:   •  promethazine-dextromethorphan (PROMETHAZINE-DM) 6.25-15 MG/5ML syrup, Take 5 mL by mouth 4 (Four) Times a Day As Needed for Cough., Disp: 118 mL, Rfl: 0    Allergies:   No Known Allergies    Objective     Vital Signs:   Vitals:    11/15/22 0815   BP: 154/92   Pulse: 103   Temp: 98.6 °F (37 °C)   TempSrc: Infrared   SpO2: 98%   Weight: 114 kg (250 lb 6.4 oz)   Height: 180.3 cm (71\")   PainSc: 0-No pain     Body mass index is 34.92 kg/m².          Physical Exam:   Physical Exam  Vitals and nursing note reviewed.   Constitutional:       Appearance: Normal appearance.   HENT:      Head: Normocephalic and atraumatic.      Nose: No congestion or rhinorrhea.      Mouth/Throat:      Mouth: Mucous membranes are moist.      Pharynx: Oropharynx is clear. No posterior oropharyngeal erythema.   Cardiovascular:      Rate and Rhythm: Normal rate and regular rhythm.   Pulmonary:      Effort: Pulmonary effort is normal.      Breath sounds: Wheezing and rhonchi present. No decreased breath sounds or rales.   Musculoskeletal:      Cervical back: Neck supple.      Right lower leg: No edema.      Left lower leg: No edema.   Lymphadenopathy:      Cervical: No cervical adenopathy.   Neurological:      Mental Status: He is alert.          Assessment / Plan      Assessment/Plan:   Diagnoses and all orders for this visit:    1. Bronchitis with bronchospasm (Primary)    He seems to be improving.  Add Mucinex with " plenty of water.  Continue current medications and finish these.  If he has any problems he is to let me know.    Follow Up:   No follow-ups on file.    Chary Garrido PA-C   [facility] [place]

## 2022-12-28 ENCOUNTER — OFFICE VISIT (OUTPATIENT)
Dept: FAMILY MEDICINE CLINIC | Facility: CLINIC | Age: 32
End: 2022-12-28

## 2022-12-28 VITALS
OXYGEN SATURATION: 98 % | HEART RATE: 84 BPM | HEIGHT: 71 IN | DIASTOLIC BLOOD PRESSURE: 85 MMHG | BODY MASS INDEX: 35.32 KG/M2 | TEMPERATURE: 98.2 F | SYSTOLIC BLOOD PRESSURE: 124 MMHG | WEIGHT: 252.3 LBS

## 2022-12-28 DIAGNOSIS — K59.00 CONSTIPATION, UNSPECIFIED CONSTIPATION TYPE: Primary | ICD-10-CM

## 2022-12-28 PROCEDURE — 99213 OFFICE O/P EST LOW 20 MIN: CPT | Performed by: PHYSICIAN ASSISTANT

## 2022-12-28 NOTE — PROGRESS NOTES
Follow Up Office Visit      Date: 2022   Patient Name: Rahul Walters  : 1990   MRN: [id number]     Chief Complaint:    Chief Complaint   Patient presents with   • Constipation     About 4 days ago, last week prior  Pt said he had a lot of pain in his side and in his back  He feels better now but that's why he made the appt.  He has a history of concerns with c-dif and colitis       History of Present Illness: Rahul Walters is a 32 y.o. male who is here today to follow up with constipation for about a week. Had some left sided and low back discomfort. Exlax medication helped, constipation resolved for most part. He was concerned about C. Diff and colitis. No blood in stool.    He also mentions intermittent swelling just under his chin.  He states he has no pain with this.  Area seems to swell and then go back down.  He has had this evaluated by ear nose and throat he thought it was just a lymph node that was reacting to some dental issues.  He has had all his teeth removed at this time and recently this occurred again.      Subjective      Review of systems:  Review of Systems   Constitutional: Negative for fatigue and fever.   HENT: Negative for sore throat and trouble swallowing.    Respiratory: Negative for shortness of breath.    Cardiovascular: Negative for chest pain and leg swelling.   Gastrointestinal: Positive for constipation. Negative for abdominal pain, blood in stool, diarrhea and nausea.   Musculoskeletal: Positive for back pain.        I have reviewed and the following portions of the patient's history were updated as appropriate: past family history, past medical history, past social history, past surgical history and problem list.    Medications:     Current Outpatient Medications:   •  albuterol sulfate  (90 Base) MCG/ACT inhaler, Inhale 2 puffs Every 4 (Four) Hours As Needed for Wheezing. With spacer., Disp: 18 g, Rfl: 0  •  lisinopril  "(PRINIVIL,ZESTRIL) 20 MG tablet, Take 1 tablet by mouth Daily., Disp: 90 tablet, Rfl: 3  •  pantoprazole (PROTONIX) 40 MG EC tablet, Take 1 tablet by mouth Daily., Disp: 90 tablet, Rfl: 0  •  Spacer/Aero-Holding Chambers (AeroChamber Plus Santos-Vu) misc, , Disp: , Rfl:   •  benzonatate (TESSALON) 200 MG capsule, Take 1 capsule by mouth 3 (Three) Times a Day As Needed for Cough., Disp: 30 capsule, Rfl: 0  •  predniSONE (DELTASONE) 10 MG (21) dose pack, Use as directed on package, Disp: 21 tablet, Rfl: 0  •  promethazine-dextromethorphan (PROMETHAZINE-DM) 6.25-15 MG/5ML syrup, Take 5 mL by mouth 4 (Four) Times a Day As Needed for Cough., Disp: 118 mL, Rfl: 0    Allergies:   No Known Allergies    Objective     Vital Signs:   Vitals:    12/28/22 0832   BP: 124/85   Pulse: 84   Temp: 98.2 °F (36.8 °C)   TempSrc: Infrared   SpO2: 98%   Weight: 114 kg (252 lb 4.8 oz)   Height: 180.3 cm (70.98\")   PainSc: 0-No pain     Body mass index is 35.2 kg/m².   Class 2 Severe Obesity (BMI >=35 and <=39.9). Obesity-related health conditions include the following: hypertension and GERD. Obesity is unchanged. BMI is is above average; BMI management plan is completed. We discussed low calorie, low carb based diet program, portion control and increasing exercise.      Physical Exam:   Physical Exam  Vitals and nursing note reviewed.   Constitutional:       Appearance: Normal appearance.   Neck:      Comments: Careful exam of the submandibular area shows no swelling at this time.  No nodules.  No abnormal findings.  Cardiovascular:      Rate and Rhythm: Normal rate and regular rhythm.   Pulmonary:      Effort: Pulmonary effort is normal.      Breath sounds: Normal breath sounds.   Abdominal:      Palpations: Abdomen is soft. There is no mass.      Tenderness: There is no abdominal tenderness. There is no right CVA tenderness, left CVA tenderness, guarding or rebound.   Musculoskeletal:      Cervical back: Neck supple.      Right lower leg: " No edema.      Left lower leg: No edema.   Lymphadenopathy:      Cervical: No cervical adenopathy.   Neurological:      Mental Status: He is alert.          Assessment / Plan      Assessment/Plan:   Diagnoses and all orders for this visit:    1. Constipation, unspecified constipation type (Primary)  -     XR Abdomen KUB; Future    Recommend increase water intake as well as fiber.  May also use MiraLAX as needed, this is a nonstimulant laxative.    Discussed his symptom of intermittent submandibular swelling.  Suspect this may be a salivary gland that occasionally gets irritated or has it stuck partially blocked with a stone or sludge.  Certainly nothing on exam today.  Recommend continue to monitor when this occurs again.    Follow Up:   No follow-ups on file.    Chary Garrido PA-C   [facility] [place]

## 2023-01-05 ENCOUNTER — TELEPHONE (OUTPATIENT)
Dept: FAMILY MEDICINE CLINIC | Facility: CLINIC | Age: 33
End: 2023-01-05

## 2023-01-05 NOTE — TELEPHONE ENCOUNTER
Caller: Rahul Walters    Relationship: Self    Best call back number: [phone]    What medication are you requesting: ANYTHING TO HELP    What are your current symptoms:  BOWELS BEING BACKED UP    How long have you been experiencing symptoms: 2-3 WEEKS    Have you had these symptoms before:    [x] Yes  [] No    Have you been treated for these symptoms before:   [x] Yes  [] No    If a prescription is needed, what is your preferred pharmacy and phone number: [facility] [id number] - [place] - [address] - [phone]  - [phone] FX

## 2023-02-23 ENCOUNTER — OFFICE VISIT (OUTPATIENT)
Dept: FAMILY MEDICINE CLINIC | Facility: CLINIC | Age: 33
End: 2023-02-23
Payer: COMMERCIAL

## 2023-02-23 VITALS
TEMPERATURE: 98.6 F | OXYGEN SATURATION: 98 % | HEIGHT: 71 IN | DIASTOLIC BLOOD PRESSURE: 82 MMHG | SYSTOLIC BLOOD PRESSURE: 126 MMHG | HEART RATE: 92 BPM | WEIGHT: 247.8 LBS | BODY MASS INDEX: 34.69 KG/M2

## 2023-02-23 DIAGNOSIS — R42 DIZZINESS OF UNKNOWN CAUSE: ICD-10-CM

## 2023-02-23 DIAGNOSIS — N50.89 SCROTUM SWELLING: ICD-10-CM

## 2023-02-23 DIAGNOSIS — I10 PRIMARY HYPERTENSION: Primary | ICD-10-CM

## 2023-02-23 DIAGNOSIS — N50.811 PAIN IN BOTH TESTICLES: ICD-10-CM

## 2023-02-23 DIAGNOSIS — R06.2 WHEEZE: ICD-10-CM

## 2023-02-23 DIAGNOSIS — N50.812 PAIN IN BOTH TESTICLES: ICD-10-CM

## 2023-02-23 DIAGNOSIS — R36.1 BLOOD IN SEMEN: ICD-10-CM

## 2023-02-23 DIAGNOSIS — K21.9 GASTROESOPHAGEAL REFLUX DISEASE, UNSPECIFIED WHETHER ESOPHAGITIS PRESENT: ICD-10-CM

## 2023-02-23 DIAGNOSIS — R06.02 CHRONIC SHORTNESS OF BREATH: ICD-10-CM

## 2023-02-23 PROCEDURE — 99214 OFFICE O/P EST MOD 30 MIN: CPT | Performed by: FAMILY MEDICINE

## 2023-02-23 RX ORDER — ALBUTEROL SULFATE 90 UG/1
2 AEROSOL, METERED RESPIRATORY (INHALATION) EVERY 4 HOURS PRN
Qty: 18 G | Refills: 0 | Status: SHIPPED | OUTPATIENT
Start: 2023-02-23

## 2023-02-23 RX ORDER — PANTOPRAZOLE SODIUM 20 MG/1
20 TABLET, DELAYED RELEASE ORAL DAILY
Qty: 90 TABLET | Refills: 1 | Status: SHIPPED | OUTPATIENT
Start: 2023-02-23

## 2023-02-23 RX ORDER — LISINOPRIL 20 MG/1
20 TABLET ORAL DAILY
Qty: 90 TABLET | Refills: 3 | Status: SHIPPED | OUTPATIENT
Start: 2023-02-23

## 2023-02-23 NOTE — ASSESSMENT & PLAN NOTE
Patient has reported pain in the testicles, swelling of the scrotum, and had a week long episode of blood in his semen last year.  Pain in testicles and swelling of the scrotum has persisted.  No gross abnormalities noted on exam today.  Ultrasound of the testicles and scrotum was ordered.  Patient was given referral to urology for further evaluation and treatment.  RTC/ED precautions given.

## 2023-02-23 NOTE — ASSESSMENT & PLAN NOTE
Patient has had chronic shortness of breath and wheeze since having bronchitis.  Patient was given a refill on the albuterol inhaler which does provide some relief.  Patient was encouraged to discontinue smoking.  PFTs and chest x-ray were ordered today.  RTC/ED precautions given.

## 2023-02-23 NOTE — PROGRESS NOTES
Rahul Walters is a 32 y.o. male who presents today for Shortness of Breath (Ever since pt had bronchitis he has struggled with shortness of breath.), Hypertension, and Heartburn      Patient has been taking lisinopril daily but has not been checking blood pressure since it came under control. He has no adverse effects from medication. He does need a refill. Patient has seen improvement in GERD symptoms but does notice spicy and acidic food still cause discomfort so he works to avoid these. He continues pantoprazole which he feels is working well for him. He does need a refill. Since patient had bronchitis in November he continues to have SOA, occasional wheeze, and chest tightness. He has a mild cough occasionally. He has been using the albuterol inhaler 1-2 times a day. He states he usually feels fine when he first wakes up but through the day symptoms develop. He states that the breathing is not getting better or worse. He notices that smoking worsens his symptoms. He denies fever, chills, N/V, palpitations, CP, body aches, HA, vision changes, or edema. Patient did not have asthma as a child that he knows of and has not had lung issues in the past. He has not had COVID that he knows of. Patient still has dizziness from time to time not related to the SOA. He has not seen neurology or cardiology after being given a referral for further evaluation of dizziness a year or two ago. Patient has seen swelling and tenderness in his scrotum and testicles over the past several months.  Last year he was seeing blood when he ejaculated that lasted for a few weeks but has gone away and not returned. He has not felt a mass or lump in his scrotum.        Review of Systems   Constitutional: Negative for chills, diaphoresis, fatigue, fever and unexpected weight loss.   HENT: Negative for congestion, ear pain and sore throat.    Eyes: Negative for visual disturbance.   Respiratory: Positive for chest tightness and  shortness of breath. Negative for cough and wheezing.    Cardiovascular: Negative for chest pain and palpitations.   Gastrointestinal: Negative for abdominal pain, blood in stool, constipation, diarrhea, nausea, vomiting and GERD.   Endocrine: Negative for polydipsia and polyuria.   Genitourinary: Positive for scrotal swelling and testicular pain. Negative for difficulty urinating, discharge, frequency, genital sores, hematuria, penile pain, erectile dysfunction and penile swelling.   Musculoskeletal: Negative for joint swelling.   Skin: Negative for rash and skin lesions.   Allergic/Immunologic: Negative for environmental allergies.   Neurological: Positive for dizziness. Negative for seizures, syncope and confusion.   Hematological: Does not bruise/bleed easily.   Psychiatric/Behavioral: Negative for suicidal ideas.        The following portions of the patient's history were reviewed and updated as appropriate: allergies, current medications, past family history, past medical history, past social history, past surgical history and problem list.    Current Outpatient Medications on File Prior to Visit   Medication Sig Dispense Refill   • Spacer/Aero-Holding Chambers (AeroChamber Plus Santos-Vu) misc      • [DISCONTINUED] albuterol sulfate  (90 Base) MCG/ACT inhaler Inhale 2 puffs Every 4 (Four) Hours As Needed for Wheezing. With spacer. 18 g 0   • [DISCONTINUED] lisinopril (PRINIVIL,ZESTRIL) 20 MG tablet Take 1 tablet by mouth Daily. 90 tablet 3   • [DISCONTINUED] pantoprazole (PROTONIX) 40 MG EC tablet Take 1 tablet by mouth Daily. 90 tablet 0   • [DISCONTINUED] benzonatate (TESSALON) 200 MG capsule Take 1 capsule by mouth 3 (Three) Times a Day As Needed for Cough. 30 capsule 0   • [DISCONTINUED] predniSONE (DELTASONE) 10 MG (21) dose pack Use as directed on package 21 tablet 0   • [DISCONTINUED] promethazine-dextromethorphan (PROMETHAZINE-DM) 6.25-15 MG/5ML syrup Take 5 mL by mouth 4 (Four) Times a Day As  "Needed for Cough. 118 mL 0     No current facility-administered medications on file prior to visit.       No Known Allergies     Visit Vitals  /82   Pulse 92   Temp 98.6 °F (37 °C) (Infrared)   Ht 180.3 cm (71\")   Wt 112 kg (247 lb 12.8 oz)   SpO2 98%   BMI 34.56 kg/m²        Physical Exam  Exam conducted with a chaperone present.   Constitutional:       General: He is not in acute distress.     Appearance: He is well-developed. He is not diaphoretic.   HENT:      Head: Atraumatic.   Cardiovascular:      Rate and Rhythm: Normal rate and regular rhythm.      Heart sounds: Normal heart sounds. No murmur heard.    No friction rub. No gallop.   Pulmonary:      Effort: Pulmonary effort is normal. No respiratory distress.      Breath sounds: Normal breath sounds. No stridor. No wheezing, rhonchi or rales.   Abdominal:      General: Bowel sounds are normal. There is no distension.      Palpations: Abdomen is soft. There is no mass.      Tenderness: There is no abdominal tenderness. There is no guarding or rebound.      Hernia: No hernia is present. There is no hernia in the left inguinal area or right inguinal area.   Genitourinary:     Pubic Area: No rash or pubic lice.       Penis: Normal and uncircumcised. No phimosis, paraphimosis, hypospadias, erythema, tenderness, discharge, swelling or lesions.       Testes: Cremasteric reflex is present.         Right: Tenderness present. Mass, swelling, testicular hydrocele or varicocele not present. Right testis is descended. Cremasteric reflex is present.          Left: Tenderness present. Mass, swelling, testicular hydrocele or varicocele not present. Left testis is descended. Cremasteric reflex is present.       Epididymis:      Right: Normal.      Left: Normal.   Musculoskeletal:      Cervical back: Normal range of motion and neck supple.   Lymphadenopathy:      Lower Body: No right inguinal adenopathy. No left inguinal adenopathy.   Skin:     General: Skin is warm and " dry.   Neurological:      Mental Status: He is alert and oriented to person, place, and time.   Psychiatric:         Behavior: Behavior normal.               Problems Addressed this Visit        Cardiac and Vasculature    HTN (hypertension) - Primary     Hypertension is unchanged.  Continue current treatment regimen.  Blood pressure will be reassessed in 3 months.         Relevant Medications    lisinopril (PRINIVIL,ZESTRIL) 20 MG tablet       Gastrointestinal Abdominal     GERD (gastroesophageal reflux disease)     Chronic and stable.  Patient has seen significant improvement with diet lifestyle modification as well as pantoprazole 40 mg daily.  We will decrease to pantoprazole 20 mg daily.  Patient will continue diet lifestyle modification.         Relevant Medications    pantoprazole (PROTONIX) 20 MG EC tablet       Genitourinary and Reproductive     Scrotum swelling    Relevant Orders    US Scrotum & Testicles    Ambulatory Referral to Urology    Blood in semen    Relevant Orders    US Scrotum & Testicles    Ambulatory Referral to Urology       Pulmonary and Pneumonias    Chronic shortness of breath    Relevant Medications    albuterol sulfate  (90 Base) MCG/ACT inhaler    Other Relevant Orders    XR Chest PA & Lateral    Full Pulmonary Function Test With Bronchodilator    Wheeze     Patient has had chronic shortness of breath and wheeze since having bronchitis.  Patient was given a refill on the albuterol inhaler which does provide some relief.  Patient was encouraged to discontinue smoking.  PFTs and chest x-ray were ordered today.  RTC/ED precautions given.         Relevant Medications    albuterol sulfate  (90 Base) MCG/ACT inhaler    Other Relevant Orders    XR Chest PA & Lateral    Full Pulmonary Function Test With Bronchodilator       Symptoms and Signs    Dizziness of unknown cause     Patient has chronic dizziness of unknown cause.  Patient was given referral to cardiology and nephrology  in the past was unable to complete these appointments.  Patient was given new referrals today.         Relevant Orders    Ambulatory Referral to Neurology    Ambulatory Referral to Cardiology       Other    Pain in both testicles     Patient has reported pain in the testicles, swelling of the scrotum, and had a week long episode of blood in his semen last year.  Pain in testicles and swelling of the scrotum has persisted.  No gross abnormalities noted on exam today.  Ultrasound of the testicles and scrotum was ordered.  Patient was given referral to urology for further evaluation and treatment.  RTC/ED precautions given.         Relevant Orders    US Scrotum & Testicles    Ambulatory Referral to Urology   Diagnoses       Codes Comments    Primary hypertension    -  Primary ICD-10-CM: I10  ICD-9-CM: 401.9     Gastroesophageal reflux disease, unspecified whether esophagitis present     ICD-10-CM: K21.9  ICD-9-CM: 530.81     Chronic shortness of breath     ICD-10-CM: R06.02  ICD-9-CM: 786.05     Wheeze     ICD-10-CM: R06.2  ICD-9-CM: 786.07     Dizziness of unknown cause     ICD-10-CM: R42  ICD-9-CM: 780.4     Pain in both testicles     ICD-10-CM: N50.811, N50.812  ICD-9-CM: 608.9     Scrotum swelling     ICD-10-CM: N50.89  ICD-9-CM: 608.86     Blood in semen     ICD-10-CM: R36.1  ICD-9-CM: 608.82           Return in about 3 months (around 5/23/2023) for Follow-up SOA, HTN, scrotal pain.    Boom Benoit MD   2/23/2023

## 2023-02-23 NOTE — ASSESSMENT & PLAN NOTE
Chronic and stable.  Patient has seen significant improvement with diet lifestyle modification as well as pantoprazole 40 mg daily.  We will decrease to pantoprazole 20 mg daily.  Patient will continue diet lifestyle modification.

## 2023-02-23 NOTE — ASSESSMENT & PLAN NOTE
Patient has chronic dizziness of unknown cause.  Patient was given referral to cardiology and nephrology in the past was unable to complete these appointments.  Patient was given new referrals today.

## 2023-02-24 ENCOUNTER — TELEPHONE (OUTPATIENT)
Dept: FAMILY MEDICINE CLINIC | Facility: CLINIC | Age: 33
End: 2023-02-24
Payer: COMMERCIAL

## 2023-02-24 NOTE — TELEPHONE ENCOUNTER
Hub/front can read     ----- Message from Boom Benoit MD sent at 2/24/2023  9:13 AM EST -----  Please let the patient know that his chest x-ray showed no acute abnormalities.  Cardiomediastinal silhouette was within normal limits.  The lungs were clear.  There is no focal consolidation, pneumothorax, or significant pleural effusion. Osseous structures are grossly intact.

## 2023-03-15 ENCOUNTER — TELEMEDICINE (OUTPATIENT)
Dept: FAMILY MEDICINE CLINIC | Facility: TELEHEALTH | Age: 33
End: 2023-03-15
Payer: COMMERCIAL

## 2023-03-15 DIAGNOSIS — J03.90 TONSILLITIS: Primary | ICD-10-CM

## 2023-03-15 PROCEDURE — 99213 OFFICE O/P EST LOW 20 MIN: CPT | Performed by: NURSE PRACTITIONER

## 2023-03-15 RX ORDER — PREDNISONE 20 MG/1
20 TABLET ORAL DAILY
Qty: 7 TABLET | Refills: 0 | Status: SHIPPED | OUTPATIENT
Start: 2023-03-15 | End: 2023-03-22

## 2023-03-15 RX ORDER — AMOXICILLIN 500 MG/1
500 CAPSULE ORAL 2 TIMES DAILY
Qty: 20 CAPSULE | Refills: 0 | Status: SHIPPED | OUTPATIENT
Start: 2023-03-15 | End: 2023-03-25

## 2023-03-15 NOTE — PROGRESS NOTES
Chief Complaint   Patient presents with   • Headache   • Sore Throat       Video Visit Reason:   Free Text Description: Headache sore throat  Subjective   Rahul Vasquez Walters is a 32 y.o. male.     History of Present Illness  Sore throat, headache for a few days with what he felt is fever, not measured. He has some sinus pressure about the frontal area but no postnasal drainage, nasal congestion or cough.  Headache  Headache pattern:  Some headache always there, and the pain doesn't change much  Sore Throat   This is a new problem. Episode onset: 2-3 days. The problem has been gradually worsening. Maximum temperature: felt feverish but not checked temp. Associated symptoms include headaches, shortness of breath (he has had SOA since having bronchitis in November, he is a smoker and is scheduled w/u) and swollen glands. Pertinent negatives include no abdominal pain, congestion, coughing, ear pain, hoarse voice, plugged ear sensation or trouble swallowing. He has had no exposure to strep or mono.       The following portions of the patient's history were reviewed and updated as appropriate: allergies, current medications, past medical history, and problem list.      Past Medical History:   Diagnosis Date   • Dental caries    • GERD (gastroesophageal reflux disease)    • Gingivitis    • HTN (hypertension)    • Obesity    • Periodontal disease    • Tobacco dependence      Social History     Socioeconomic History   • Marital status:      Spouse name: Suni   • Number of children: 2   • Years of education: H.S.   • Highest education level: High school graduate   Tobacco Use   • Smoking status: Heavy Smoker     Packs/day: 1.00     Years: 12.00     Pack years: 12.00     Types: Cigarettes     Passive exposure: Never   • Smokeless tobacco: Never   Vaping Use   • Vaping Use: Never used   Substance and Sexual Activity   • Alcohol use: No   • Drug use: No   • Sexual activity: Yes     Partners: Female     Birth  control/protection: Tubal ligation     medication documentation: reviewed and updated with patient and   Current Outpatient Medications:   •  albuterol sulfate  (90 Base) MCG/ACT inhaler, Inhale 2 puffs Every 4 (Four) Hours As Needed for Wheezing. With spacer., Disp: 18 g, Rfl: 0  •  lisinopril (PRINIVIL,ZESTRIL) 20 MG tablet, Take 1 tablet by mouth Daily., Disp: 90 tablet, Rfl: 3  •  pantoprazole (PROTONIX) 20 MG EC tablet, Take 1 tablet by mouth Daily., Disp: 90 tablet, Rfl: 1  •  Spacer/Aero-Holding Chambers (AeroChamber Plus Santos-Vu) misc, , Disp: , Rfl:   •  amoxicillin (AMOXIL) 500 MG capsule, Take 1 capsule by mouth 2 (Two) Times a Day for 10 days., Disp: 20 capsule, Rfl: 0  •  predniSONE (DELTASONE) 20 MG tablet, Take 1 tablet by mouth Daily for 7 days., Disp: 7 tablet, Rfl: 0  Review of Systems   Constitutional: Positive for fatigue and fever. Negative for chills.   HENT: Positive for sinus pressure and sore throat. Negative for congestion, ear pain, hoarse voice, postnasal drip, rhinorrhea, trouble swallowing and voice change.    Respiratory: Positive for shortness of breath (he has had SOA since having bronchitis in November, he is a smoker and is scheduled w/u). Negative for cough and wheezing.    Cardiovascular: Negative for chest pain and palpitations.   Gastrointestinal: Negative for abdominal pain.   Allergic/Immunologic: Positive for environmental allergies.   Neurological: Positive for headaches. Negative for dizziness and light-headedness.   Hematological: Positive for adenopathy.       Objective   Physical Exam  Constitutional:       General: He is not in acute distress.     Appearance: He is not ill-appearing.   HENT:      Nose: No congestion.      Mouth/Throat:      Pharynx: Uvula midline. Posterior oropharyngeal erythema present. No oropharyngeal exudate.      Tonsils: No tonsillar exudate. 3+ on the right. 3+ on the left.      Comments: Tonsils are erythematous and edematous, w/o  exudate  Neurological:      Mental Status: He is alert.         Assessment & Plan   Diagnoses and all orders for this visit:    1. Tonsillitis (Primary)  -     amoxicillin (AMOXIL) 500 MG capsule; Take 1 capsule by mouth 2 (Two) Times a Day for 10 days.  Dispense: 20 capsule; Refill: 0  -     predniSONE (DELTASONE) 20 MG tablet; Take 1 tablet by mouth Daily for 7 days.  Dispense: 7 tablet; Refill: 0                    Follow Up:  If your symptoms are not resolving by the completion of your treatment or are worsening, see your primary care provider for follow up. If you don't have a primary care provider, you may go to any Urgent Care for re-evaluation. If you develop any life threatening symptoms, go to the nearest Emergency Department immediately or call EMS.               The use of  Video Visit was utilized during this visit, using both [employer] and [employer]. The use of a video visit has been reviewed with the patient and verbal informed consent has been obtained. No technical difficulties occurred during the visit.    is located at [address] [place], [place] [id number]  Provider is located at [place], [place]

## 2023-03-20 ENCOUNTER — HOSPITAL ENCOUNTER (OUTPATIENT)
Dept: PULMONOLOGY | Facility: HOSPITAL | Age: 33
Discharge: HOME | End: 2023-03-20
Admitting: FAMILY MEDICINE
Payer: COMMERCIAL

## 2023-03-20 DIAGNOSIS — R06.02 CHRONIC SHORTNESS OF BREATH: ICD-10-CM

## 2023-03-20 DIAGNOSIS — R06.2 WHEEZE: ICD-10-CM

## 2023-03-20 PROCEDURE — 94727 GAS DIL/WSHOT DETER LNG VOL: CPT

## 2023-03-20 PROCEDURE — 94729 DIFFUSING CAPACITY: CPT

## 2023-03-20 PROCEDURE — 94010 BREATHING CAPACITY TEST: CPT

## 2023-03-29 ENCOUNTER — OFFICE VISIT (OUTPATIENT)
Dept: FAMILY MEDICINE CLINIC | Facility: CLINIC | Age: 33
End: 2023-03-29
Payer: COMMERCIAL

## 2023-03-29 VITALS
WEIGHT: 255 LBS | HEART RATE: 90 BPM | DIASTOLIC BLOOD PRESSURE: 74 MMHG | TEMPERATURE: 98 F | HEIGHT: 71 IN | SYSTOLIC BLOOD PRESSURE: 132 MMHG | OXYGEN SATURATION: 99 % | BODY MASS INDEX: 35.7 KG/M2

## 2023-03-29 DIAGNOSIS — G43.C0 PERIODIC HEADACHE SYNDROME, NOT INTRACTABLE: Primary | ICD-10-CM

## 2023-03-29 PROCEDURE — 1160F RVW MEDS BY RX/DR IN RCRD: CPT | Performed by: FAMILY MEDICINE

## 2023-03-29 PROCEDURE — 3075F SYST BP GE 130 - 139MM HG: CPT | Performed by: FAMILY MEDICINE

## 2023-03-29 PROCEDURE — 99214 OFFICE O/P EST MOD 30 MIN: CPT | Performed by: FAMILY MEDICINE

## 2023-03-29 PROCEDURE — 1159F MED LIST DOCD IN RCRD: CPT | Performed by: FAMILY MEDICINE

## 2023-03-29 PROCEDURE — 3078F DIAST BP <80 MM HG: CPT | Performed by: FAMILY MEDICINE

## 2023-03-29 RX ORDER — SUMATRIPTAN 100 MG/1
TABLET, FILM COATED ORAL
Qty: 9 TABLET | Refills: 5 | Status: SHIPPED | OUTPATIENT
Start: 2023-03-29

## 2023-03-29 NOTE — ASSESSMENT & PLAN NOTE
Headaches are worsening.  Difficult to determine whether this is a migraine or sinus headache as patient was treated for sinusitis around the time that the headache was present and it resolved and has not come back since taking the antibiotics.  Advised to keep a headache diary.  Counseled regarding lifestyle modifications.  Medication changes per orders.  Patient will work on diet lifestyle modification.  Patient was started on Imitrex to use as needed.  He will follow-up at his next scheduled appointment.  RTC/ED precautions given.

## 2023-03-29 NOTE — PROGRESS NOTES
Rahul Walters is a 32 y.o. male who presents today for Headache      Patient has been having headaches described as a crushing pain in the frontal area. It started a week and half ago and has been coming and going. It is not as bad as it was a week ago. He states he took tylenol and ibuprofen did not relieve the pain. He did not get relief with eating or caffeine either. He has had headaches occasionally in the past which normally responded to the above measure. He states that this headache was different due to the pressure feeling and the location. He denies aura proceeding headaches. Normally his headache hurt all over his head. He had some photo and phonophobia. He has some mild nausea and mild dizziness with headaches. The head pressure lasted for a couple of days and the headache lasted about 24 hours. He has not had a headache in 2-3 days not. He did not have changes in his vision, CP, SOA, palpitations, fever, chills, body aches, congestion, ringing in ears, vomiting, or edema. He did a virtual visit and was treated for sinus infection and finished treatment a few days ago. He has not had a headache since. He did not check his blood pressure when he had the headache.        Review of Systems   Constitutional: Negative for fever and unexpected weight loss.   HENT: Negative for congestion, ear pain and sore throat.    Eyes: Positive for photophobia. Negative for visual disturbance.   Respiratory: Negative for cough, shortness of breath and wheezing.    Cardiovascular: Negative for chest pain and palpitations.   Gastrointestinal: Positive for nausea. Negative for abdominal pain, blood in stool, constipation, diarrhea, vomiting and GERD.   Endocrine: Negative for polydipsia and polyuria.   Genitourinary: Negative for difficulty urinating.   Musculoskeletal: Negative for joint swelling.   Skin: Negative for rash and skin lesions.   Allergic/Immunologic: Negative for environmental allergies.  "  Neurological: Positive for headache. Negative for dizziness, seizures, syncope and light-headedness.   Hematological: Does not bruise/bleed easily.   Psychiatric/Behavioral: Negative for suicidal ideas.        The following portions of the patient's history were reviewed and updated as appropriate: allergies, current medications, past family history, past medical history, past social history, past surgical history and problem list.    Current Outpatient Medications on File Prior to Visit   Medication Sig Dispense Refill   • albuterol sulfate  (90 Base) MCG/ACT inhaler Inhale 2 puffs Every 4 (Four) Hours As Needed for Wheezing. With spacer. 18 g 0   • lisinopril (PRINIVIL,ZESTRIL) 20 MG tablet Take 1 tablet by mouth Daily. 90 tablet 3   • pantoprazole (PROTONIX) 20 MG EC tablet Take 1 tablet by mouth Daily. 90 tablet 1   • Spacer/Aero-Holding Chambers (AeroChamber Plus Santos-Vu) misc        No current facility-administered medications on file prior to visit.       No Known Allergies     Visit Vitals  /74   Pulse 90   Temp 98 °F (36.7 °C)   Ht 180.3 cm (71\")   Wt 116 kg (255 lb)   SpO2 99%   BMI 35.57 kg/m²        Physical Exam  Constitutional:       General: He is not in acute distress.     Appearance: He is well-developed. He is not diaphoretic.   HENT:      Head: Atraumatic.      Right Ear: Tympanic membrane, ear canal and external ear normal. There is no impacted cerumen.      Left Ear: Tympanic membrane, ear canal and external ear normal. There is no impacted cerumen.      Nose: No congestion or rhinorrhea.      Right Sinus: No maxillary sinus tenderness or frontal sinus tenderness.      Left Sinus: No maxillary sinus tenderness or frontal sinus tenderness.      Mouth/Throat:      Mouth: Mucous membranes are moist.      Pharynx: Oropharynx is clear. No oropharyngeal exudate or posterior oropharyngeal erythema.   Eyes:      Extraocular Movements: Extraocular movements intact.      Pupils: Pupils are " equal, round, and reactive to light.   Cardiovascular:      Rate and Rhythm: Normal rate and regular rhythm.      Heart sounds: Normal heart sounds. No murmur heard.    No friction rub. No gallop.   Pulmonary:      Effort: Pulmonary effort is normal. No respiratory distress.      Breath sounds: Normal breath sounds. No stridor. No wheezing, rhonchi or rales.   Musculoskeletal:      Cervical back: Normal range of motion and neck supple.   Skin:     General: Skin is warm and dry.   Neurological:      Mental Status: He is alert and oriented to person, place, and time.   Psychiatric:         Behavior: Behavior normal.               Problems Addressed this Visit        Neuro    Periodic headache syndrome, not intractable - Primary     Headaches are worsening.  Difficult to determine whether this is a migraine or sinus headache as patient was treated for sinusitis around the time that the headache was present and it resolved and has not come back since taking the antibiotics.  Advised to keep a headache diary.  Counseled regarding lifestyle modifications.  Medication changes per orders.  Patient will work on diet lifestyle modification.  Patient was started on Imitrex to use as needed.  He will follow-up at his next scheduled appointment.  RTC/ED precautions given.             Relevant Medications    SUMAtriptan (IMITREX) 100 MG tablet   Diagnoses       Codes Comments    Periodic headache syndrome, not intractable    -  Primary ICD-10-CM: G43.C0  ICD-9-CM: 346.20           Return if symptoms worsen or fail to improve.    Boom Benoit MD   3/29/2023

## 2023-04-19 ENCOUNTER — TELEPHONE (OUTPATIENT)
Dept: FAMILY MEDICINE CLINIC | Facility: CLINIC | Age: 33
End: 2023-04-19
Payer: COMMERCIAL

## 2023-04-19 NOTE — TELEPHONE ENCOUNTER
Hub/front can read ----- Message from Boom Benoit MD sent at 4/18/2023  5:07 PM EDT -----  Please let the patient know that his pulmonary function test showed mild to moderate restriction.  There is no evidence of obstruction.  Due to the chronic shortness of breath and the findings on his pulmonary function test I recommend that he see a pulmonologist which is a lung doctor for further evaluation.  If the patient is agreeable with this referral please let me know and I will place the order.  If the patient has any questions please have him contact me.

## 2023-04-27 ENCOUNTER — TELEMEDICINE (OUTPATIENT)
Dept: FAMILY MEDICINE CLINIC | Facility: TELEHEALTH | Age: 33
End: 2023-04-27
Payer: COMMERCIAL

## 2023-04-27 DIAGNOSIS — J32.9 VIRAL SINUSITIS: Primary | ICD-10-CM

## 2023-04-27 DIAGNOSIS — B97.89 VIRAL SINUSITIS: Primary | ICD-10-CM

## 2023-04-27 RX ORDER — METHYLPREDNISOLONE 4 MG/1
TABLET ORAL
Qty: 1 EACH | Refills: 0 | Status: SHIPPED | OUTPATIENT
Start: 2023-04-27

## 2023-04-27 NOTE — PROGRESS NOTES
Chief Complaint   Patient presents with    Nasal Congestion    Sinusitis    Headache       Video Visit Reason:   Free Text Description: Possible sinus infection  Subjective   Rahul Walters is a 32 y.o. male.     History of Present Illness  Sinus pressure, headache and nasal congestion since last night. No fever, chills, body aches or cough.  Sinusitis  This is a new problem. The current episode started yesterday. The problem has been gradually worsening since onset. There has been no fever. Associated symptoms include congestion, headaches and sinus pressure. Pertinent negatives include no chills, coughing, ear pain, neck pain, shortness of breath, sore throat or swollen glands. Past treatments include acetaminophen. The treatment provided no relief.   Headache    The following portions of the patient's history were reviewed and updated as appropriate: allergies, current medications, past medical history, and problem list.      Past Medical History:   Diagnosis Date    Dental caries     GERD (gastroesophageal reflux disease)     Gingivitis     HTN (hypertension)     Obesity     Periodontal disease     Tobacco dependence      Social History     Socioeconomic History    Marital status:      Spouse name: Suni    Number of children: 2    Years of education: H.S.    Highest education level: High school graduate   Tobacco Use    Smoking status: Heavy Smoker     Packs/day: 1.50     Years: 12.00     Pack years: 18.00     Types: Cigarettes     Passive exposure: Never    Smokeless tobacco: Never   Vaping Use    Vaping Use: Never used   Substance and Sexual Activity    Alcohol use: No    Drug use: No    Sexual activity: Yes     Partners: Female     Birth control/protection: Tubal ligation     medication documentation: reviewed and updated with patient and   Current Outpatient Medications:     albuterol sulfate  (90 Base) MCG/ACT inhaler, Inhale 2 puffs Every 4 (Four) Hours As Needed for Wheezing.  With spacer., Disp: 18 g, Rfl: 0    lisinopril (PRINIVIL,ZESTRIL) 20 MG tablet, Take 1 tablet by mouth Daily., Disp: 90 tablet, Rfl: 3    pantoprazole (PROTONIX) 20 MG EC tablet, Take 1 tablet by mouth Daily., Disp: 90 tablet, Rfl: 1    Spacer/Aero-Holding Chambers (AeroChamber Plus Santos-Vu) misc, , Disp: , Rfl:     SUMAtriptan (IMITREX) 100 MG tablet, Take one tablet at onset of headache. May repeat dose one time in 2 hours if headache not relieved., Disp: 9 tablet, Rfl: 5    Chlorcyclizine-Pseudoephed 25-60 MG tablet, Take 1 tablet by mouth 2 (Two) Times a Day for 14 days., Disp: 28 tablet, Rfl: 0    methylPREDNISolone (MEDROL) 4 MG dose pack, Take as directed on package instructions., Disp: 1 each, Rfl: 0  Review of Systems   Constitutional:  Negative for activity change, appetite change, chills, fatigue and fever.   HENT:  Positive for congestion, postnasal drip and sinus pressure. Negative for ear pain, facial swelling, sinus pain, sore throat, trouble swallowing and voice change.    Respiratory:  Negative for cough, shortness of breath and wheezing.    Musculoskeletal:  Negative for myalgias and neck pain.   Neurological:  Positive for headaches. Negative for dizziness and light-headedness.     Objective   Physical Exam  Constitutional:       General: He is not in acute distress.     Appearance: He is not ill-appearing.   Pulmonary:      Effort: Pulmonary effort is normal.   Neurological:      Mental Status: He is alert.   Psychiatric:         Speech: Speech normal.       Assessment & Plan   Diagnoses and all orders for this visit:    1. Viral sinusitis (Primary)  -     methylPREDNISolone (MEDROL) 4 MG dose pack; Take as directed on package instructions.  Dispense: 1 each; Refill: 0  -     Chlorcyclizine-Pseudoephed 25-60 MG tablet; Take 1 tablet by mouth 2 (Two) Times a Day for 14 days.  Dispense: 28 tablet; Refill: 0                    Follow Up:  If your symptoms are not resolving by the completion of your  treatment or are worsening, see your primary care provider for follow up. If you don't have a primary care provider, you may go to any Urgent Care for re-evaluation. If you develop any life threatening symptoms, go to the nearest Emergency Department immediately or call EMS.               The use of  Video Visit was utilized during this visit, using both [employer] and [employer]. The use of a video visit has been reviewed with the patient and verbal informed consent has been obtained. No technical difficulties occurred during the visit.    is located at [address] [place] [id number]  Provider is located at [place], [place]

## 2023-05-02 ENCOUNTER — TELEMEDICINE (OUTPATIENT)
Dept: FAMILY MEDICINE CLINIC | Facility: TELEHEALTH | Age: 33
End: 2023-05-02
Payer: COMMERCIAL

## 2023-05-02 DIAGNOSIS — J02.9 ACUTE PHARYNGITIS, UNSPECIFIED ETIOLOGY: Primary | ICD-10-CM

## 2023-05-02 RX ORDER — AMOXICILLIN 875 MG/1
875 TABLET, COATED ORAL 2 TIMES DAILY
Qty: 20 TABLET | Refills: 0 | Status: SHIPPED | OUTPATIENT
Start: 2023-05-02 | End: 2023-05-12

## 2023-05-02 NOTE — PROGRESS NOTES
You have chosen to receive care through a telehealth visit.  Do you consent to use a video/audio connection for your medical care today? Yes     CHIEF COMPLAINT  Chief Complaint   Patient presents with    Sore Throat         HPI  Rahul Walters is a 32 y.o. male  presents with complaint of sore throat.  He states that about a week ago he had a sinus infection.  He states that he woke up today and his throat is sore and red.    Review of Systems   HENT:  Positive for sore throat.    All other systems reviewed and are negative.    Past Medical History:   Diagnosis Date    Dental caries     GERD (gastroesophageal reflux disease)     Gingivitis     HTN (hypertension)     Obesity     Periodontal disease     Tobacco dependence        Family History   Problem Relation Age of Onset    Heart attack Mother     Coronary artery disease Mother     Stroke Father     Hypertension Father     No Known Problems Sister     No Known Problems Brother     Cancer Maternal Grandmother     Heart disease Maternal Grandfather     Hypertension Paternal Grandmother     Heart attack Paternal Grandmother     Coronary artery disease Paternal Grandmother     Alcohol abuse Paternal Grandfather     No Known Problems Daughter     No Known Problems Son        Social History     Socioeconomic History    Marital status:      Spouse name: Suni    Number of children: 2    Years of education: H.S.    Highest education level: High school graduate   Tobacco Use    Smoking status: Heavy Smoker     Packs/day: 1.50     Years: 12.00     Pack years: 18.00     Types: Cigarettes     Passive exposure: Never    Smokeless tobacco: Never   Vaping Use    Vaping Use: Never used   Substance and Sexual Activity    Alcohol use: No    Drug use: No    Sexual activity: Yes     Partners: Female     Birth control/protection: Tubal ligation       Rahul Walters  reports that he has been smoking cigarettes. He has a 18.00 pack-year smoking history. He  has never been exposed to tobacco smoke. He has never used smokeless tobacco.. I have educated him on the risk of diseases from using tobacco products such as cancer, COPD, and heart disease.     I advised him to quit and he is not willing to quit.    I spent 3  minutes counseling the patient.              There were no vitals taken for this visit.    PHYSICAL EXAM  Physical Exam   Constitutional: He appears well-developed and well-nourished.   HENT:   Head: Normocephalic.   Mouth/Throat: Oropharyngeal exudate present.   Eyes: Pupils are equal, round, and reactive to light.   Pulmonary/Chest: Effort normal.   Musculoskeletal: Normal range of motion.   Neurological: He is alert.   Psychiatric: He has a normal mood and affect.     Results for orders placed or performed in visit on 08/19/22   Gastrointestinal Panel, PCR - Stool, Per Rectum    Specimen: Per Rectum; Stool   Result Value Ref Range    Campylobacter Not Detected Not Detected    Plesiomonas shigelloides Not Detected Not Detected    Salmonella Not Detected Not Detected    Vibrio Not Detected Not Detected    Vibrio cholerae Not Detected Not Detected    Yersinia enterocolitica Not Detected Not Detected    Enteroaggregative E. coli (EAEC) Not Detected Not Detected    Enteropathogenic E. coli (EPEC) Not Detected Not Detected    Enterotoxigenic E. coli (ETEC) lt/st Not Detected Not Detected    Shiga-like toxin-producing E. coli (STEC) stx1/stx2 Not Detected Not Detected    Shigella/Enteroinvasive E. coli (EIEC) Not Detected Not Detected    Cryptosporidium Not Detected Not Detected    Cyclospora cayetanensis Not Detected Not Detected    Entamoeba histolytica Not Detected Not Detected    Giardia lamblia Not Detected Not Detected    Adenovirus F40/41 Not Detected Not Detected    Astrovirus Not Detected Not Detected    Norovirus GI/GII Not Detected Not Detected    Rotavirus A Not Detected Not Detected    Sapovirus (I, II, IV or V) Not Detected Not Detected    Clostridioides difficile Toxin, PCR - Stool, Per Rectum    Specimen: Per Rectum; Stool   Result Value Ref Range    C. Difficile Toxins by PCR Not Detected Not Detected       Diagnoses and all orders for this visit:    1. Acute pharyngitis, unspecified etiology (Primary)  -     amoxicillin (AMOXIL) 875 MG tablet; Take 1 tablet by mouth 2 (Two) Times a Day for 10 days.  Dispense: 20 tablet; Refill: 0          FOLLOW-UP  As discussed during visit with PCP/[facility] if no improvement or Urgent Care/Emergency Department if worsening of symptoms    Patient verbalizes understanding of medication dosage, comfort measures, instructions for treatment and follow-up.    Radha Hazel, APRN  05/02/2023  06:38 EDT    The use of a video visit has been reviewed with the patient and verbal informed consent has been obtained. Myself and Rahul Walters participated in this visit. The patient is located in [address] [place] [address].    I am located in [place], [place]. [employer] and [employer] were utilized. I spent 20 minutes in the patient's chart for this visit.

## 2023-05-08 ENCOUNTER — TELEPHONE (OUTPATIENT)
Dept: FAMILY MEDICINE CLINIC | Facility: CLINIC | Age: 33
End: 2023-05-08

## 2023-05-08 NOTE — TELEPHONE ENCOUNTER
PATIENT CALLED BACK AND ASKED TO GO AHEAD AND PUT IN THE REFERRAL FOR PULMONOLOGY.    PHONE: [phone]

## 2023-05-11 DIAGNOSIS — R06.02 CHRONIC SHORTNESS OF BREATH: Primary | ICD-10-CM

## 2023-05-31 ENCOUNTER — OFFICE VISIT (OUTPATIENT)
Dept: PULMONOLOGY | Facility: CLINIC | Age: 33
End: 2023-05-31

## 2023-05-31 VITALS
WEIGHT: 256 LBS | OXYGEN SATURATION: 96 % | SYSTOLIC BLOOD PRESSURE: 142 MMHG | BODY MASS INDEX: 35.84 KG/M2 | DIASTOLIC BLOOD PRESSURE: 90 MMHG | HEART RATE: 82 BPM | TEMPERATURE: 97.8 F | HEIGHT: 71 IN | RESPIRATION RATE: 20 BRPM

## 2023-05-31 DIAGNOSIS — F17.200 TOBACCO DEPENDENCE: ICD-10-CM

## 2023-05-31 DIAGNOSIS — R06.02 CHRONIC SHORTNESS OF BREATH: ICD-10-CM

## 2023-05-31 DIAGNOSIS — K21.9 CHRONIC GERD: ICD-10-CM

## 2023-05-31 DIAGNOSIS — R05.3 CHRONIC COUGH: Primary | ICD-10-CM

## 2023-05-31 NOTE — PROGRESS NOTES
"  PULMONARY  NOTE    Chief Complaint     Chronic cough, chronic shortness of breath, tobacco abuse, chronic reflux    History of Present Illness     32-year-old male referred for dyspnea    He has shortness of breath  This occurs both with exertion and at rest  He feels that his shortness of breath came on after he experienced acute bronchitis in November 2023    His shortness of breath varies from day-to-day  Sometimes he is short of breath with exertion other times he is not  Sometimes he feels short of breath just sitting at rest    He has been prescribed an albuterol inhaler which he uses periodically.  He thinks it \"usually helps\"    He is being evaluated by Dr. Roy, cardiology  He has had a cardiac monitor and apparently an echocardiogram  He has a follow-up with Dr. Gore this month    He has a chronic cough  This occurs both during the day and sometimes at night  He is never had hemoptysis    He has chronic reflux  Does not follow reflux precautions  He is to take pantoprazole on a daily basis to try and help control reflux symptoms    Patient Active Problem List   Diagnosis   • HTN (hypertension)   • Tobacco dependence   • Dizziness of unknown cause   • Dental abscess   • Thrush, oral   • Well adult exam   • Diarrhea   • Nausea   • Environmental and seasonal allergies   • Chronic shortness of breath   • Wheeze   • Pain in both testicles   • Scrotum swelling   • Blood in semen   • Periodic headache syndrome, not intractable   • Chronic cough   • Chronic GERD     No Known Allergies    Current Outpatient Medications:   •  albuterol sulfate  (90 Base) MCG/ACT inhaler, Inhale 2 puffs Every 4 (Four) Hours As Needed for Wheezing. With spacer., Disp: 18 g, Rfl: 0  •  lisinopril (PRINIVIL,ZESTRIL) 20 MG tablet, Take 1 tablet by mouth Daily., Disp: 90 tablet, Rfl: 3  •  pantoprazole (PROTONIX) 20 MG EC tablet, Take 1 tablet by mouth Daily., Disp: 90 tablet, Rfl: 1  •  Spacer/Aero-Holding Chambers (AeroChamber " "Plus Santos-Vu) misc, , Disp: , Rfl:   •  SUMAtriptan (IMITREX) 100 MG tablet, Take one tablet at onset of headache. May repeat dose one time in 2 hours if headache not relieved., Disp: 9 tablet, Rfl: 5  MEDICATION LIST AND ALLERGIES REVIEWED.    Family History   Problem Relation Age of Onset   • Heart attack Mother    • Coronary artery disease Mother    • Stroke Father    • Hypertension Father    • No Known Problems Sister    • No Known Problems Brother    • Cancer Maternal Grandmother    • Heart disease Maternal Grandfather    • Hypertension Paternal Grandmother    • Heart attack Paternal Grandmother    • Coronary artery disease Paternal Grandmother    • Alcohol abuse Paternal Grandfather    • No Known Problems Daughter    • No Known Problems Son      Social History     Tobacco Use   • Smoking status: Heavy Smoker     Packs/day: 1.50     Years: 12.00     Pack years: 18.00     Types: Cigarettes     Passive exposure: Never   • Smokeless tobacco: Never   Vaping Use   • Vaping Use: Former   Substance Use Topics   • Alcohol use: No   • Drug use: No     Social History     Social History Narrative        Ongoing tobacco abuse    Would like to stop smoking     FAMILY AND SOCIAL HISTORY REVIEWED.    Review of Systems  IF PRESENT REFER TO SCANNED ROS SHEET FROM SAME DATE  OTHERWISE ROS OBTAINED AND NON-CONTRIBUTORY OVER HPI.    /90   Pulse 82   Temp 97.8 °F (36.6 °C)   Resp 20   Ht 180.3 cm (71\")   Wt 116 kg (256 lb)   SpO2 96%   BMI 35.70 kg/m²   Physical Exam  Vitals and nursing note reviewed.   Constitutional:       General: He is not in acute distress.     Appearance: He is well-developed. He is not diaphoretic.   HENT:      Head: Normocephalic and atraumatic.   Neck:      Thyroid: No thyromegaly.   Cardiovascular:      Rate and Rhythm: Normal rate and regular rhythm.      Heart sounds: Normal heart sounds. No murmur heard.  Pulmonary:      Effort: Pulmonary effort is normal.      Breath sounds: Normal " breath sounds. No stridor.   Lymphadenopathy:      Cervical: No cervical adenopathy.      Upper Body:      Right upper body: No supraclavicular or epitrochlear adenopathy.      Left upper body: No supraclavicular or epitrochlear adenopathy.   Skin:     General: Skin is warm and dry.   Neurological:      Mental Status: He is alert and oriented to person, place, and time.   Psychiatric:         Behavior: Behavior normal.         Results     Chest x-ray from 2/23/2023 reviewed on PACS  No effusions, infiltrates, or consolidation    Pulmonary function test done at [facility] on 3/20/2023 reviewed  No airway obstruction, no restriction by spirometry or single breath alveolar volume.  Normal diffusion capacity  TLC is incorrect and artifactually low    Immunization History   Administered Date(s) Administered   • COVID-19 (PFIZER) Purple Cap Monovalent 04/14/2021   • Flu Vaccine Quad PF >36MO 10/11/2019, 10/31/2020   • FluLaval/Fluzone >6mos 10/11/2019   • Hepatitis A 11/01/2018   • Pneumococcal Polysaccharide (PPSV23) 10/11/2019   • Tdap 10/11/2019     Problem List       ICD-10-CM ICD-9-CM   1. Chronic cough  R05.3 786.2   2. Chronic GERD  K21.9 530.81   3. Chronic shortness of breath  R06.02 786.05   4. Tobacco dependence  F17.200 305.1       Discussion     We reviewed his test results  Chest x-ray, exam, and PFTs are unremarkable/normal  The TLC obtained at the hospital is incorrect due to a leak in the system.  He has no evidence of a restrictive defect either by spirometry or by single breath alveolar volume.    He is being evaluated by Dr. Roy, cardiology  If his cardiac evaluation is unremarkable then we can consider a cardiopulmonary exercise test    Reflux very well may be a contributing factor to his cough  Smoking probably as well  We discussed smoking cessation  He has Chantix which he plans to start and work towards a smoking cessation date    We discussed reflux precautions I gave him a reflux  information sheet    I will plan to see him back in 1 to 2 months and we will decide at that time whether we should be pursuing a cardiopulmonary exercise test    Moderate level of Medical Decision Making complexity based on 1 undiagnosed new problem or 2 stable chronic conditions, independent interpretation of tests, and/or prescription drug management     Vinny Chavez MD  Note electronically signed    CC: Boom Benoit MD

## 2023-06-06 ENCOUNTER — TELEMEDICINE (OUTPATIENT)
Dept: FAMILY MEDICINE CLINIC | Facility: CLINIC | Age: 33
End: 2023-06-06
Payer: COMMERCIAL

## 2023-06-06 DIAGNOSIS — G43.C0 PERIODIC HEADACHE SYNDROME, NOT INTRACTABLE: ICD-10-CM

## 2023-06-06 DIAGNOSIS — R42 DIZZINESS OF UNKNOWN CAUSE: Primary | ICD-10-CM

## 2023-06-06 DIAGNOSIS — R56.9 SEIZURE-LIKE ACTIVITY: ICD-10-CM

## 2023-06-06 PROCEDURE — 99213 OFFICE O/P EST LOW 20 MIN: CPT | Performed by: FAMILY MEDICINE

## 2023-06-06 PROCEDURE — 1159F MED LIST DOCD IN RCRD: CPT | Performed by: FAMILY MEDICINE

## 2023-06-06 PROCEDURE — 1160F RVW MEDS BY RX/DR IN RCRD: CPT | Performed by: FAMILY MEDICINE

## 2023-06-06 NOTE — PROGRESS NOTES
Rahul Walters is a 32 y.o. male who presents today for Dizziness    You have chosen to receive care through a telemedicine visit. Do you consent to use a telemedicine visit for your medical care today? Yes.  Patient is in his vehicle in [place] and I am in my office in [place].      Patient has had chronic periodic episodes of dizzy spells and feeling like he can't control his legs. He has not had seizure or LOC but does have headaches with the episodes. He does have slurred speech during these episodes. The episodes last anywhere from a couple of minutes to 10-15 minutes. Sometimes patient feels back to normal after episode and sometimes he he feels very fatigued and mentally foggy. He has a couple of episodes a week. They have not worsened in frequency or severity. 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 The second one is indeterminate.        Review of Systems   Constitutional:  Negative for fever and unexpected weight loss.   HENT:  Negative for congestion, ear pain and sore throat.    Eyes:  Negative for visual disturbance.   Respiratory:  Negative for cough, shortness of breath and wheezing.    Cardiovascular:  Negative for chest pain and palpitations.   Gastrointestinal:  Negative for abdominal pain, blood in stool, constipation, diarrhea, nausea, vomiting and GERD.   Endocrine: Negative for polydipsia and polyuria.   Genitourinary:  Negative for difficulty urinating.   Musculoskeletal:  Negative for joint swelling.   Skin:  Negative for rash and skin lesions.   Allergic/Immunologic: Negative for environmental allergies.   Neurological:  Positive for dizziness, tremors, speech difficulty and headache. Negative for seizures and syncope.   Hematological:  Does not bruise/bleed easily.   Psychiatric/Behavioral:  Negative for suicidal ideas.       The following portions of the patient's history were reviewed and updated as appropriate: allergies,  current medications, past family history, past medical history, past social history, past surgical history and problem list.    Current Outpatient Medications on File Prior to Visit   Medication Sig Dispense Refill    albuterol sulfate  (90 Base) MCG/ACT inhaler Inhale 2 puffs Every 4 (Four) Hours As Needed for Wheezing. With spacer. 18 g 0    lisinopril (PRINIVIL,ZESTRIL) 20 MG tablet Take 1 tablet by mouth Daily. 90 tablet 3    pantoprazole (PROTONIX) 20 MG EC tablet Take 1 tablet by mouth Daily. 90 tablet 1    Spacer/Aero-Holding Chambers (AeroChamber Plus Santos-Vu) misc       SUMAtriptan (IMITREX) 100 MG tablet Take one tablet at onset of headache. May repeat dose one time in 2 hours if headache not relieved. 9 tablet 5     No current facility-administered medications on file prior to visit.       No Known Allergies     There were no vitals taken for this visit.     Physical Exam  Constitutional:       General: He is not in acute distress.     Appearance: Normal appearance. He is not ill-appearing, toxic-appearing or diaphoretic.   Pulmonary:      Effort: Pulmonary effort is normal. No respiratory distress.   Neurological:      General: No focal deficit present.      Mental Status: He is alert. Mental status is at baseline.   Psychiatric:         Mood and Affect: Mood normal.         Behavior: Behavior normal.             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  Could also possibly secondary to  migraine or seizure-like activity.  Patient does report some fatigue and mental fogginess after these episodes.  The episodes have not been worsening in frequency or severity.  Patient was given referral to neurology for further evaluation and treatment.  RTC/ED precautions given.         Relevant Orders    Ambulatory Referral to Neurology     Diagnoses         Codes Comments    Dizziness of unknown cause    -  Primary ICD-10-CM: R42  ICD-9-CM: 780.4     Periodic headache syndrome, not intractable     ICD-10-CM: G43.C0  ICD-9-CM: 346.20     Seizure-like activity     ICD-10-CM: R56.9  ICD-9-CM: 780.39             Return in about 3 months (around 9/6/2023) for Annual.    This was an audio and video enabled telemedicine encounter.    20 minutes was spent on this patient encounter which included history taking, physical exam, answering patient questions, counseling, discussing treatment plan, placing orders, and documentation.    Boom Benoit MD   6/6/2023

## 2023-06-06 NOTE — ASSESSMENT & PLAN NOTE
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  Could also possibly secondary to migraine or seizure-like activity.  Patient does report some fatigue and mental fogginess after these episodes.  The episodes have not been worsening in frequency or severity.  Patient was given referral to neurology for further evaluation and treatment.  RTC/ED precautions given.

## 2023-08-29 DIAGNOSIS — K21.9 GASTROESOPHAGEAL REFLUX DISEASE, UNSPECIFIED WHETHER ESOPHAGITIS PRESENT: ICD-10-CM

## 2023-08-30 RX ORDER — PANTOPRAZOLE SODIUM 20 MG/1
TABLET, DELAYED RELEASE ORAL
Qty: 90 TABLET | Refills: 1 | Status: SHIPPED | OUTPATIENT
Start: 2023-08-30

## 2023-09-01 ENCOUNTER — TELEMEDICINE (OUTPATIENT)
Dept: FAMILY MEDICINE CLINIC | Facility: TELEHEALTH | Age: 33
End: 2023-09-01
Payer: COMMERCIAL

## 2023-09-01 DIAGNOSIS — J06.9 UPPER RESPIRATORY TRACT INFECTION, UNSPECIFIED TYPE: Primary | ICD-10-CM

## 2023-09-01 RX ORDER — AZITHROMYCIN 250 MG/1
TABLET, FILM COATED ORAL
Qty: 6 TABLET | Refills: 0 | Status: SHIPPED | OUTPATIENT
Start: 2023-09-01

## 2023-09-01 NOTE — PROGRESS NOTES
Subjective   Rahul Walters is a 33 y.o. male.     History of Present Illness  He has a sore throat, throat feels swollen, temperature of 99.8 and mild pressure around his nose. His symptoms started 3-4 days ago.  His wife had recently gotten a sinus infection. He is seeing white spots on the back of his throat and his tonsilar lymph nodes are swollen. He was recently on augmentin.     The following portions of the patient's history were reviewed and updated as appropriate: allergies, current medications, past family history, past medical history, past social history, past surgical history, and problem list.    Review of Systems   Constitutional:  Negative for fever.   HENT:  Positive for sinus pressure and sore throat (hurts when he coughs or is eating). Negative for congestion, ear pain and rhinorrhea.    Respiratory:  Positive for cough.    Gastrointestinal: Negative.    Musculoskeletal:  Negative for myalgias (has resolved).   Skin: Negative.    Neurological:  Negative for headache (has resolved).     Objective   Physical Exam  Constitutional:       General: He is not in acute distress.     Appearance: He is well-developed. He is not diaphoretic.   Pulmonary:      Effort: Pulmonary effort is normal.   Neurological:      Mental Status: He is alert and oriented to person, place, and time.   Psychiatric:         Behavior: Behavior normal.         Assessment & Plan   Diagnoses and all orders for this visit:    1. Upper respiratory tract infection, unspecified type (Primary)  -     azithromycin (Zithromax Z-Rashid) 250 MG tablet; Take 2 tablets by mouth on day 1, then 1 tablet daily on days 2-5  Dispense: 6 tablet; Refill: 0                 The use of a video visit has been reviewed with the patient and verbal informed consent has been obtained. Myself and Rahul Walters participated in this visit. The patient is located in  [place], [place] at home . I am located in [place], [place]. [employer]  Video Client were utilized. I spent 20 minutes in the patient's chart for this visit.

## 2023-09-15 ENCOUNTER — OFFICE VISIT (OUTPATIENT)
Dept: FAMILY MEDICINE CLINIC | Facility: CLINIC | Age: 33
End: 2023-09-15
Payer: COMMERCIAL

## 2023-09-15 VITALS
SYSTOLIC BLOOD PRESSURE: 126 MMHG | TEMPERATURE: 97.8 F | HEART RATE: 82 BPM | DIASTOLIC BLOOD PRESSURE: 80 MMHG | OXYGEN SATURATION: 96 % | WEIGHT: 256 LBS | HEIGHT: 71 IN | BODY MASS INDEX: 35.84 KG/M2

## 2023-09-15 DIAGNOSIS — R59.0 SUBMENTAL ADENOPATHY: Primary | ICD-10-CM

## 2023-09-15 DIAGNOSIS — H69.92 EUSTACHIAN TUBE DISORDER, LEFT: ICD-10-CM

## 2023-09-15 PROCEDURE — 3074F SYST BP LT 130 MM HG: CPT | Performed by: PHYSICIAN ASSISTANT

## 2023-09-15 PROCEDURE — 99213 OFFICE O/P EST LOW 20 MIN: CPT | Performed by: PHYSICIAN ASSISTANT

## 2023-09-15 PROCEDURE — 3079F DIAST BP 80-89 MM HG: CPT | Performed by: PHYSICIAN ASSISTANT

## 2023-09-15 PROCEDURE — 1160F RVW MEDS BY RX/DR IN RCRD: CPT | Performed by: PHYSICIAN ASSISTANT

## 2023-09-15 PROCEDURE — 1159F MED LIST DOCD IN RCRD: CPT | Performed by: PHYSICIAN ASSISTANT

## 2023-09-15 RX ORDER — FLUTICASONE PROPIONATE 50 MCG
SPRAY, SUSPENSION (ML) NASAL
COMMUNITY
Start: 2023-09-05

## 2023-09-15 RX ORDER — DILTIAZEM HYDROCHLORIDE 60 MG/1
TABLET, FILM COATED ORAL
COMMUNITY
Start: 2023-09-06

## 2023-09-15 NOTE — PROGRESS NOTES
Follow Up Office Visit      Date: 09/15/2023   Patient Name: Rahul Walters  : 1990   MRN: [id number]     Chief Complaint:    Chief Complaint   Patient presents with    Facial Swelling     Under the patient chin. It has been on and off for the past year        History of Present Illness: Rahul Walters is a 33 y.o. male who is here today to follow up with swelling under his chin.    The edema has been present for several months. When he brought it up at his last appointment he was informed he could possibly have a stone and could try sucking on something sour to help release it, but it keeps coming back. It does not get sore or painful. He describes it as feeling as though he is being choked. He denies any trouble with eating or drinking. The edema with last for a few days and then go away for a few weeks. He denies any fevers. He denies any medication changes. He had edema last night, but he has not noticed any this morning. It is slightly tender to touch.     The patient was recently on antibiotics. He was sick a couple of weeks ago and experienced dizziness upon waking. He went back to sleep and was fine when he woke up again. About every other day he is experiencing a headache that starts behind his ear and radiates down into his neck. He usually gets relief after drinking caffeine and taking Tylenol. When he was ill, it was possibly a sinus infection. He had taken a Z-Rashid for it.     He saw an allergist provider a few weeks ago and was started on an inhaler and a nasal spray.     Subjective      Review of systems:  Review of Systems   Constitutional:  Negative for fatigue and fever.   HENT:  Negative for trouble swallowing.    Eyes:  Negative for visual disturbance.   Respiratory:  Negative for cough and shortness of breath.    Cardiovascular:  Negative for chest pain and leg swelling.   Gastrointestinal:  Negative for abdominal pain.      I have reviewed and the following  "portions of the patient's history were updated as appropriate: past family history, past medical history, past social history, past surgical history and problem list.    Medications:     Current Outpatient Medications:     albuterol sulfate  (90 Base) MCG/ACT inhaler, Inhale 2 puffs Every 4 (Four) Hours As Needed for Wheezing or Shortness of Air., Disp: 18 g, Rfl: 0    diphenhydrAMINE (BENADRYL) 25 mg capsule, Take 1 capsule by mouth Every 6 (Six) Hours As Needed for Allergies., Disp: 42 capsule, Rfl: 0    famotidine (PEPCID) 40 MG tablet, Take 1 tablet by mouth Daily., Disp: 14 tablet, Rfl: 0    fluticasone (FLONASE) 50 MCG/ACT nasal spray, , Disp: , Rfl:     lisinopril (PRINIVIL,ZESTRIL) 20 MG tablet, Take 1 tablet by mouth Daily., Disp: 90 tablet, Rfl: 3    pantoprazole (PROTONIX) 20 MG EC tablet, TAKE ONE TABLET BY MOUTH DAILY, Disp: 90 tablet, Rfl: 1    Spacer/Aero-Holding Chambers (AeroChamber Plus Santos-Vu) misc, , Disp: , Rfl:     SUMAtriptan (IMITREX) 100 MG tablet, Take one tablet at onset of headache. May repeat dose one time in 2 hours if headache not relieved., Disp: 9 tablet, Rfl: 5    Symbicort 80-4.5 MCG/ACT inhaler, , Disp: , Rfl:     Allergies:   No Known Allergies    Objective     Vital Signs:   Vitals:    09/15/23 0731   BP: 126/80   Pulse: 82   Temp: 97.8 °F (36.6 °C)   TempSrc: Infrared   SpO2: 96%   Weight: 116 kg (256 lb)   Height: 180.3 cm (71\")   PainSc: 0-No pain     Body mass index is 35.7 kg/m².   Class 2 Severe Obesity (BMI >=35 and <=39.9). Obesity-related health conditions include the following: hypertension. Obesity is unchanged. BMI is is above average; BMI management plan is completed. We discussed low calorie, low carb based diet program, portion control, and increasing exercise.      Physical Exam:   Physical Exam  Vitals and nursing note reviewed.   Constitutional:       Appearance: Normal appearance.   HENT:      Head: Normocephalic and atraumatic.      Right Ear: Tympanic " membrane and ear canal normal. A middle ear effusion is present.      Left Ear: Tympanic membrane and ear canal normal. A middle ear effusion is present.      Nose: No congestion or rhinorrhea.      Mouth/Throat:      Mouth: Mucous membranes are moist.      Pharynx: Oropharynx is clear. No posterior oropharyngeal erythema.   Neck:        Comments: Smooth, mobile nodule submental area  Cardiovascular:      Rate and Rhythm: Normal rate and regular rhythm.   Pulmonary:      Effort: Pulmonary effort is normal.      Breath sounds: Normal breath sounds. No decreased breath sounds, wheezing, rhonchi or rales.   Musculoskeletal:      Cervical back: Neck supple.      Right lower leg: No edema.      Left lower leg: No edema.   Lymphadenopathy:      Cervical: No cervical adenopathy.   Neurological:      Mental Status: He is alert.        Procedures     Assessment / Plan      Assessment/Plan:   Diagnoses and all orders for this visit:    1. Submental adenopathy (Primary)  -     Ambulatory Referral to ENT (Otolaryngology)    2. Eustachian tube disorder, left       Discussed that the area under his chin that is swelling could be a salivary gland or a lymph node. Less likely I think would be a sebaceous cyst since it does come and go so quickly. We will refer to ear, nose, and throat for further evaluation of this ongoing issue. It also sounds like he has had some eustachian tube dysfunction and vertigo, which the ENT can also evaluate as well, but he was just recently put on some allergy medication and Flonase. I encouraged him to continue this as this should help as well.     Follow Up:   No follow-ups on file.    Chary Garrido PA-C   [facility]    Transcribed from ambient dictation for Chary Garrido PA-C by Abimbola Lopez.  09/15/23   09:27 EDT    Patient or patient representative verbalized consent to the visit recording.  I have personally performed the services described in this document as  transcribed by the above individual, and it is both accurate and complete.

## 2023-11-02 ENCOUNTER — TELEMEDICINE (OUTPATIENT)
Dept: FAMILY MEDICINE CLINIC | Facility: TELEHEALTH | Age: 33
End: 2023-11-02
Payer: COMMERCIAL

## 2023-11-02 DIAGNOSIS — B97.89 VIRAL SINUSITIS: Primary | ICD-10-CM

## 2023-11-02 DIAGNOSIS — J32.9 VIRAL SINUSITIS: Primary | ICD-10-CM

## 2023-11-02 RX ORDER — COVID-19 ANTIGEN TEST
1 KIT MISCELLANEOUS AS NEEDED
Qty: 1 KIT | Refills: 0 | Status: SHIPPED | OUTPATIENT
Start: 2023-11-02

## 2023-11-02 RX ORDER — PREDNISONE 10 MG/1
TABLET ORAL DAILY
Qty: 21 EACH | Refills: 0 | Status: SHIPPED | OUTPATIENT
Start: 2023-11-02 | End: 2023-11-08

## 2023-11-02 RX ORDER — FLUTICASONE PROPIONATE 50 MCG
2 SPRAY, SUSPENSION (ML) NASAL DAILY
Qty: 16 ML | Refills: 0 | Status: SHIPPED | OUTPATIENT
Start: 2023-11-02

## 2023-11-02 NOTE — PROGRESS NOTES
Subjective   Chief Complaint   Patient presents with    Sinusitis       Rahul Walters is a 33 y.o. male.     History of Present Illness  Patient reports congestion, sinus pressure, mild sore throat that started yesterday.  Symptoms are worse today.  He feels hot but has not had a measured temperature.  He thinks he has a sinus infection.  Denies sick contacts.  Sinusitis  This is a new problem. The current episode started yesterday. The problem is unchanged. There has been no fever. Associated symptoms include congestion, sinus pressure and a sore throat. Pertinent negatives include no chills, coughing, diaphoresis, ear pain, headaches, hoarse voice, neck pain, shortness of breath, sneezing or swollen glands.        No Known Allergies    Past Medical History:   Diagnosis Date    Dental caries     GERD (gastroesophageal reflux disease)     Gingivitis     HTN (hypertension)     Hypertension     Obesity     Periodontal disease     Tobacco dependence        Past Surgical History:   Procedure Laterality Date    TOOTH EXTRACTION  09/28/2022    WISDOM TOOTH EXTRACTION  2017       Social History     Socioeconomic History    Marital status:      Spouse name: Suni    Number of children: 2    Years of education: H.S.    Highest education level: High school graduate   Tobacco Use    Smoking status: Heavy Smoker     Packs/day: 1.50     Years: 12.00     Additional pack years: 0.00     Total pack years: 18.00     Types: Cigarettes     Passive exposure: Never    Smokeless tobacco: Never   Vaping Use    Vaping Use: Former   Substance and Sexual Activity    Alcohol use: No    Drug use: No    Sexual activity: Yes     Partners: Female     Birth control/protection: Tubal ligation       Family History   Problem Relation Age of Onset    Heart attack Mother     Coronary artery disease Mother     Stroke Father     Hypertension Father     No Known Problems Sister     No Known Problems Brother     Cancer Maternal  Grandmother     Heart disease Maternal Grandfather     Hypertension Paternal Grandmother     Heart attack Paternal Grandmother     Coronary artery disease Paternal Grandmother     Alcohol abuse Paternal Grandfather     No Known Problems Daughter     No Known Problems Son          Current Outpatient Medications:     albuterol sulfate  (90 Base) MCG/ACT inhaler, Inhale 2 puffs Every 4 (Four) Hours As Needed for Wheezing or Shortness of Air., Disp: 18 g, Rfl: 0    COVID-19 At Home Antigen Test (BinaxNOW COVID-19 Ag Home Test) kit, 1 each into the nostril(s) as directed by provider As Needed (COVID symptoms)., Disp: 1 kit, Rfl: 0    diphenhydrAMINE (BENADRYL) 25 mg capsule, Take 1 capsule by mouth Every 6 (Six) Hours As Needed for Allergies., Disp: 42 capsule, Rfl: 0    famotidine (PEPCID) 40 MG tablet, Take 1 tablet by mouth Daily., Disp: 14 tablet, Rfl: 0    fluticasone (FLONASE) 50 MCG/ACT nasal spray, 2 sprays into the nostril(s) as directed by provider Daily., Disp: 16 mL, Rfl: 0    lisinopril (PRINIVIL,ZESTRIL) 20 MG tablet, Take 1 tablet by mouth Daily., Disp: 90 tablet, Rfl: 3    pantoprazole (PROTONIX) 20 MG EC tablet, TAKE ONE TABLET BY MOUTH DAILY, Disp: 90 tablet, Rfl: 1    predniSONE (DELTASONE) 10 MG (21) dose pack, Take  by mouth Daily for 6 days., Disp: 21 each, Rfl: 0    Spacer/Aero-Holding Chambers (AeroChamber Plus Santos-Vu) misc, , Disp: , Rfl:     SUMAtriptan (IMITREX) 100 MG tablet, Take one tablet at onset of headache. May repeat dose one time in 2 hours if headache not relieved., Disp: 9 tablet, Rfl: 5    Symbicort 80-4.5 MCG/ACT inhaler, , Disp: , Rfl:       Review of Systems   Constitutional:  Positive for fatigue. Negative for chills and diaphoresis.   HENT:  Positive for congestion, sinus pressure and sore throat. Negative for ear pain, hoarse voice, sneezing and swollen glands.    Respiratory:  Negative for cough, chest tightness, shortness of breath and wheezing.    Gastrointestinal:  Negative.    Musculoskeletal:  Negative for myalgias and neck pain.   Neurological:  Positive for headache.        There were no vitals filed for this visit.    Objective   Physical Exam  Constitutional:       General: He is not in acute distress.     Appearance: Normal appearance. He is not ill-appearing, toxic-appearing or diaphoretic.   HENT:      Head: Normocephalic.      Nose: Congestion present.      Right Sinus: Maxillary sinus tenderness and frontal sinus tenderness present.      Left Sinus: Maxillary sinus tenderness and frontal sinus tenderness present.      Comments: Per pt       Mouth/Throat:      Lips: Pink.      Mouth: Mucous membranes are moist.   Pulmonary:      Effort: Pulmonary effort is normal.   Neurological:      Mental Status: He is alert and oriented to person, place, and time.          Procedures     Assessment & Plan   Diagnoses and all orders for this visit:    1. Viral sinusitis (Primary)  -     predniSONE (DELTASONE) 10 MG (21) dose pack; Take  by mouth Daily for 6 days.  Dispense: 21 each; Refill: 0  -     fluticasone (FLONASE) 50 MCG/ACT nasal spray; 2 sprays into the nostril(s) as directed by provider Daily.  Dispense: 16 mL; Refill: 0  -     COVID-19 At Home Antigen Test (BinaxNOW COVID-19 Ag Home Test) kit; 1 each into the nostril(s) as directed by provider As Needed (COVID symptoms).  Dispense: 1 kit; Refill: 0    Continue to treat symptoms.    Sinusitis requiring antibiotics is generally diagnosed in the following situations:  No symptom improvement after 10 days  Onset of high fever and purulent nasal drainage, or facial pain for more than 3-4 days  Worsening symptoms following a viral upper respiratory virus that was initially improving  Because you report having symptoms for only 1-2 days and no fever it is unlikely that you need an antibiotic at this time. Majority of cases are viral or allergy related and do not require antibiotics at all.        If symptoms worsen or do not  improve follow up with your PCP or visit your nearest Urgent Care Center or ER.        PLAN: Discussed dosing, side effects, recommended other symptomatic care.  Patient should follow up with primary care provider, Urgent Care or ER if symptoms worsen, fail to resolve or other symptoms need attention. Patient/family agree to the above.         KRIS George     The use of a video visit has been reviewed with the patient and verbal informed consent has been obtained. Myself and Rahul Walters participated in this visit. The patient is located at [address] [place] [address]. I am located in [place], [place]. Mychart and Zoom were utilized.        This visit was performed via Telehealth.  This patient has been instructed to follow-up with their primary care provider if their symptoms worsen or the treatment provided does not resolve their illness.

## 2023-11-02 NOTE — PATIENT INSTRUCTIONS
Continue to treat symptoms.    Sinusitis requiring antibiotics is generally diagnosed in the following situations:  No symptom improvement after 10 days  Onset of high fever and purulent nasal drainage, or facial pain for more than 3-4 days  Worsening symptoms following a viral upper respiratory virus that was initially improving  Because you report having symptoms for only 1-2 days and no fever it is unlikely that you need an antibiotic at this time. Majority of cases are viral or allergy related and do not require antibiotics at all.        If symptoms worsen or do not improve follow up with your PCP or visit your nearest Urgent Care Center or ER.

## 2023-11-07 RX ORDER — AMOXICILLIN AND CLAVULANATE POTASSIUM 875; 125 MG/1; MG/1
1 TABLET, FILM COATED ORAL 2 TIMES DAILY
Qty: 14 TABLET | Refills: 0 | Status: SHIPPED | OUTPATIENT
Start: 2023-11-07 | End: 2023-11-14

## 2023-11-08 ENCOUNTER — TELEPHONE (OUTPATIENT)
Dept: FAMILY MEDICINE CLINIC | Facility: CLINIC | Age: 33
End: 2023-11-08
Payer: COMMERCIAL

## 2023-11-08 NOTE — TELEPHONE ENCOUNTER
Patients son was diagnosed with a micro deletion and the patient would like to get tested for this same dx, he is requesting a referral to genetic counseling at [facility]

## 2023-11-17 NOTE — TELEPHONE ENCOUNTER
Spoke to patient and advised of  message. Patient voiced understanding and I sent him to front office to schedule an appointment.

## 2023-12-04 ENCOUNTER — TELEMEDICINE (OUTPATIENT)
Dept: FAMILY MEDICINE CLINIC | Facility: CLINIC | Age: 33
End: 2023-12-04
Payer: COMMERCIAL

## 2023-12-04 DIAGNOSIS — Q93.88 OTHER MICRODELETIONS: Primary | ICD-10-CM

## 2023-12-04 PROCEDURE — 99212 OFFICE O/P EST SF 10 MIN: CPT | Performed by: FAMILY MEDICINE

## 2023-12-04 NOTE — PROGRESS NOTES
Rahul Walters is a 33 y.o. male who presents today for Lab result follow-up    You have chosen to receive care through a telemedicine visit. Do you consent to use a telemedicine visit for your medical care today? Yes.  Patient is in his home in [place] and I am in my office in [place].    Patient has genetic testing done do to his son having some micro deletions. He was tested and also has some micro deletions. It was recommended that he see a genetic specialist for further evaluation. He has no acute complaints today.          Review of Systems   Constitutional:  Negative for fever and unexpected weight loss.   HENT:  Negative for congestion, ear pain and sore throat.    Eyes:  Negative for visual disturbance.   Respiratory:  Negative for cough, shortness of breath and wheezing.    Cardiovascular:  Negative for chest pain and palpitations.   Gastrointestinal:  Negative for abdominal pain, blood in stool, constipation, diarrhea, nausea, vomiting and GERD.   Endocrine: Negative for polydipsia and polyuria.   Genitourinary:  Negative for difficulty urinating.   Musculoskeletal:  Negative for joint swelling.   Skin:  Negative for rash and skin lesions.   Allergic/Immunologic: Negative for environmental allergies.   Neurological:  Negative for seizures and syncope.   Hematological:  Does not bruise/bleed easily.   Psychiatric/Behavioral:  Negative for suicidal ideas.         The following portions of the patient's history were reviewed and updated as appropriate: allergies, current medications, past family history, past medical history, past social history, past surgical history and problem list.    Current Outpatient Medications on File Prior to Visit   Medication Sig Dispense Refill    albuterol sulfate  (90 Base) MCG/ACT inhaler Inhale 2 puffs Every 4 (Four) Hours As Needed for Wheezing or Shortness of Air. 18 g 0    COVID-19 At Home Antigen Test (BinaxNOW COVID-19 Ag Home Test) kit 1 each into  the nostril(s) as directed by provider As Needed (COVID symptoms). 1 kit 0    diphenhydrAMINE (BENADRYL) 25 mg capsule Take 1 capsule by mouth Every 6 (Six) Hours As Needed for Allergies. 42 capsule 0    famotidine (PEPCID) 40 MG tablet Take 1 tablet by mouth Daily. 14 tablet 0    fluticasone (FLONASE) 50 MCG/ACT nasal spray 2 sprays into the nostril(s) as directed by provider Daily. 16 mL 0    lisinopril (PRINIVIL,ZESTRIL) 20 MG tablet Take 1 tablet by mouth Daily. 90 tablet 3    pantoprazole (PROTONIX) 20 MG EC tablet TAKE ONE TABLET BY MOUTH DAILY 90 tablet 1    Spacer/Aero-Holding Chambers (AeroChamber Plus Santos-Vu) misc       SUMAtriptan (IMITREX) 100 MG tablet Take one tablet at onset of headache. May repeat dose one time in 2 hours if headache not relieved. 9 tablet 5    Symbicort 80-4.5 MCG/ACT inhaler        No current facility-administered medications on file prior to visit.       No Known Allergies     There were no vitals taken for this visit.     Physical Exam  Constitutional:       General: He is not in acute distress.     Appearance: Normal appearance. He is not ill-appearing, toxic-appearing or diaphoretic.   Pulmonary:      Effort: Pulmonary effort is normal. No respiratory distress.   Neurological:      General: No focal deficit present.      Mental Status: He is alert. Mental status is at baseline.   Psychiatric:         Mood and Affect: Mood normal.         Behavior: Behavior normal.               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  The reviewed interval involves exons 1 through 11 of the CACNA 1A gene.  It also shows a 80 kb deletion within the cytogenic band 19p13.42 which per report is of uncertain clinical significance.  There is related interval involves the NLRP2, NLRP7, and GP 6 genes.  Patient was given referral to  genetic counselor for further evaluation.         Relevant Orders    Ambulatory Referral to Genetic Counseling/Testing     Diagnoses         Codes Comments    Other microdeletions    -  Primary ICD-10-CM: Q93.88  ICD-9-CM: 758.33             Follow-up as needed.    This was an audio and video enabled telemedicine encounter.    13 minutes was spent on this patient encounter which included history taking, physical exam, answering patient questions, counseling, discussing treatment plan, placing orders, and documentation.    Boom Benoit MD   12/4/2023

## 2023-12-04 NOTE — ASSESSMENT & PLAN NOTE
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  The reviewed interval involves exons 1 through 11 of the CACNA 1A gene.  It also shows a 80 kb deletion within the cytogenic band 19p13.42 which per report is of uncertain clinical significance.  There is related interval involves the NLRP2, NLRP7, and GP 6 genes.  Patient was given referral to genetic counselor for further evaluation.

## 2023-12-09 ENCOUNTER — TELEMEDICINE (OUTPATIENT)
Dept: FAMILY MEDICINE CLINIC | Facility: TELEHEALTH | Age: 33
End: 2023-12-09
Payer: COMMERCIAL

## 2023-12-09 DIAGNOSIS — J02.0 ACUTE STREPTOCOCCAL PHARYNGITIS: ICD-10-CM

## 2023-12-09 DIAGNOSIS — J02.0 ACUTE STREPTOCOCCAL PHARYNGITIS: Primary | ICD-10-CM

## 2023-12-09 RX ORDER — LIDOCAINE HYDROCHLORIDE 20 MG/ML
10 SOLUTION OROPHARYNGEAL AS NEEDED
Qty: 200 ML | Refills: 0 | Status: SHIPPED | OUTPATIENT
Start: 2023-12-09 | End: 2023-12-19

## 2023-12-09 RX ORDER — LIDOCAINE HYDROCHLORIDE 20 MG/ML
10 SOLUTION OROPHARYNGEAL AS NEEDED
Qty: 200 ML | Refills: 0 | Status: SHIPPED | OUTPATIENT
Start: 2023-12-09 | End: 2023-12-09 | Stop reason: SDUPTHER

## 2023-12-09 RX ORDER — AZITHROMYCIN 500 MG/1
500 TABLET, FILM COATED ORAL DAILY
Qty: 5 TABLET | Refills: 0 | Status: SHIPPED | OUTPATIENT
Start: 2023-12-09 | End: 2023-12-14

## 2023-12-09 NOTE — PROGRESS NOTES
You have chosen to receive care through a telehealth visit.  Do you consent to use a video/audio connection for your medical care today? Yes     CHIEF COMPLAINT  Chief Complaint   Patient presents with    Sore Throat         HPI  Rahul Walters is a 33 y.o. male  presents with complaint of s    Review of Systems   HENT:  Positive for sore throat.    All other systems reviewed and are negative.  ore throat, swollen tonsils.  Throat is red with white patches.  Symptoms started yesterday    Past Medical History:   Diagnosis Date    Dental caries     GERD (gastroesophageal reflux disease)     Gingivitis     HTN (hypertension)     Hypertension     Obesity     Periodontal disease     Tobacco dependence        Family History   Problem Relation Age of Onset    Heart attack Mother     Coronary artery disease Mother     Stroke Father     Hypertension Father     No Known Problems Sister     No Known Problems Brother     Cancer Maternal Grandmother     Heart disease Maternal Grandfather     Hypertension Paternal Grandmother     Heart attack Paternal Grandmother     Coronary artery disease Paternal Grandmother     Alcohol abuse Paternal Grandfather     No Known Problems Daughter     No Known Problems Son        Social History     Socioeconomic History    Marital status:      Spouse name: Suin    Number of children: 2    Years of education: H.S.    Highest education level: High school graduate   Tobacco Use    Smoking status: Heavy Smoker     Packs/day: 1.50     Years: 12.00     Additional pack years: 0.00     Total pack years: 18.00     Types: Cigarettes     Passive exposure: Never    Smokeless tobacco: Never   Vaping Use    Vaping Use: Former   Substance and Sexual Activity    Alcohol use: No    Drug use: No    Sexual activity: Yes     Partners: Female     Birth control/protection: Tubal ligation       Rahul Walters  reports that he has been smoking cigarettes. He has a 18.00 pack-year smoking  history. He has never been exposed to tobacco smoke. He has never used smokeless tobacco.. I have educated him on the risk of diseases from using tobacco products such as cancer, COPD, and heart disease.     I advised him to quit and he is not willing to quit.    I spent  1  minutes counseling the patient.              There were no vitals taken for this visit.    PHYSICAL EXAM  Physical Exam   Constitutional: He appears well-developed and well-nourished.   HENT:   Head: Normocephalic.   Mouth/Throat: Oropharyngeal exudate present.   Eyes: Pupils are equal, round, and reactive to light.   Pulmonary/Chest: Effort normal.   Musculoskeletal: Normal range of motion.   Neurological: He is alert.   Psychiatric: He has a normal mood and affect.       Results for orders placed or performed in visit on 08/19/22   Gastrointestinal Panel, PCR - Stool, Per Rectum    Specimen: Per Rectum; Stool   Result Value Ref Range    Campylobacter Not Detected Not Detected    Plesiomonas shigelloides Not Detected Not Detected    Salmonella Not Detected Not Detected    Vibrio Not Detected Not Detected    Vibrio cholerae Not Detected Not Detected    Yersinia enterocolitica Not Detected Not Detected    Enteroaggregative E. coli (EAEC) Not Detected Not Detected    Enteropathogenic E. coli (EPEC) Not Detected Not Detected    Enterotoxigenic E. coli (ETEC) lt/st Not Detected Not Detected    Shiga-like toxin-producing E. coli (STEC) stx1/stx2 Not Detected Not Detected    Shigella/Enteroinvasive E. coli (EIEC) Not Detected Not Detected    Cryptosporidium Not Detected Not Detected    Cyclospora cayetanensis Not Detected Not Detected    Entamoeba histolytica Not Detected Not Detected    Giardia lamblia Not Detected Not Detected    Adenovirus F40/41 Not Detected Not Detected    Astrovirus Not Detected Not Detected    Norovirus GI/GII Not Detected Not Detected    Rotavirus A Not Detected Not Detected    Sapovirus (I, II, IV or V) Not Detected Not  Detected   Clostridioides difficile Toxin, PCR - Stool, Per Rectum    Specimen: Per Rectum; Stool   Result Value Ref Range    Toxigenic C. difficile by PCR Not Detected Not Detected       Diagnoses and all orders for this visit:    1. Acute streptococcal pharyngitis (Primary)  -     azithromycin (Zithromax) 500 MG tablet; Take 1 tablet by mouth Daily for 5 days.  Dispense: 5 tablet; Refill: 0  -     Lidocaine Viscous HCl (XYLOCAINE) 2 % solution; Take 10 mL by mouth As Needed for Mild Pain for up to 10 days.  Dispense: 200 mL; Refill: 0          FOLLOW-UP  As discussed during visit with PCP/[facility] if no improvement or Urgent Care/Emergency Department if worsening of symptoms    Patient verbalizes understanding of medication dosage, comfort measures, instructions for treatment and follow-up.    Radha Hazel, KRIS  12/09/2023  07:06 EST    The use of a video visit has been reviewed with the patient and verbal informed consent has been obtained. Myself and Rahul Walters participated in this visit. The patient is located in [address] [place] [address].    I am located in [place], [place]. Mychart and Twilio were utilized. I spent 10 minutes in the patient's chart for this visit.

## 2023-12-12 PROCEDURE — 87070 CULTURE OTHR SPECIMN AEROBIC: CPT | Performed by: NURSE PRACTITIONER

## 2023-12-12 PROCEDURE — 87205 SMEAR GRAM STAIN: CPT | Performed by: NURSE PRACTITIONER

## 2024-01-04 DIAGNOSIS — I10 PRIMARY HYPERTENSION: ICD-10-CM

## 2024-01-04 DIAGNOSIS — K21.9 GASTROESOPHAGEAL REFLUX DISEASE, UNSPECIFIED WHETHER ESOPHAGITIS PRESENT: ICD-10-CM

## 2024-01-04 RX ORDER — LISINOPRIL 20 MG/1
20 TABLET ORAL DAILY
Qty: 90 TABLET | Refills: 0 | Status: SHIPPED | OUTPATIENT
Start: 2024-01-04

## 2024-01-04 RX ORDER — PANTOPRAZOLE SODIUM 20 MG/1
20 TABLET, DELAYED RELEASE ORAL DAILY
Qty: 90 TABLET | Refills: 0 | Status: SHIPPED | OUTPATIENT
Start: 2024-01-04

## 2024-01-04 NOTE — TELEPHONE ENCOUNTER
Rx Refill Note  Requested Prescriptions     Pending Prescriptions Disp Refills    lisinopril (PRINIVIL,ZESTRIL) 20 MG tablet 90 tablet 3     Sig: Take 1 tablet by mouth Daily.    pantoprazole (PROTONIX) 20 MG EC tablet 90 tablet 1     Sig: Take 1 tablet by mouth Daily.      Last office visit with prescribing clinician: 3/29/2023   Last telemedicine visit with prescribing clinician: 12/4/2023   Next office visit with prescribing clinician: Visit date not found                         Would you like a call back once the refill request has been completed: [] Yes [] No    If the office needs to give you a call back, can they leave a voicemail: [] Yes [] No    Mio Ramon MA  01/04/24, 07:36 EST   Double O-Z Flap Text: The defect edges were debeveled with a #15 scalpel blade.  Given the location of the defect, shape of the defect and the proximity to free margins a Double O-Z flap was deemed most appropriate.  Using a sterile surgical marker, an appropriate transposition flap was drawn incorporating the defect and placing the expected incisions within the relaxed skin tension lines where possible. The area thus outlined was incised deep to adipose tissue with a #15 scalpel blade.  The skin margins were undermined to an appropriate distance in all directions utilizing iris scissors.

## 2024-02-06 ENCOUNTER — TELEMEDICINE (OUTPATIENT)
Dept: FAMILY MEDICINE CLINIC | Facility: CLINIC | Age: 34
End: 2024-02-06
Payer: COMMERCIAL

## 2024-02-06 DIAGNOSIS — N52.03 COMBINED ARTERIAL INSUFFICIENCY AND CORPORO-VENOUS OCCLUSIVE ERECTILE DYSFUNCTION: Primary | ICD-10-CM

## 2024-02-06 PROBLEM — R36.1 BLOOD IN SEMEN: Status: RESOLVED | Noted: 2023-02-23 | Resolved: 2024-02-06

## 2024-02-06 PROBLEM — N50.89 SCROTUM SWELLING: Status: RESOLVED | Noted: 2023-02-23 | Resolved: 2024-02-06

## 2024-02-06 PROCEDURE — 1160F RVW MEDS BY RX/DR IN RCRD: CPT | Performed by: FAMILY MEDICINE

## 2024-02-06 PROCEDURE — 99213 OFFICE O/P EST LOW 20 MIN: CPT | Performed by: FAMILY MEDICINE

## 2024-02-06 PROCEDURE — 1159F MED LIST DOCD IN RCRD: CPT | Performed by: FAMILY MEDICINE

## 2024-02-06 RX ORDER — SILDENAFIL 25 MG/1
TABLET, FILM COATED ORAL
Qty: 30 TABLET | Refills: 5 | Status: SHIPPED | OUTPATIENT
Start: 2024-02-06

## 2024-02-06 NOTE — PROGRESS NOTES
Rahul Walters is a 33 y.o. male who presents today for Erectile Dysfunction    You have chosen to receive care through a telemedicine visit. Do you consent to use a telemedicine visit for your medical care today? Yes.  Patient is in his home in [place] and I am in my office in [place].    Erectile Dysfunction  This is a chronic problem. The current episode started more than 1 year ago (Patient has had symptoms on and off for years and have started to effect his marraige.). The problem has been waxing and waning since onset. The nature of his difficulty is maintaining erection and penetration. He reports no anxiety, decreased libido or performance anxiety. He reports his erection duration to be 1 to 5 minutes (sometimes can last 30 minutes). Irritative symptoms do not include frequency, nocturia or urgency. Obstructive symptoms do not include dribbling, incomplete emptying, an intermittent stream, a slower stream, straining or a weak stream. Pertinent negatives include no chills, dysuria, genital pain, hematuria, hesitancy or inability to urinate. Nothing aggravates the symptoms. Past treatments include nothing. Risk factors include hypertension and tobacco use.        Review of Systems   Constitutional:  Negative for chills, fever and unexpected weight loss.   HENT:  Negative for congestion, ear pain and sore throat.    Eyes:  Negative for visual disturbance.   Respiratory:  Negative for cough, shortness of breath and wheezing.    Cardiovascular:  Negative for chest pain and palpitations.   Gastrointestinal:  Negative for abdominal pain, blood in stool, constipation, diarrhea, nausea, vomiting and GERD.   Endocrine: Negative for polydipsia and polyuria.   Genitourinary:  Positive for erectile dysfunction. Negative for decreased libido, difficulty urinating, dysuria, frequency, hematuria, hesitancy, incomplete emptying, nocturia and urgency.   Musculoskeletal:  Negative for joint swelling.   Skin:   Negative for rash and skin lesions.   Allergic/Immunologic: Negative for environmental allergies.   Neurological:  Negative for seizures and syncope.   Hematological:  Does not bruise/bleed easily.   Psychiatric/Behavioral:  Negative for suicidal ideas. The patient is not nervous/anxious.         The following portions of the patient's history were reviewed and updated as appropriate: allergies, current medications, past family history, past medical history, past social history, past surgical history and problem list.    Current Outpatient Medications on File Prior to Visit   Medication Sig Dispense Refill    albuterol sulfate  (90 Base) MCG/ACT inhaler Inhale 2 puffs Every 4 (Four) Hours As Needed for Wheezing or Shortness of Air. 18 g 0    diphenhydrAMINE (BENADRYL) 25 mg capsule Take 1 capsule by mouth Every 6 (Six) Hours As Needed for Allergies. 42 capsule 0    famotidine (PEPCID) 40 MG tablet Take 1 tablet by mouth Daily. 14 tablet 0    fluticasone (FLONASE) 50 MCG/ACT nasal spray 2 sprays into the nostril(s) as directed by provider Daily. 16 mL 0    lisinopril (PRINIVIL,ZESTRIL) 20 MG tablet Take 1 tablet by mouth Daily. 90 tablet 0    pantoprazole (PROTONIX) 20 MG EC tablet Take 1 tablet by mouth Daily. 90 tablet 0    Spacer/Aero-Holding Chambers (AeroChamber Plus Santos-Vu) misc       SUMAtriptan (IMITREX) 100 MG tablet Take one tablet at onset of headache. May repeat dose one time in 2 hours if headache not relieved. 9 tablet 5    Symbicort 80-4.5 MCG/ACT inhaler        No current facility-administered medications on file prior to visit.       Allergies   Allergen Reactions    Latex Rash        There were no vitals taken for this visit.     Physical Exam  Constitutional:       General: He is not in acute distress.     Appearance: Normal appearance. He is not ill-appearing, toxic-appearing or diaphoretic.   Pulmonary:      Effort: Pulmonary effort is normal. No respiratory distress.   Neurological:       General: No focal deficit present.      Mental Status: He is alert. Mental status is at baseline.   Psychiatric:         Mood and Affect: Mood normal.         Behavior: Behavior normal.           Problems Addressed this Visit          Genitourinary and Reproductive     Combined arterial insufficiency and corporo-venous occlusive erectile dysfunction - Primary     Patient struggling with erectile dysfunction.  He is able to get an erection but cannot maintain an erection for penetration.  Patient does not have any difficulty with ejaculation.  Patient was prescribed Viagra to try as needed.  RTC/ED precautions given.         Relevant Medications    sildenafil (VIAGRA) 25 MG tablet     Diagnoses         Codes Comments    Combined arterial insufficiency and corporo-venous occlusive erectile dysfunction    -  Primary ICD-10-CM: N52.03  ICD-9-CM: 607.84             Return in about 3 months (around 5/6/2024) for Annual.    This was an audio and video enabled telemedicine encounter.    Boom Benoit MD   2/6/2024

## 2024-02-06 NOTE — ASSESSMENT & PLAN NOTE
Patient struggling with erectile dysfunction.  He is able to get an erection but cannot maintain an erection for penetration.  Patient does not have any difficulty with ejaculation.  Patient was prescribed Viagra to try as needed.  RTC/ED precautions given.

## 2024-02-20 ENCOUNTER — TELEMEDICINE (OUTPATIENT)
Dept: FAMILY MEDICINE CLINIC | Facility: TELEHEALTH | Age: 34
End: 2024-02-20
Payer: COMMERCIAL

## 2024-02-20 DIAGNOSIS — J06.9 ACUTE URI: Primary | ICD-10-CM

## 2024-02-20 PROCEDURE — 1160F RVW MEDS BY RX/DR IN RCRD: CPT | Performed by: NURSE PRACTITIONER

## 2024-02-20 PROCEDURE — 1159F MED LIST DOCD IN RCRD: CPT | Performed by: NURSE PRACTITIONER

## 2024-02-20 PROCEDURE — 99213 OFFICE O/P EST LOW 20 MIN: CPT | Performed by: NURSE PRACTITIONER

## 2024-02-20 RX ORDER — GUAIFENESIN 600 MG/1
1200 TABLET, EXTENDED RELEASE ORAL 2 TIMES DAILY PRN
Qty: 20 TABLET | Refills: 0 | Status: SHIPPED | OUTPATIENT
Start: 2024-02-20

## 2024-02-20 RX ORDER — PREDNISONE 20 MG/1
20 TABLET ORAL 2 TIMES DAILY
Qty: 10 TABLET | Refills: 0 | Status: SHIPPED | OUTPATIENT
Start: 2024-02-20 | End: 2024-02-25

## 2024-02-20 NOTE — PROGRESS NOTES
HPI  Rahul Walters is a 33 y.o. male  presents with complaint of sinus pressure, headache, dizzy, stuffy nose for 3-4 days. Denies fever, chills, SOA, N/V/D. No known covid or flu exposure.     Review of Systems    Past Medical History:   Diagnosis Date    Dental caries     GERD (gastroesophageal reflux disease)     Gingivitis     HTN (hypertension)     Hypertension     Obesity     Periodontal disease     Tobacco dependence        Family History   Problem Relation Age of Onset    Heart attack Mother     Coronary artery disease Mother     Stroke Father     Hypertension Father     No Known Problems Sister     No Known Problems Brother     Cancer Maternal Grandmother     Heart disease Maternal Grandfather     Hypertension Paternal Grandmother     Heart attack Paternal Grandmother     Coronary artery disease Paternal Grandmother     Alcohol abuse Paternal Grandfather     No Known Problems Daughter     No Known Problems Son        Social History     Socioeconomic History    Marital status:      Spouse name: Suni    Number of children: 2    Years of education: H.S.    Highest education level: High school graduate   Tobacco Use    Smoking status: Heavy Smoker     Packs/day: 1.50     Years: 12.00     Additional pack years: 0.00     Total pack years: 18.00     Types: Cigarettes     Passive exposure: Never    Smokeless tobacco: Never   Vaping Use    Vaping Use: Former   Substance and Sexual Activity    Alcohol use: No    Drug use: No    Sexual activity: Yes     Partners: Female     Birth control/protection: Tubal ligation         There were no vitals taken for this visit.    PHYSICAL EXAM  Physical Exam   Constitutional: He appears well-developed and well-nourished.   HENT:   Head: Normocephalic.   Nose: Nose normal.   Neck: Neck normal appearance.  Pulmonary/Chest: Effort normal.   Neurological: He is alert.   Psychiatric: He has a normal mood and affect. His speech is normal.       Diagnoses and all  orders for this visit:    1. Acute URI (Primary)  -     predniSONE (DELTASONE) 20 MG tablet; Take 1 tablet by mouth 2 (Two) Times a Day for 5 days.  Dispense: 10 tablet; Refill: 0  -     guaiFENesin (Mucinex) 600 MG 12 hr tablet; Take 2 tablets by mouth 2 (Two) Times a Day As Needed for Cough.  Dispense: 20 tablet; Refill: 0        Take flonase and benadryl at home as discussed.     FOLLOW-UP  As discussed during visit with [facility], if symptoms worsen or fail to improve, follow-up with PCP/Urgent Care/Emergency Department.    Patient verbalizes understanding of medications, instructions for treatment and follow-up.    Shayla Paul, APRN  02/20/2024  15:32 EST    The use of a video visit has been reviewed with the patient and verbal informed consent has been obtained. Myself and Rahul Walters participated in this visit. The patient is located in [place], [place], and I am located in [place], [place]. [employer] and [employer] were utilized.

## 2024-02-22 DIAGNOSIS — K21.9 GASTROESOPHAGEAL REFLUX DISEASE, UNSPECIFIED WHETHER ESOPHAGITIS PRESENT: ICD-10-CM

## 2024-02-23 ENCOUNTER — TELEMEDICINE (OUTPATIENT)
Dept: FAMILY MEDICINE CLINIC | Facility: CLINIC | Age: 34
End: 2024-02-23
Payer: COMMERCIAL

## 2024-02-23 DIAGNOSIS — J01.00 ACUTE NON-RECURRENT MAXILLARY SINUSITIS: Primary | ICD-10-CM

## 2024-02-23 DIAGNOSIS — K59.00 CONSTIPATION, UNSPECIFIED CONSTIPATION TYPE: ICD-10-CM

## 2024-02-23 PROCEDURE — 1160F RVW MEDS BY RX/DR IN RCRD: CPT | Performed by: PHYSICIAN ASSISTANT

## 2024-02-23 PROCEDURE — 1159F MED LIST DOCD IN RCRD: CPT | Performed by: PHYSICIAN ASSISTANT

## 2024-02-23 PROCEDURE — 99213 OFFICE O/P EST LOW 20 MIN: CPT | Performed by: PHYSICIAN ASSISTANT

## 2024-02-23 RX ORDER — PANTOPRAZOLE SODIUM 20 MG/1
20 TABLET, DELAYED RELEASE ORAL DAILY
Qty: 90 TABLET | Refills: 0 | Status: SHIPPED | OUTPATIENT
Start: 2024-02-23

## 2024-02-23 RX ORDER — AMOXICILLIN AND CLAVULANATE POTASSIUM 875; 125 MG/1; MG/1
1 TABLET, FILM COATED ORAL 2 TIMES DAILY
Qty: 20 TABLET | Refills: 0 | Status: SHIPPED | OUTPATIENT
Start: 2024-02-23

## 2024-02-23 NOTE — TELEPHONE ENCOUNTER
Rx Refill Note  Requested Prescriptions     Pending Prescriptions Disp Refills    pantoprazole (PROTONIX) 20 MG EC tablet 90 tablet 0     Sig: Take 1 tablet by mouth Daily.      Last office visit with prescribing clinician: Visit date not found   Last telemedicine visit with prescribing clinician: 2/6/2024   Next office visit with prescribing clinician: Visit date not found                         Would you like a call back once the refill request has been completed: [] Yes [] No    If the office needs to give you a call back, can they leave a voicemail: [] Yes [] No    Aspen Amanda MA  02/23/24, 07:32 EST

## 2024-02-23 NOTE — PROGRESS NOTES
Telehealth E-Visit      Patient Name: Rahul Walters  : 1990   MRN: [id number]     Chief Complaint:  No chief complaint on file.      I have reviewed the E-Visit questionnaire and the patient's answers, my assessment and plan are listed below.     History of Present Illness: Rahul Walters is a 33 y.o. male who is here today for a telehealth visit. He is at home in [place] and the provider is in office. He has been having sinus issues.    He had an urgent care video visit at the beginning of the week. At that time, he had sinus issues, but the sinus pressure went away. He was diagnosed with an upper respiratory infection. He was informed to make an appointment towards the end of the week if he did not feel better. He was given steroids, which did not help. He has a lot of mucus, and he has a mild cough today. He has taken Augmentin in the past and tolerated it well.    He has been constipated for about a week. He has taken a stool softener, which helped some, but it did not help that much. He has taken MiraLAX once a day for the past 2 to 3 days, which did not help.    He denies any allergies to antibiotics.    Subjective      Review of Systems:   Review of Systems   Constitutional:  Negative for fatigue and fever.   HENT:  Positive for sinus pressure. Negative for trouble swallowing.    Eyes:  Negative for visual disturbance.   Respiratory:  Positive for cough. Negative for shortness of breath.    Cardiovascular:  Negative for chest pain and leg swelling.   Gastrointestinal:  Negative for abdominal pain.       I have reviewed and the following portions of the patient's history were updated as appropriate: past family history, past medical history, past social history, past surgical history and problem list.    Medications:     Current Outpatient Medications:     albuterol sulfate  (90 Base) MCG/ACT inhaler, Inhale 2 puffs Every 4 (Four) Hours As Needed for Wheezing or  Shortness of Air., Disp: 18 g, Rfl: 0    amoxicillin-clavulanate (AUGMENTIN) 875-125 MG per tablet, Take 1 tablet by mouth 2 (Two) Times a Day., Disp: 20 tablet, Rfl: 0    diphenhydrAMINE (BENADRYL) 25 mg capsule, Take 1 capsule by mouth Every 6 (Six) Hours As Needed for Allergies., Disp: 42 capsule, Rfl: 0    famotidine (PEPCID) 40 MG tablet, Take 1 tablet by mouth Daily., Disp: 14 tablet, Rfl: 0    fluticasone (FLONASE) 50 MCG/ACT nasal spray, 2 sprays into the nostril(s) as directed by provider Daily., Disp: 16 mL, Rfl: 0    guaiFENesin (Mucinex) 600 MG 12 hr tablet, Take 2 tablets by mouth 2 (Two) Times a Day As Needed for Cough., Disp: 20 tablet, Rfl: 0    lisinopril (PRINIVIL,ZESTRIL) 20 MG tablet, Take 1 tablet by mouth Daily., Disp: 90 tablet, Rfl: 0    pantoprazole (PROTONIX) 20 MG EC tablet, Take 1 tablet by mouth Daily., Disp: 90 tablet, Rfl: 0    predniSONE (DELTASONE) 20 MG tablet, Take 1 tablet by mouth 2 (Two) Times a Day for 5 days., Disp: 10 tablet, Rfl: 0    sildenafil (VIAGRA) 25 MG tablet, Take 0.5 to 4 tablets as needed for erectile dysfunction 1 to 2 hours before sexual activity.  Do not exceed more than 4 tablets in a day., Disp: 30 tablet, Rfl: 5    Spacer/Aero-Holding Chambers (AeroChamber Plus Santos-Vu) misc, , Disp: , Rfl:     SUMAtriptan (IMITREX) 100 MG tablet, Take one tablet at onset of headache. May repeat dose one time in 2 hours if headache not relieved., Disp: 9 tablet, Rfl: 5    Symbicort 80-4.5 MCG/ACT inhaler, , Disp: , Rfl:     Allergies:   Allergies   Allergen Reactions    Latex Rash       Objective     Physical Exam:  Vital Signs: There were no vitals filed for this visit.  There is no height or weight on file to calculate BMI.    Physical Exam  This is an audiovisual visit.  Speech normal. No dyspnea. Upper respiratory congestion and nasal congestion heard. Normal affect. No facial swelling.     Assessment / Plan      Assessment/Plan:   Diagnoses and all orders for this  visit:    1. Acute non-recurrent maxillary sinusitis (Primary)  -     amoxicillin-clavulanate (AUGMENTIN) 875-125 MG per tablet; Take 1 tablet by mouth 2 (Two) Times a Day.  Dispense: 20 tablet; Refill: 0    2. Constipation, unspecified constipation type       1. Sinusitis.  Augmentin as directed for sinusitis.    2. Constipation.  He may try Senokot and continue the MiraLAX. If this does not improve, he will let me know.    Follow Up:   No follow-ups on file.    Any medications prescribed have been sent electronically to   [facility] [id number] - [place], [place] - [address] - [phone]  - [phone] FX  [address] [facility] [address]  Phone: [phone] Fax: [phone]    [address] - [address] - [phone]  - [phone]   [address]  [facility] [address]  Phone: [phone] Fax: [phone]      15 minutes were spent reviewing the patient's questionnaire, formulating a treatment plan, and relaying information to the patient via [employer].    Chary Garrido PA-C  [facility] [place]   02/23/24  08:21 EST    Transcribed from ambient dictation for Chary Garrido PA-C by Abimbola Lopez.  02/23/24   09:11 EST    Patient or patient representative verbalized consent to the visit recording.  I have personally performed the services described in this document as transcribed by the above individual, and it is both accurate and complete.

## 2024-03-25 ENCOUNTER — TELEMEDICINE (OUTPATIENT)
Dept: FAMILY MEDICINE CLINIC | Facility: TELEHEALTH | Age: 34
End: 2024-03-25
Payer: COMMERCIAL

## 2024-03-25 DIAGNOSIS — J01.00 ACUTE NON-RECURRENT MAXILLARY SINUSITIS: Primary | ICD-10-CM

## 2024-03-25 PROBLEM — R22.1 NECK MASS: Status: ACTIVE | Noted: 2024-03-25

## 2024-03-25 PROBLEM — Z72.0 TOBACCO ABUSE: Status: ACTIVE | Noted: 2024-03-25

## 2024-03-25 PROBLEM — K21.9 LARYNGOPHARYNGEAL REFLUX (LPR): Status: ACTIVE | Noted: 2024-03-25

## 2024-03-25 PROBLEM — G47.33 OSA (OBSTRUCTIVE SLEEP APNEA): Status: ACTIVE | Noted: 2024-03-25

## 2024-03-25 PROBLEM — R49.0 HOARSENESS: Status: ACTIVE | Noted: 2024-03-25

## 2024-03-25 PROCEDURE — 1160F RVW MEDS BY RX/DR IN RCRD: CPT | Performed by: NURSE PRACTITIONER

## 2024-03-25 PROCEDURE — 99213 OFFICE O/P EST LOW 20 MIN: CPT | Performed by: NURSE PRACTITIONER

## 2024-03-25 PROCEDURE — 1159F MED LIST DOCD IN RCRD: CPT | Performed by: NURSE PRACTITIONER

## 2024-03-25 RX ORDER — DOXYCYCLINE HYCLATE 100 MG/1
100 CAPSULE ORAL 2 TIMES DAILY
Qty: 20 CAPSULE | Refills: 0 | Status: SHIPPED | OUTPATIENT
Start: 2024-03-25 | End: 2024-04-04

## 2024-03-25 RX ORDER — METHYLPREDNISOLONE 4 MG/1
TABLET ORAL
Qty: 21 TABLET | Refills: 0 | Status: SHIPPED | OUTPATIENT
Start: 2024-03-25

## 2024-03-26 NOTE — PROGRESS NOTES
You have chosen to receive care through a telehealth visit.  Do you consent to use a video/audio connection for your medical care today? Yes     CHIEF COMPLAINT  Chief Complaint   Patient presents with    Cough         HPI  Rahul Walters is a 33 y.o. male  presents with complaint of cough. Reports symptoms started 2 weeks ago. Reports he has coughing up green mucous. Reports he is also blowing green secretions from the nose. Reports no fever or chills. No nausea or vomiting. No CP or SOA. Reports he has not taken any medication for his symptoms.     Review of Systems   Constitutional:  Negative for chills, fatigue and fever.   HENT:  Positive for congestion, sinus pressure and sinus pain. Negative for ear discharge, ear pain and sore throat.    Respiratory:  Positive for cough. Negative for chest tightness, shortness of breath and wheezing.    Cardiovascular:  Negative for chest pain.   Gastrointestinal:  Negative for abdominal pain, diarrhea, nausea and vomiting.   Musculoskeletal:  Negative for back pain and myalgias.   Neurological:  Negative for dizziness and headaches.   Psychiatric/Behavioral: Negative.         Past Medical History:   Diagnosis Date    Dental caries     GERD (gastroesophageal reflux disease)     Gingivitis     HTN (hypertension)     Hypertension     Obesity     Periodontal disease     Tobacco dependence        Family History   Problem Relation Age of Onset    Heart attack Mother     Coronary artery disease Mother     Stroke Father     Hypertension Father     No Known Problems Sister     No Known Problems Brother     Cancer Maternal Grandmother     Heart disease Maternal Grandfather     Hypertension Paternal Grandmother     Heart attack Paternal Grandmother     Coronary artery disease Paternal Grandmother     Alcohol abuse Paternal Grandfather     No Known Problems Daughter     No Known Problems Son        Social History     Socioeconomic History    Marital status:      Spouse  name: Suni    Number of children: 2    Years of education: H.S.    Highest education level: High school graduate   Tobacco Use    Smoking status: Heavy Smoker     Current packs/day: 1.50     Average packs/day: 1.5 packs/day for 12.0 years (18.0 ttl pk-yrs)     Types: Cigarettes     Passive exposure: Never    Smokeless tobacco: Never   Vaping Use    Vaping status: Former   Substance and Sexual Activity    Alcohol use: No    Drug use: No    Sexual activity: Yes     Partners: Female     Birth control/protection: Tubal ligation       Rahul Walters  reports that he has been smoking cigarettes. He has a 18 pack-year smoking history. He has never been exposed to tobacco smoke. He has never used smokeless tobacco. I have educated him on the risk of diseases from using tobacco products such as cancer, COPD, and heart disease.     I advised him to quit and he is willing to quit. We have discussed the following method/s for tobacco cessation:  Counseling.  Together we have set a quit date for  3 months .  He will follow up with PCP in 3 months or sooner to check on his progress.reports he has quit on chantix and wants to speak with his PCP about restarting it    I spent  1  minutes counseling the patient.              There were no vitals taken for this visit.    PHYSICAL EXAM  Physical Exam   Constitutional: He is oriented to person, place, and time. He appears well-developed and well-nourished. No distress.   HENT:   Head: Normocephalic and atraumatic.   Right Ear: Hearing normal.   Left Ear: Hearing normal.   Nose: Congestion present. Right sinus exhibits maxillary sinus tenderness and frontal sinus tenderness. Left sinus exhibits maxillary sinus tenderness and frontal sinus tenderness.   Mouth/Throat: Mouth/Lips are normal.  Patient directed exam    Eyes: Conjunctivae and lids are normal.   Pulmonary/Chest: Effort normal.  No respiratory distress.  Neurological: He is alert and oriented to person, place, and  time.   Psychiatric: He has a normal mood and affect. His speech is normal and behavior is normal.       Results for orders placed or performed during the hospital encounter of 12/12/23   Wound Culture - Wound, Oropharynx    Specimen: Oropharynx; Wound   Result Value Ref Range    Wound Culture Heavy growth (4+) Normal Throat Amaris     Gram Stain Few (2+) Epithelial cells seen     Gram Stain No WBCs seen     Gram Stain       Few (2+) Mixed bacterial morphotypes seen on Gram Stain       Diagnoses and all orders for this visit:    1. Acute non-recurrent maxillary sinusitis (Primary)    Other orders  -     doxycycline (VIBRAMYCIN) 100 MG capsule; Take 1 capsule by mouth 2 (Two) Times a Day for 10 days.  Dispense: 20 capsule; Refill: 0  -     methylPREDNISolone (MEDROL) 4 MG dose pack; Take as directed on package instructions.  Dispense: 21 tablet; Refill: 0    Rest  Fluids  PCP if symptoms persist   ER for worsening symptoms such as high fever, chest pain or SOA       FOLLOW-UP  As discussed during visit with PCP/[facility] if no improvement or Urgent Care/Emergency Department if worsening of symptoms    Patient verbalizes understanding of medication dosage, comfort measures, instructions for treatment and follow-up.    Kathy Kolb, APRN  03/25/2024  20:47 EDT    The use of a video visit has been reviewed with the patient and verbal informed consent has been obtained. Myself and Rahul Walters participated in this visit. The patient is located in [address] [place] [address].    I am located in [place], [place]. [employer] and [employer] were utilized. I spent 5 minutes in the patient's chart for this visit.

## 2024-04-02 ENCOUNTER — TELEMEDICINE (OUTPATIENT)
Dept: FAMILY MEDICINE CLINIC | Facility: CLINIC | Age: 34
End: 2024-04-02
Payer: COMMERCIAL

## 2024-04-02 DIAGNOSIS — J32.9 SINUSITIS, UNSPECIFIED CHRONICITY, UNSPECIFIED LOCATION: ICD-10-CM

## 2024-04-02 DIAGNOSIS — Z71.6 ENCOUNTER FOR SMOKING CESSATION COUNSELING: Primary | ICD-10-CM

## 2024-04-02 DIAGNOSIS — J30.89 ENVIRONMENTAL AND SEASONAL ALLERGIES: ICD-10-CM

## 2024-04-02 RX ORDER — VARENICLINE TARTRATE 1 MG/1
1 TABLET, FILM COATED ORAL 2 TIMES DAILY
Qty: 56 TABLET | Refills: 1 | Status: SHIPPED | OUTPATIENT
Start: 2024-04-30 | End: 2024-06-25

## 2024-04-02 RX ORDER — VARENICLINE TARTRATE 0.5 (11)-1
KIT ORAL
Qty: 1 EACH | Refills: 0 | Status: SHIPPED | OUTPATIENT
Start: 2024-04-02 | End: 2024-04-30

## 2024-04-02 NOTE — PROGRESS NOTES
Rahul Walters is a 33 y.o. male who presents today for Sinusitis    You have chosen to receive care through a telemedicine visit. Do you consent to use a telemedicine visit for your medical care today? Yes.  Patient is in his home in [place] and I am in my office in [place].      Patient was seen through telemedicine on 3/25/2024 by another provider for complaint of cough that started 2 weeks prior.  He was coughing up green mucus and having green secretions from the nose.  She denied fever chills nausea vomiting chest pain and shortness of breath at that time.  He had also not been taking any medication for the symptoms.  Patient was given a Medrol Dosepak and a 10-day course of doxycycline. Patient has completed the steroid and has a few days remaining on the antibiotic. He has seen some improvement but his not completely back to normal. He is still having congestion and cough. Patient has been smoking around 2.5 packs a day and would like to try chantix again to try and quit smoking. He tried it in the past and was able to quit for about a year. He tolerated it well in the past. He can not use the patches due to allergy. He has not tried oral nicotine replacement.        Review of Systems   Constitutional:  Negative for fatigue, fever and unexpected weight loss.   HENT:  Positive for congestion, postnasal drip, rhinorrhea and sinus pressure. Negative for ear pain and sore throat.    Eyes:  Negative for visual disturbance.   Respiratory:  Positive for cough. Negative for shortness of breath and wheezing.    Cardiovascular:  Negative for chest pain and palpitations.   Gastrointestinal:  Negative for abdominal pain, blood in stool, constipation, diarrhea, nausea, vomiting and GERD.   Endocrine: Negative for polydipsia and polyuria.   Genitourinary:  Negative for difficulty urinating.   Musculoskeletal:  Negative for joint swelling.   Skin:  Negative for rash and skin lesions.   Allergic/Immunologic:  Negative for environmental allergies.   Neurological:  Negative for dizziness, seizures, syncope, light-headedness and headache.   Hematological:  Does not bruise/bleed easily.   Psychiatric/Behavioral:  Negative for suicidal ideas.         The following portions of the patient's history were reviewed and updated as appropriate: allergies, current medications, past family history, past medical history, past social history, past surgical history and problem list.    Current Outpatient Medications on File Prior to Visit   Medication Sig Dispense Refill    albuterol sulfate  (90 Base) MCG/ACT inhaler Inhale 2 puffs Every 4 (Four) Hours As Needed for Wheezing or Shortness of Air. 18 g 0    doxycycline (VIBRAMYCIN) 100 MG capsule Take 1 capsule by mouth 2 (Two) Times a Day for 10 days. 20 capsule 0    famotidine (PEPCID) 40 MG tablet Take 1 tablet by mouth Daily. 14 tablet 0    fluticasone (FLONASE) 50 MCG/ACT nasal spray 2 sprays into the nostril(s) as directed by provider Daily. 16 mL 0    guaiFENesin (Mucinex) 600 MG 12 hr tablet Take 2 tablets by mouth 2 (Two) Times a Day As Needed for Cough. 20 tablet 0    lisinopril (PRINIVIL,ZESTRIL) 20 MG tablet Take 1 tablet by mouth Daily. 90 tablet 0    pantoprazole (PROTONIX) 20 MG EC tablet Take 1 tablet by mouth Daily. 90 tablet 0    sildenafil (VIAGRA) 25 MG tablet Take 0.5 to 4 tablets as needed for erectile dysfunction 1 to 2 hours before sexual activity.  Do not exceed more than 4 tablets in a day. 30 tablet 5    Spacer/Aero-Holding Chambers (AeroChamber Plus Santos-Vu) misc       SUMAtriptan (IMITREX) 100 MG tablet Take one tablet at onset of headache. May repeat dose one time in 2 hours if headache not relieved. 9 tablet 5    Symbicort 80-4.5 MCG/ACT inhaler       [DISCONTINUED] amoxicillin-clavulanate (AUGMENTIN) 875-125 MG per tablet Take 1 tablet by mouth 2 (Two) Times a Day. (Patient not taking: Reported on 3/25/2024) 20 tablet 0    [DISCONTINUED]  diphenhydrAMINE (BENADRYL) 25 mg capsule Take 1 capsule by mouth Every 6 (Six) Hours As Needed for Allergies. (Patient not taking: Reported on 3/25/2024) 42 capsule 0    [DISCONTINUED] methylPREDNISolone (MEDROL) 4 MG dose pack Take as directed on package instructions. 21 tablet 0     No current facility-administered medications on file prior to visit.       Allergies   Allergen Reactions    Latex Rash        There were no vitals taken for this visit.     Physical Exam  Constitutional:       General: He is not in acute distress.     Appearance: Normal appearance. He is not ill-appearing, toxic-appearing or diaphoretic.   Pulmonary:      Effort: Pulmonary effort is normal. No respiratory distress.   Neurological:      General: No focal deficit present.      Mental Status: He is alert. Mental status is at baseline.   Psychiatric:         Mood and Affect: Mood normal.         Behavior: Behavior normal.            Problems Addressed this Visit          Allergies and Adverse Reactions    Environmental and seasonal allergies       ENT    Sinusitis     Symptoms overall are improving but cough is lingering.  I suspect this may be secondary to bronchitis or postnasal drip.  Patient will continue and finish the course of doxycycline.  Patient will start Mucinex for cough and congestion as well as Flonase and Claritin or Allegra for allergies.  RTC/ED precautions given.            Tobacco    Encounter for smoking cessation counseling - Primary     Rahul Walters  reports that he has been smoking cigarettes. He has a 18 pack-year smoking history. He has never been exposed to tobacco smoke. He has never used smokeless tobacco. I have educated him on the risk of diseases from using tobacco products such as cancer, COPD, heart disease, cataracts, and arterial disease.     I advised him to quit and he is willing to quit. We have discussed the following method/s for tobacco cessation:  Education Material Counseling Cold  [place] OTC Cessation Products Prescription Medicaiton.  Together we have set a quit date for 1 week from today.  Patient cannot tolerate nicotine patches due to allergy to the adhesive or latex in the patches.  He will try oral nicotine replacement such as gum, lozenges, or nicotine pouches.  He is given prescription for Chantix.  He will follow up with me in 3 months or sooner to check on his progress.    I spent 5.5 minutes counseling the patient.               Relevant Medications    Varenicline Tartrate, Starter, 0.5 MG X 11 & 1 MG X 42 tablet therapy pack    varenicline (Chantix Continuing Month Rashid) 1 MG tablet (Start on 4/30/2024)     Diagnoses         Codes Comments    Encounter for smoking cessation counseling    -  Primary ICD-10-CM: Z71.6  ICD-9-CM: V65.42, 305.1     Environmental and seasonal allergies     ICD-10-CM: J30.89  ICD-9-CM: 477.8     Sinusitis, unspecified chronicity, unspecified location     ICD-10-CM: J32.9  ICD-9-CM: 473.9             Return in about 3 months (around 7/2/2024) for follow-up on hypertension, headaches, and smoking cessation, or if symptoms worsen or fail to improve.    This was an audio and video enabled telemedicine encounter.    Boom Benoit MD   4/2/2024

## 2024-04-02 NOTE — ASSESSMENT & PLAN NOTE
Rahul Walters  reports that he has been smoking cigarettes. He has a 18 pack-year smoking history. He has never been exposed to tobacco smoke. He has never used smokeless tobacco. I have educated him on the risk of diseases from using tobacco products such as cancer, COPD, heart disease, cataracts, and arterial disease.     I advised him to quit and he is willing to quit. We have discussed the following method/s for tobacco cessation:  Education Material Counseling [place] OTC Cessation Products Prescription Medicaiton.  Together we have set a quit date for 1 week from today.  Patient cannot tolerate nicotine patches due to allergy to the adhesive or latex in the patches.  He will try oral nicotine replacement such as gum, lozenges, or nicotine pouches.  He is given prescription for Chantix.  He will follow up with me in 3 months or sooner to check on his progress.    I spent 5.5 minutes counseling the patient.

## 2024-04-02 NOTE — ASSESSMENT & PLAN NOTE
Symptoms overall are improving but cough is lingering.  I suspect this may be secondary to bronchitis or postnasal drip.  Patient will continue and finish the course of doxycycline.  Patient will start Mucinex for cough and congestion as well as Flonase and Claritin or Allegra for allergies.  RTC/ED precautions given.

## 2024-04-02 NOTE — PATIENT INSTRUCTIONS
"Smoking Tobacco Information, Adult  Smoking tobacco can be harmful to your health. Tobacco contains a toxic colorless chemical called nicotine. Nicotine causes changes in your brain that make you want more and more. This is called addiction. This can make it hard to stop smoking once you start. Tobacco also has other toxic chemicals that can hurt your body and raise your risk of many cancers.  Menthol or \"lite\" tobacco or cigarette brands are not safer than regular brands.  How can smoking tobacco affect me?  Smoking tobacco puts you at risk for:  Cancer. Smoking is most commonly associated with lung cancer, but can also lead to cancer in other parts of the body.  Chronic obstructive pulmonary disease (COPD). This is a long-term lung condition that makes it hard to breathe. It also gets worse over time.  High blood pressure (hypertension), heart disease, stroke, heart attack, and lung infections, such as pneumonia.  Cataracts. This is when the lenses in the eyes become clouded.  Digestive problems. This may include peptic ulcers, heartburn, and gastroesophageal reflux disease (GERD).  Oral health problems, such as gum disease, mouth sores, and tooth loss.  Loss of taste and smell.  Smoking also affects how you look and smell. Smoking may cause:  Wrinkles.  Yellow or stained teeth, fingers, and fingernails.  Bad breath.  Bad-smelling clothes and hair.  Smoking tobacco can also affect your social life, because:  It may be challenging to find places to smoke when away from home. Many workplaces, restaurants, hotels, and public places are tobacco-free.  Smoking is expensive. This is due to the cost of tobacco and the long-term costs of treating health problems from smoking.  Secondhand smoke may affect those around you. Secondhand smoke can cause lung cancer, breathing problems, and heart disease. Children of smokers have a higher risk for:  Sudden infant death syndrome (SIDS).  Ear infections.  Lung infections.  What " actions can I take to prevent health problems?  Quit smoking    Do not start smoking. Quit if you already smoke.  Do not replace cigarette smoking with vaping devices, such as e-cigarettes.  Make a plan to quit smoking and commit to it. Look for programs to help you, and ask your health care provider for recommendations and ideas. Set a date and write down all the reasons you want to quit.  Let your friends and family know you are quitting so they can help and support you. Consider finding friends who also want to quit. It can be easier to quit with someone else, so that you can support each other.  Talk with your health care provider about using nicotine replacement medicines to help you quit. These include gum, lozenges, patches, sprays, or pills.  If you try to quit but return to smoking, stay positive. It is common to slip up when you first quit, so take it one day at a time.  Be prepared for cravings. When you feel the urge to smoke, chew gum or suck on hard candy.  Lifestyle  Stay busy.  Take care of your body. Get plenty of exercise, eat a healthy diet, and drink plenty of water.  Find ways to manage your stress, such as meditation, yoga, exercise, or time spent with friends and family.  Ask your health care provider about having regular tests (screenings) to check for cancer. This may include blood tests, imaging tests, and other tests.  Where to find support  To get support to quit smoking, consider:  Asking your health care provider for more information and resources.  Joining a support group for people who want to quit smoking in your local community. There are many effective programs that may help you to quit.  Calling the smokefree.gov counselor helpline at 8-573-QUIT-NOW (1-364.653.1019).  Where to find more information  You may find more information about quitting smoking from:  Centers for Disease Control and Prevention: cdc.gov/tobacco  Smokefree.gov: smokefree.gov  American Lung Association:  [employer]  Contact a health care provider if:  You have problems breathing.  Your lips, nose, or fingers turn blue.  You have chest pain.  You are coughing up blood.  You feel like you will faint.  You have other health changes that cause you to worry.  Summary  Smoking tobacco can negatively affect your health, the health of those around you, your finances, and your social life.  Do not start smoking. Quit if you already smoke. If you need help quitting, ask your health care provider.  Consider joining a support group for people in your local community who want to quit smoking. There are many effective programs that may help you to quit.  This information is not intended to replace advice given to you by your health care provider. Make sure you discuss any questions you have with your health care provider.  Document Revised: 12/13/2022 Document Reviewed: 12/13/2022  Elsevier Patient Education © 2023 Elsevier Inc.

## 2024-04-09 DIAGNOSIS — I10 PRIMARY HYPERTENSION: ICD-10-CM

## 2024-04-09 RX ORDER — LISINOPRIL 20 MG/1
20 TABLET ORAL DAILY
Qty: 90 TABLET | Refills: 3 | Status: SHIPPED | OUTPATIENT
Start: 2024-04-09

## 2024-04-09 NOTE — TELEPHONE ENCOUNTER
Rx Refill Note  Requested Prescriptions     Pending Prescriptions Disp Refills    lisinopril (PRINIVIL,ZESTRIL) 20 MG tablet 90 tablet 0     Sig: Take 1 tablet by mouth Daily.      Last office visit with prescribing clinician: Visit date not found   Last telemedicine visit with prescribing clinician: 4/2/2024   Next office visit with prescribing clinician: Visit date not found                         Would you like a call back once the refill request has been completed: [] Yes [] No    If the office needs to give you a call back, can they leave a voicemail: [] Yes [] No    Roxana Gonzalez MA  04/09/24, 09:47 EDT

## 2024-05-13 DIAGNOSIS — K21.9 GASTROESOPHAGEAL REFLUX DISEASE, UNSPECIFIED WHETHER ESOPHAGITIS PRESENT: ICD-10-CM

## 2024-05-13 RX ORDER — PANTOPRAZOLE SODIUM 20 MG/1
20 TABLET, DELAYED RELEASE ORAL DAILY
Qty: 90 TABLET | Refills: 0 | Status: SHIPPED | OUTPATIENT
Start: 2024-05-13

## 2024-05-13 NOTE — TELEPHONE ENCOUNTER
Rx Refill Note  Requested Prescriptions     Pending Prescriptions Disp Refills    pantoprazole (PROTONIX) 20 MG EC tablet 90 tablet 0     Sig: Take 1 tablet by mouth Daily.      Last office visit with prescribing clinician: 3/29/2023   Last telemedicine visit with prescribing clinician: 4/2/2024   Next office visit with prescribing clinician: 8/6/2024                         Would you like a call back once the refill request has been completed: [] Yes [] No    If the office needs to give you a call back, can they leave a voicemail: [] Yes [] No    Roxana Gonzalez MA  05/13/24, 12:15 EDT

## 2024-05-29 ENCOUNTER — TELEMEDICINE (OUTPATIENT)
Dept: FAMILY MEDICINE CLINIC | Facility: TELEHEALTH | Age: 34
End: 2024-05-29
Payer: COMMERCIAL

## 2024-05-29 DIAGNOSIS — J20.9 ACUTE BRONCHITIS, UNSPECIFIED ORGANISM: Primary | ICD-10-CM

## 2024-05-29 RX ORDER — BENZONATATE 200 MG/1
200 CAPSULE ORAL 3 TIMES DAILY PRN
Qty: 30 CAPSULE | Refills: 0 | Status: SHIPPED | OUTPATIENT
Start: 2024-05-29 | End: 2024-06-08

## 2024-05-29 RX ORDER — PREDNISONE 10 MG/1
TABLET ORAL
Qty: 1 EACH | Refills: 0 | Status: SHIPPED | OUTPATIENT
Start: 2024-05-29

## 2024-05-29 NOTE — PROGRESS NOTES
Chief Complaint   Patient presents with    Cough       Video Visit Reason:   Free Text Description: Cough  Subjective   Rahul Vasquez Walters is a 33 y.o. male.     History of Present Illness  He had some sinus congestion, Headache, dizziness and congestion over the weekend that he thought was turning into a sinus infection but the sinus symptoms improved. Over the last 2 days, he has had increasing dry cough with chest pain. He has fatigue but no muscle aches, fever, chills or shortness of breath. No wheezing. He has asthma and seasonal allergies with some drainage but the chest discomfort with the cough is worse than usual.  Cough  Associated symptoms include chest pain, nasal congestion, postnasal drip and a sore throat. Pertinent negatives include no chills, ear congestion, fever, headaches, myalgias, shortness of breath or wheezing. Risk factors for lung disease include smoking/tobacco exposure. His past medical history is significant for asthma and environmental allergies.       The following portions of the patient's history were reviewed and updated as appropriate: allergies, current medications, past medical history, and problem list.      Past Medical History:   Diagnosis Date    Dental caries     GERD (gastroesophageal reflux disease)     Gingivitis     HTN (hypertension)     Hypertension     Obesity     Periodontal disease     Tobacco dependence      Social History     Socioeconomic History    Marital status:      Spouse name: Suni    Number of children: 2    Years of education: H.S.    Highest education level: High school graduate   Tobacco Use    Smoking status: Heavy Smoker     Current packs/day: 1.50     Average packs/day: 1.5 packs/day for 12.0 years (18.0 ttl pk-yrs)     Types: Cigarettes     Passive exposure: Never    Smokeless tobacco: Never   Vaping Use    Vaping status: Former   Substance and Sexual Activity    Alcohol use: No    Drug use: No    Sexual activity: Yes     Partners:  Female     Birth control/protection: Tubal ligation     medication documentation: reviewed and updated with patient and   Current Outpatient Medications:     albuterol sulfate  (90 Base) MCG/ACT inhaler, Inhale 2 puffs Every 4 (Four) Hours As Needed for Wheezing or Shortness of Air., Disp: 18 g, Rfl: 0    famotidine (PEPCID) 40 MG tablet, Take 1 tablet by mouth Daily., Disp: 14 tablet, Rfl: 0    fluticasone (FLONASE) 50 MCG/ACT nasal spray, 2 sprays into the nostril(s) as directed by provider Daily., Disp: 16 mL, Rfl: 0    lisinopril (PRINIVIL,ZESTRIL) 20 MG tablet, Take 1 tablet by mouth Daily., Disp: 90 tablet, Rfl: 3    pantoprazole (PROTONIX) 20 MG EC tablet, Take 1 tablet by mouth Daily., Disp: 90 tablet, Rfl: 0    Symbicort 80-4.5 MCG/ACT inhaler, , Disp: , Rfl:     benzonatate (TESSALON) 200 MG capsule, Take 1 capsule by mouth 3 (Three) Times a Day As Needed for Cough for up to 10 days., Disp: 30 capsule, Rfl: 0    predniSONE (DELTASONE) 10 MG (48) dose pack, Take as directed., Disp: 1 each, Rfl: 0    sildenafil (VIAGRA) 25 MG tablet, Take 0.5 to 4 tablets as needed for erectile dysfunction 1 to 2 hours before sexual activity.  Do not exceed more than 4 tablets in a day., Disp: 30 tablet, Rfl: 5    Spacer/Aero-Holding Chambers (AeroChamber Plus Santos-Vu) misc, , Disp: , Rfl:     SUMAtriptan (IMITREX) 100 MG tablet, Take one tablet at onset of headache. May repeat dose one time in 2 hours if headache not relieved., Disp: 9 tablet, Rfl: 5    varenicline (Chantix Continuing Month Rashid) 1 MG tablet, Take 1 tablet by mouth 2 (Two) Times a Day for 56 days. (Patient not taking: Reported on 5/29/2024), Disp: 56 tablet, Rfl: 1  Review of Systems   Constitutional:  Positive for fatigue. Negative for chills and fever.   HENT:  Positive for congestion, postnasal drip, sinus pressure and sore throat.    Respiratory:  Positive for cough and chest tightness. Negative for shortness of breath and wheezing.     Cardiovascular:  Positive for chest pain.   Musculoskeletal:  Negative for myalgias.   Allergic/Immunologic: Positive for environmental allergies.   Neurological:  Negative for headaches.       Objective   Physical Exam  Constitutional:       Appearance: Normal appearance. He is well-developed.   HENT:      Nose: Congestion present.      Right Sinus: Maxillary sinus tenderness present.      Left Sinus: Maxillary sinus tenderness present.   Eyes:      Conjunctiva/sclera: Conjunctivae normal.   Pulmonary:      Effort: Pulmonary effort is normal.   Lymphadenopathy:      Head:      Right side of head: No tonsillar, preauricular or posterior auricular adenopathy.      Left side of head: No tonsillar, preauricular or posterior auricular adenopathy.      Cervical: No cervical adenopathy (patient guided exam).   Psychiatric:         Speech: Speech normal.         Assessment & Plan   Diagnoses and all orders for this visit:    1. Acute bronchitis, unspecified organism (Primary)  -     predniSONE (DELTASONE) 10 MG (48) dose pack; Take as directed.  Dispense: 1 each; Refill: 0  -     benzonatate (TESSALON) 200 MG capsule; Take 1 capsule by mouth 3 (Three) Times a Day As Needed for Cough for up to 10 days.  Dispense: 30 capsule; Refill: 0                    Follow Up:  If your symptoms are not resolving by the completion of your treatment or are worsening, see your primary care provider for follow up. If you don't have a primary care provider, you may go to any Urgent Care for re-evaluation. If you develop any life threatening symptoms, go to the nearest Emergency Department immediately or call EMS.               The use of  Video Visit was utilized during this visit, using both [employer] and [employer]/Epic. The use of a video visit has been reviewed with the patient and verbal informed consent has been obtained. No technical difficulties occurred during the visit.    is located at [address] [place], [place] [id number]  Provider  is located at [place], [place]

## 2024-06-03 DIAGNOSIS — J22 LOWER RESPIRATORY INFECTION (E.G., BRONCHITIS, PNEUMONIA, PNEUMONITIS, PULMONITIS): Primary | ICD-10-CM

## 2024-06-03 RX ORDER — AZITHROMYCIN 250 MG/1
TABLET, FILM COATED ORAL
Qty: 6 TABLET | Refills: 0 | Status: SHIPPED | OUTPATIENT
Start: 2024-06-03

## 2024-07-07 DIAGNOSIS — I10 PRIMARY HYPERTENSION: ICD-10-CM

## 2024-07-07 DIAGNOSIS — K21.9 GASTROESOPHAGEAL REFLUX DISEASE, UNSPECIFIED WHETHER ESOPHAGITIS PRESENT: ICD-10-CM

## 2024-07-07 RX ORDER — LISINOPRIL 20 MG/1
20 TABLET ORAL DAILY
Qty: 90 TABLET | Refills: 3 | Status: CANCELLED | OUTPATIENT
Start: 2024-07-07

## 2024-07-08 RX ORDER — PANTOPRAZOLE SODIUM 20 MG/1
20 TABLET, DELAYED RELEASE ORAL DAILY
Qty: 90 TABLET | Refills: 0 | Status: SHIPPED | OUTPATIENT
Start: 2024-07-08

## 2024-07-24 ENCOUNTER — OFFICE VISIT (OUTPATIENT)
Dept: NEUROLOGY | Facility: CLINIC | Age: 34
End: 2024-07-24
Payer: COMMERCIAL

## 2024-07-24 VITALS
WEIGHT: 237 LBS | HEART RATE: 76 BPM | SYSTOLIC BLOOD PRESSURE: 128 MMHG | OXYGEN SATURATION: 97 % | HEIGHT: 71 IN | BODY MASS INDEX: 33.18 KG/M2 | DIASTOLIC BLOOD PRESSURE: 88 MMHG

## 2024-07-24 DIAGNOSIS — R29.898 WEAKNESS OF BOTH LOWER EXTREMITIES: ICD-10-CM

## 2024-07-24 DIAGNOSIS — R20.0 NUMBNESS: ICD-10-CM

## 2024-07-24 DIAGNOSIS — R27.0 ATAXIA: Primary | ICD-10-CM

## 2024-07-24 DIAGNOSIS — R56.9 SEIZURE-LIKE ACTIVITY: ICD-10-CM

## 2024-07-24 PROCEDURE — 3074F SYST BP LT 130 MM HG: CPT | Performed by: NURSE PRACTITIONER

## 2024-07-24 PROCEDURE — 1160F RVW MEDS BY RX/DR IN RCRD: CPT | Performed by: NURSE PRACTITIONER

## 2024-07-24 PROCEDURE — 3079F DIAST BP 80-89 MM HG: CPT | Performed by: NURSE PRACTITIONER

## 2024-07-24 PROCEDURE — 1159F MED LIST DOCD IN RCRD: CPT | Performed by: NURSE PRACTITIONER

## 2024-07-24 PROCEDURE — 99214 OFFICE O/P EST MOD 30 MIN: CPT | Performed by: NURSE PRACTITIONER

## 2024-07-24 NOTE — LETTER
2024     Boom Benoit MD  [address] [place], [place] [id number]    Patient: Rahul Walters   YOB: 1990   Date of Visit: 2024     Dear Boom Benoit MD:       Thank you for referring Rahul Walters to me for evaluation. Below are the relevant portions of my assessment and plan of care.    If you have questions, please do not hesitate to call me. I look forward to following Rahul along with you.         Sincerely,        Dejan Chahal DNP, KRIS        CC: No Recipients    Dejan Chahal DNP, KRIS  24 [id number]  Signed     Neuro Office Visit      Encounter Date: 2024   Patient Name: Rahul Walters  : 1990   MRN: [id number]   PCP: Dr Benoit  Chief Complaint:    Chief Complaint   Patient presents with   • Numbness   • Seizure-like activity       History of Present Illness: Rahul Walters is a 33 y.o. male who is here today in Neurology for  numbness and ataxia.    History of Present Illness  The patient presents for evaluation of multiple medical concerns. He is accompanied by his wife.      Multiple neurologic complaints.  The patient has been grappling with ataxia, migraines for the past 30 years. His son, who is 5-year-old, has recently undergone genetic testing, revealing a missing gene. 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     His symptoms, which began at the age of 5, have progressively worsened over the years, to the extent that he is no longer employed, staying at home with his children. His condition fluctuates daily, with some days being asymptomatic and others experiencing difficulty leaving the bed or couch due to leg weakness. He describes a sensation of his legs falling asleep while sitting and a sensation of a lack of control upon standing and walking. He denies any physical numbness, but reports a strange sensation between his legs.     He  occasionally experiences a tingling sensation shooting through his leg, either or both, during an attack, which lasts for a few minutes to an hour. He denies any associated injuries from a fall. His attacks, which are not painful, are characterized as being defensive, angry, and slurred speech. He also reports occasional vision changes during attacks, but denies any tongue biting. He occasionally experiences numbness and tingling in his wrist and hands, weakness in his hands, and dropping items. Despite these symptoms, he is able to drive a car and has not had any accidents. He denies any episodes of falling to the ground or being unconscious. He denies any history of seizures, tongue biting, or loss of control of his bladder or bowels. Heat intolerance occasionally results in difficulty breathing. He has not been hospitalized or treated for his trouble walking or numbness. He has been on steroids for bronchitis, which did not alleviate his other symptoms. He has not seen a neurologist in the past.     He experiences headaches approximately 5 times a year, which can be severe enough to necessitate migraine medication, which provides relief. He had a CAT scan a few years ago due to headaches, dizziness, and high blood pressure, as informed by the missing gene that he is at risk for seizures for the rest of his life. He denies any history of seizures or full loss of consciousness.  • His father had seizures and a stroke 20 years ago.        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  Developmental delay in cognition, speech, walking, socialization, behavior, seizures, heart murmur    HTN, GERD, diarrhea, ED, headaches, MILVIA, tob abuse, htn periodeontal disease, obesity  FH:etoh abuse, cad, cancer, htn, cva w seizures  SH: +tob, -etoh  Subjective      Past Medical History:   Past Medical History:   Diagnosis  Date   • Dental caries    • GERD (gastroesophageal reflux disease)    • Gingivitis    • HTN (hypertension)    • Hypertension    • Obesity    • Periodontal disease    • Tobacco dependence        Past Surgical History:   Past Surgical History:   Procedure Laterality Date   • TOOTH EXTRACTION  09/28/2022   • WISDOM TOOTH EXTRACTION  2017       Family History:   Family History   Problem Relation Age of Onset   • Heart attack Mother    • Coronary artery disease Mother    • Stroke Father    • Hypertension Father    • No Known Problems Sister    • No Known Problems Brother    • Cancer Maternal Grandmother    • Heart disease Maternal Grandfather    • Hypertension Paternal Grandmother    • Heart attack Paternal Grandmother    • Coronary artery disease Paternal Grandmother    • Alcohol abuse Paternal Grandfather    • No Known Problems Daughter    • No Known Problems Son        Social History:   Social History     Socioeconomic History   • Marital status:      Spouse name: Suni   • Number of children: 2   • Years of education: H.S.   • Highest education level: High school graduate   Tobacco Use   • Smoking status: Heavy Smoker     Current packs/day: 1.50     Average packs/day: 1.5 packs/day for 12.0 years (18.0 ttl pk-yrs)     Types: Cigarettes     Passive exposure: Never   • Smokeless tobacco: Never   Vaping Use   • Vaping status: Former   Substance and Sexual Activity   • Alcohol use: No   • Drug use: No   • Sexual activity: Yes     Partners: Female     Birth control/protection: Tubal ligation       Medications:     Current Outpatient Medications:   •  albuterol sulfate  (90 Base) MCG/ACT inhaler, Inhale 2 puffs Every 4 (Four) Hours As Needed for Wheezing or Shortness of Air., Disp: 18 g, Rfl: 0  •  fluticasone (FLONASE) 50 MCG/ACT nasal spray, 2 sprays into the nostril(s) as directed by provider Daily., Disp: 16 mL, Rfl: 0  •  lisinopril (PRINIVIL,ZESTRIL) 20 MG tablet, Take 1 tablet by mouth Daily., Disp:  90 tablet, Rfl: 3  •  pantoprazole (PROTONIX) 20 MG EC tablet, Take 1 tablet by mouth Daily., Disp: 90 tablet, Rfl: 0  •  Spacer/Aero-Holding Chambers (AeroChamber Plus Santos-Vu) misc, , Disp: , Rfl:   •  SUMAtriptan (IMITREX) 100 MG tablet, Take one tablet at onset of headache. May repeat dose one time in 2 hours if headache not relieved., Disp: 9 tablet, Rfl: 5  •  Symbicort 80-4.5 MCG/ACT inhaler, , Disp: , Rfl:     Allergies:   Allergies   Allergen Reactions   • Latex Rash       PHQ-9 Total Score:     STEADI Fall Risk Assessment has not been completed.    Objective     Physical Exam:   Physical Exam  Eyes:      Pupils: Pupils are equal, round, and reactive to light.   Neurological:      Mental Status: He is oriented to person, place, and time.      Coordination: Finger-Nose-Finger Test, Heel to Shin Test and Romberg Test normal.      Gait: Tandem walk normal.      Deep Tendon Reflexes:      Reflex Scores:       Tricep reflexes are 2+ on the right side and 2+ on the left side.       Bicep reflexes are 2+ on the right side and 2+ on the left side.       Brachioradialis reflexes are 2+ on the right side and 2+ on the left side.       Patellar reflexes are 2+ on the right side and 2+ on the left side.       Achilles reflexes are 2+ on the right side and 2+ on the left side.  Psychiatric:         Speech: Speech normal.         Neurologic Exam     Mental Status   Oriented to person, place, and time.   Follows 3 step commands.   Attention: normal. Concentration: normal.   Speech: speech is normal   Level of consciousness: alert  Knowledge: consistent with education.   Normal comprehension.     Cranial Nerves     CN III, IV, VI   Pupils are equal, round, and reactive to light.  Right pupil: Accommodation: intact.   Left pupil: Accommodation: intact.   CN III: no CN III palsy  CN VI: no CN VI palsy  Nystagmus: none   Diplopia: none  Upgaze: normal  Downgaze: normal  Conjugate gaze: present    CN VII   Facial expression  "full, symmetric.     CN VIII   Hearing: intact    CN XII   CN XII normal.     Motor Exam   Muscle bulk: normal  Overall muscle tone: normal    Strength   Right biceps: 5/5  Left biceps: 5/5  Right triceps: 5/5  Left triceps: 5/5  Right interossei: 5/5  Left interossei: 5/5  Right quadriceps: 4/5  Left quadriceps: 4/5  Right anterior tibial: 4/5  Left anterior tibial: 4/5  Right posterior tibial: 4/5  Left posterior tibial: 4/5    Sensory Exam   Right arm light touch: normal  Left arm light touch: normal  Right leg light touch: decreased from toes  Left leg light touch: decreased from toes    Gait, Coordination, and Reflexes     Coordination   Romberg: negative  Finger to nose coordination: normal  Heel to shin coordination: normal  Tandem walking coordination: normal    Tremor   Resting tremor: absent  Action tremor: absent    Reflexes   Right brachioradialis: 2+  Left brachioradialis: 2+  Right biceps: 2+  Left biceps: 2+  Right triceps: 2+  Left triceps: 2+  Right patellar: 2+  Left patellar: 2+  Right achilles: 2+  Left achilles: 2+  Right : 2+  Left : 2+External rotation of bilat feet w marching gait.      Physical Exam        Vital Signs:   Vitals:    07/24/24 0850   BP: 128/88   Pulse: 76   SpO2: 97%   Weight: 108 kg (237 lb)   Height: 180.3 cm (71\")     Body mass index is 33.05 kg/m².         Assessment / Plan      Assessment/Plan:   Diagnoses and all orders for this visit:    1. Ataxia (Primary)  -     MRI Brain With & Without Contrast; Future  -     EEG (Hospital Performed); Future  -     EMG & Nerve Conduction Test; Future  -     JUVE by IFA, Reflex 9-biomarkers profile; Future  -     Angiotensin Converting Enzyme; Future  -     Anti-Myelin Oligodendrocyte Glycoprotein (MOG), Serum; Future  -     Neuromyelitis Optica (NMO) Auto Antibody, IgG; Future  -     Cardiolipin Antibody; Future  -     Rheumatoid Factor; Future  -     Sedimentation Rate; Future  -     Vitamin B12 & Folate; Future  -     " Vitamin D,25-Hydroxy; Future  -     CK; Future  -     C-reactive Protein; Future  -     ANJUM + PE; Future    2. Seizure-like activity  -     MRI Brain With & Without Contrast; Future  -     EEG (Hospital Performed); Future  -     EMG & Nerve Conduction Test; Future  -     JUVE by IFA, Reflex 9-biomarkers profile; Future  -     Angiotensin Converting Enzyme; Future  -     Anti-Myelin Oligodendrocyte Glycoprotein (MOG), Serum; Future  -     Neuromyelitis Optica (NMO) Auto Antibody, IgG; Future  -     Cardiolipin Antibody; Future  -     Rheumatoid Factor; Future  -     Sedimentation Rate; Future  -     Vitamin B12 & Folate; Future  -     Vitamin D,25-Hydroxy; Future  -     CK; Future  -     C-reactive Protein; Future  -     ANJUM + PE; Future    3. Numbness  -     MRI Brain With & Without Contrast; Future  -     EEG (Hospital Performed); Future  -     EMG & Nerve Conduction Test; Future  -     JUVE by IFA, Reflex 9-biomarkers profile; Future  -     Angiotensin Converting Enzyme; Future  -     Anti-Myelin Oligodendrocyte Glycoprotein (MOG), Serum; Future  -     Neuromyelitis Optica (NMO) Auto Antibody, IgG; Future  -     Cardiolipin Antibody; Future  -     Rheumatoid Factor; Future  -     Sedimentation Rate; Future  -     Vitamin B12 & Folate; Future  -     Vitamin D,25-Hydroxy; Future  -     CK; Future  -     C-reactive Protein; Future  -     ANJUM + PE; Future    4. Weakness of both lower extremities  -     MRI Brain With & Without Contrast; Future  -     EEG (Hospital Performed); Future  -     EMG & Nerve Conduction Test; Future  -     JUVE by IFA, Reflex 9-biomarkers profile; Future  -     Angiotensin Converting Enzyme; Future  -     Anti-Myelin Oligodendrocyte Glycoprotein (MOG), Serum; Future  -     Neuromyelitis Optica (NMO) Auto Antibody, IgG; Future  -     Cardiolipin Antibody; Future  -     Rheumatoid Factor; Future  -     Sedimentation Rate; Future  -     Vitamin B12 & Folate; Future  -     Vitamin D,25-Hydroxy;  Future  -     CK; Future  -     C-reactive Protein; Future  -     ANJUM + PE; Future       Assessment & Plan  1. Ataxia, seizure-like activity, paresthesia, and leg weakness.  An MRI of the brain, seizure test, nerve and muscle test, and blood work will be ordered. An EEG will be performed. A nerve conduction test will be performed. Inflammatory markers, vitamin B12, folate, and vitamin D will be checked. An JUVE test will be performed.    Follow-up  A follow-up appointment is scheduled for 3 months from now.        Patient Education:       Reviewed medications, potential side effects and signs and symptoms to report. Discussed risk versus benefits of treatment plan with patient and/or family-including medications, labs and radiology that may be ordered. Addressed questions and concerns during visit. Patient and/or family verbalized understanding and agree with plan. Instructed to call the office with any questions and report to ER with any life-threatening symptoms.     Follow Up:   Return in about 3 months (around 10/24/2024) for Recheck.    During this visit the following were done:  Labs Reviewed [x]    Labs Ordered [x]    Radiology Reports Reviewed []    Radiology Ordered [x]    PCP Records Reviewed [x]    Referring Provider Records Reviewed []    ER Records Reviewed []    Hospital Records Reviewed []    History Obtained From Family []    Radiology Images Reviewed []    Other Reviewed [x]    Records Requested []      Patient or patient representative verbalized consent for the use of Ambient Listening during the visit with  Dejan Chahal DNP, KRIS for chart documentation. 7/24/2024  09:00 EDT      Dejan Chahal DNP, APRN

## 2024-07-24 NOTE — PROGRESS NOTES
Neuro Office Visit      Encounter Date: 2024   Patient Name: Rahul Walters  : 1990   MRN: [id number]   PCP: Dr Benoit  Chief Complaint:    Chief Complaint   Patient presents with    Numbness    Seizure-like activity       History of Present Illness: Rahul Walters is a 33 y.o. male who is here today in Neurology for  numbness and ataxia.    History of Present Illness  The patient presents for evaluation of multiple medical concerns. He is accompanied by his wife.      Multiple neurologic complaints.  The patient has been grappling with ataxia, migraines for the past 30 years. His son, who is 5-year-old, has recently undergone genetic testing, revealing a missing gene. 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     His symptoms, which began at the age of 5, have progressively worsened over the years, to the extent that he is no longer employed, staying at home with his children. His condition fluctuates daily, with some days being asymptomatic and others experiencing difficulty leaving the bed or couch due to leg weakness. He describes a sensation of his legs falling asleep while sitting and a sensation of a lack of control upon standing and walking. He denies any physical numbness, but reports a strange sensation between his legs.     He occasionally experiences a tingling sensation shooting through his leg, either or both, during an attack, which lasts for a few minutes to an hour. He denies any associated injuries from a fall. His attacks, which are not painful, are characterized as being defensive, angry, and slurred speech. He also reports occasional vision changes during attacks, but denies any tongue biting. He occasionally experiences numbness and tingling in his wrist and hands, weakness in his hands, and dropping items. Despite these symptoms, he is able to drive a car and has not had any accidents. He denies any episodes of  falling to the ground or being unconscious. He denies any history of seizures, tongue biting, or loss of control of his bladder or bowels. Heat intolerance occasionally results in difficulty breathing. He has not been hospitalized or treated for his trouble walking or numbness. He has been on steroids for bronchitis, which did not alleviate his other symptoms. He has not seen a neurologist in the past.     He experiences headaches approximately 5 times a year, which can be severe enough to necessitate migraine medication, which provides relief. He had a CAT scan a few years ago due to headaches, dizziness, and high blood pressure, as informed by the missing gene that he is at risk for seizures for the rest of his life. He denies any history of seizures or full loss of consciousness.   His father had seizures and a stroke 20 years ago.        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  Developmental delay in cognition, speech, walking, socialization, behavior, seizures, heart murmur    HTN, GERD, diarrhea, ED, headaches, MILVIA, tob abuse, htn periodeontal disease, obesity  FH:etoh abuse, cad, cancer, htn, cva w seizures  SH: +tob, -etoh  Subjective      Past Medical History:   Past Medical History:   Diagnosis Date    Dental caries     GERD (gastroesophageal reflux disease)     Gingivitis     HTN (hypertension)     Hypertension     Obesity     Periodontal disease     Tobacco dependence        Past Surgical History:   Past Surgical History:   Procedure Laterality Date    TOOTH EXTRACTION  09/28/2022    WISDOM TOOTH EXTRACTION  2017       Family History:   Family History   Problem Relation Age of Onset    Heart attack Mother     Coronary artery disease Mother     Stroke Father     Hypertension Father     No Known Problems Sister     No Known Problems Brother     Cancer Maternal Grandmother     Heart disease  Maternal Grandfather     Hypertension Paternal Grandmother     Heart attack Paternal Grandmother     Coronary artery disease Paternal Grandmother     Alcohol abuse Paternal Grandfather     No Known Problems Daughter     No Known Problems Son        Social History:   Social History     Socioeconomic History    Marital status:      Spouse name: Suni    Number of children: 2    Years of education: H.S.    Highest education level: High school graduate   Tobacco Use    Smoking status: Heavy Smoker     Current packs/day: 1.50     Average packs/day: 1.5 packs/day for 12.0 years (18.0 ttl pk-yrs)     Types: Cigarettes     Passive exposure: Never    Smokeless tobacco: Never   Vaping Use    Vaping status: Former   Substance and Sexual Activity    Alcohol use: No    Drug use: No    Sexual activity: Yes     Partners: Female     Birth control/protection: Tubal ligation       Medications:     Current Outpatient Medications:     albuterol sulfate  (90 Base) MCG/ACT inhaler, Inhale 2 puffs Every 4 (Four) Hours As Needed for Wheezing or Shortness of Air., Disp: 18 g, Rfl: 0    fluticasone (FLONASE) 50 MCG/ACT nasal spray, 2 sprays into the nostril(s) as directed by provider Daily., Disp: 16 mL, Rfl: 0    lisinopril (PRINIVIL,ZESTRIL) 20 MG tablet, Take 1 tablet by mouth Daily., Disp: 90 tablet, Rfl: 3    pantoprazole (PROTONIX) 20 MG EC tablet, Take 1 tablet by mouth Daily., Disp: 90 tablet, Rfl: 0    Spacer/Aero-Holding Chambers (AeroChamber Plus Santos-Vu) misc, , Disp: , Rfl:     SUMAtriptan (IMITREX) 100 MG tablet, Take one tablet at onset of headache. May repeat dose one time in 2 hours if headache not relieved., Disp: 9 tablet, Rfl: 5    Symbicort 80-4.5 MCG/ACT inhaler, , Disp: , Rfl:     Allergies:   Allergies   Allergen Reactions    Latex Rash       PHQ-9 Total Score:     STEADI Fall Risk Assessment has not been completed.    Objective     Physical Exam:   Physical Exam  Eyes:      Pupils: Pupils are equal,  round, and reactive to light.   Neurological:      Mental Status: He is oriented to person, place, and time.      Coordination: Finger-Nose-Finger Test, Heel to Shin Test and Romberg Test normal.      Gait: Tandem walk normal.      Deep Tendon Reflexes:      Reflex Scores:       Tricep reflexes are 2+ on the right side and 2+ on the left side.       Bicep reflexes are 2+ on the right side and 2+ on the left side.       Brachioradialis reflexes are 2+ on the right side and 2+ on the left side.       Patellar reflexes are 2+ on the right side and 2+ on the left side.       Achilles reflexes are 2+ on the right side and 2+ on the left side.  Psychiatric:         Speech: Speech normal.         Neurologic Exam     Mental Status   Oriented to person, place, and time.   Follows 3 step commands.   Attention: normal. Concentration: normal.   Speech: speech is normal   Level of consciousness: alert  Knowledge: consistent with education.   Normal comprehension.     Cranial Nerves     CN III, IV, VI   Pupils are equal, round, and reactive to light.  Right pupil: Accommodation: intact.   Left pupil: Accommodation: intact.   CN III: no CN III palsy  CN VI: no CN VI palsy  Nystagmus: none   Diplopia: none  Upgaze: normal  Downgaze: normal  Conjugate gaze: present    CN VII   Facial expression full, symmetric.     CN VIII   Hearing: intact    CN XII   CN XII normal.     Motor Exam   Muscle bulk: normal  Overall muscle tone: normal    Strength   Right biceps: 5/5  Left biceps: 5/5  Right triceps: 5/5  Left triceps: 5/5  Right interossei: 5/5  Left interossei: 5/5  Right quadriceps: 4/5  Left quadriceps: 4/5  Right anterior tibial: 4/5  Left anterior tibial: 4/5  Right posterior tibial: 4/5  Left posterior tibial: 4/5    Sensory Exam   Right arm light touch: normal  Left arm light touch: normal  Right leg light touch: decreased from toes  Left leg light touch: decreased from toes    Gait, Coordination, and Reflexes     Coordination  "  Romberg: negative  Finger to nose coordination: normal  Heel to shin coordination: normal  Tandem walking coordination: normal    Tremor   Resting tremor: absent  Action tremor: absent    Reflexes   Right brachioradialis: 2+  Left brachioradialis: 2+  Right biceps: 2+  Left biceps: 2+  Right triceps: 2+  Left triceps: 2+  Right patellar: 2+  Left patellar: 2+  Right achilles: 2+  Left achilles: 2+  Right : 2+  Left : 2+External rotation of bilat feet w marching gait.      Physical Exam        Vital Signs:   Vitals:    07/24/24 0850   BP: 128/88   Pulse: 76   SpO2: 97%   Weight: 108 kg (237 lb)   Height: 180.3 cm (71\")     Body mass index is 33.05 kg/m².         Assessment / Plan      Assessment/Plan:   Diagnoses and all orders for this visit:    1. Ataxia (Primary)  -     MRI Brain With & Without Contrast; Future  -     EEG (Hospital Performed); Future  -     EMG & Nerve Conduction Test; Future  -     JUVE by IFA, Reflex 9-biomarkers profile; Future  -     Angiotensin Converting Enzyme; Future  -     Anti-Myelin Oligodendrocyte Glycoprotein (MOG), Serum; Future  -     Neuromyelitis Optica (NMO) Auto Antibody, IgG; Future  -     Cardiolipin Antibody; Future  -     Rheumatoid Factor; Future  -     Sedimentation Rate; Future  -     Vitamin B12 & Folate; Future  -     Vitamin D,25-Hydroxy; Future  -     CK; Future  -     C-reactive Protein; Future  -     ANJUM + PE; Future    2. Seizure-like activity  -     MRI Brain With & Without Contrast; Future  -     EEG (Hospital Performed); Future  -     EMG & Nerve Conduction Test; Future  -     JUVE by IFA, Reflex 9-biomarkers profile; Future  -     Angiotensin Converting Enzyme; Future  -     Anti-Myelin Oligodendrocyte Glycoprotein (MOG), Serum; Future  -     Neuromyelitis Optica (NMO) Auto Antibody, IgG; Future  -     Cardiolipin Antibody; Future  -     Rheumatoid Factor; Future  -     Sedimentation Rate; Future  -     Vitamin B12 & Folate; Future  -     Vitamin " D,25-Hydroxy; Future  -     CK; Future  -     C-reactive Protein; Future  -     ANJUM + PE; Future    3. Numbness  -     MRI Brain With & Without Contrast; Future  -     EEG (Hospital Performed); Future  -     EMG & Nerve Conduction Test; Future  -     JUVE by IFA, Reflex 9-biomarkers profile; Future  -     Angiotensin Converting Enzyme; Future  -     Anti-Myelin Oligodendrocyte Glycoprotein (MOG), Serum; Future  -     Neuromyelitis Optica (NMO) Auto Antibody, IgG; Future  -     Cardiolipin Antibody; Future  -     Rheumatoid Factor; Future  -     Sedimentation Rate; Future  -     Vitamin B12 & Folate; Future  -     Vitamin D,25-Hydroxy; Future  -     CK; Future  -     C-reactive Protein; Future  -     ANJUM + PE; Future    4. Weakness of both lower extremities  -     MRI Brain With & Without Contrast; Future  -     EEG (Hospital Performed); Future  -     EMG & Nerve Conduction Test; Future  -     JUVE by IFA, Reflex 9-biomarkers profile; Future  -     Angiotensin Converting Enzyme; Future  -     Anti-Myelin Oligodendrocyte Glycoprotein (MOG), Serum; Future  -     Neuromyelitis Optica (NMO) Auto Antibody, IgG; Future  -     Cardiolipin Antibody; Future  -     Rheumatoid Factor; Future  -     Sedimentation Rate; Future  -     Vitamin B12 & Folate; Future  -     Vitamin D,25-Hydroxy; Future  -     CK; Future  -     C-reactive Protein; Future  -     ANJUM + PE; Future       Assessment & Plan  1. Ataxia, seizure-like activity, paresthesia, and leg weakness.  An MRI of the brain, seizure test, nerve and muscle test, and blood work will be ordered. An EEG will be performed. A nerve conduction test will be performed. Inflammatory markers, vitamin B12, folate, and vitamin D will be checked. An JUVE test will be performed.    Follow-up  A follow-up appointment is scheduled for 3 months from now.        Patient Education:       Reviewed medications, potential side effects and signs and symptoms to report. Discussed risk versus benefits  of treatment plan with patient and/or family-including medications, labs and radiology that may be ordered. Addressed questions and concerns during visit. Patient and/or family verbalized understanding and agree with plan. Instructed to call the office with any questions and report to ER with any life-threatening symptoms.     Follow Up:   Return in about 3 months (around 10/24/2024) for Recheck.    During this visit the following were done:  Labs Reviewed [x]    Labs Ordered [x]    Radiology Reports Reviewed []    Radiology Ordered [x]    PCP Records Reviewed [x]    Referring Provider Records Reviewed []    ER Records Reviewed []    Hospital Records Reviewed []    History Obtained From Family []    Radiology Images Reviewed []    Other Reviewed [x]    Records Requested []      Patient or patient representative verbalized consent for the use of Ambient Listening during the visit with  Dejan Chahal DNP, APRN for chart documentation. 7/24/2024  09:00 EDT      Dejan Chahal DNP, APRN

## 2024-08-16 ENCOUNTER — TELEMEDICINE (OUTPATIENT)
Dept: FAMILY MEDICINE CLINIC | Facility: TELEHEALTH | Age: 34
End: 2024-08-16
Payer: COMMERCIAL

## 2024-08-16 DIAGNOSIS — J22 LOWER RESPIRATORY INFECTION (E.G., BRONCHITIS, PNEUMONIA, PNEUMONITIS, PULMONITIS): Primary | ICD-10-CM

## 2024-08-16 DIAGNOSIS — R06.02 SHORTNESS OF BREATH: ICD-10-CM

## 2024-08-16 RX ORDER — DEXTROMETHORPHAN HYDROBROMIDE AND PROMETHAZINE HYDROCHLORIDE 15; 6.25 MG/5ML; MG/5ML
5 SYRUP ORAL 4 TIMES DAILY PRN
Qty: 118 ML | Refills: 0 | Status: SHIPPED | OUTPATIENT
Start: 2024-08-16

## 2024-08-16 RX ORDER — METHYLPREDNISOLONE 4 MG/1
TABLET ORAL
Qty: 21 TABLET | Refills: 0 | Status: SHIPPED | OUTPATIENT
Start: 2024-08-16

## 2024-08-16 RX ORDER — GUAIFENESIN AND DEXTROMETHORPHAN HYDROBROMIDE 600; 30 MG/1; MG/1
1 TABLET, EXTENDED RELEASE ORAL 2 TIMES DAILY PRN
Qty: 20 TABLET | Refills: 0 | Status: SHIPPED | OUTPATIENT
Start: 2024-08-16

## 2024-08-16 RX ORDER — AMOXICILLIN 875 MG/1
875 TABLET, COATED ORAL 2 TIMES DAILY
Qty: 20 TABLET | Refills: 0 | Status: SHIPPED | OUTPATIENT
Start: 2024-08-16

## 2024-08-16 NOTE — PROGRESS NOTES
You have chosen to receive care through a telehealth visit.  Do you consent to use a video/audio connection for your medical care today? Yes     HPI  Rahul Walters is a 33 y.o. male  presents with complaint of sore throat which has lasted 1 days. Then he develped shortness of breath and a deep congested cough with discomfort in his chest during cough and deep inspiration. He does have Asthma and is a tobacco user. He has albuterol which has helped some and he is using cough drops. He is coughing up phlegm but he does not know the color. He has not fever.     Review of Systems   Constitutional: Negative.    HENT:  Positive for congestion and sore throat. Sneezing: now resolved.   Respiratory:  Positive for cough and shortness of breath. Negative for wheezing.    Cardiovascular: Negative.    Gastrointestinal: Negative.    Musculoskeletal: Negative.    Skin: Negative.    Neurological: Negative.    Hematological: Negative.    Psychiatric/Behavioral: Negative.         Past Medical History:   Diagnosis Date    Dental caries     GERD (gastroesophageal reflux disease)     Gingivitis     HTN (hypertension)     Hypertension     Obesity     Periodontal disease     Tobacco dependence        Family History   Problem Relation Age of Onset    Heart attack Mother     Coronary artery disease Mother     Stroke Father     Hypertension Father     No Known Problems Sister     No Known Problems Brother     Cancer Maternal Grandmother     Heart disease Maternal Grandfather     Hypertension Paternal Grandmother     Heart attack Paternal Grandmother     Coronary artery disease Paternal Grandmother     Alcohol abuse Paternal Grandfather     No Known Problems Daughter     No Known Problems Son        Social History     Socioeconomic History    Marital status:      Spouse name: Suni    Number of children: 2    Years of education: H.S.    Highest education level: High school graduate   Tobacco Use    Smoking status: Heavy  Smoker     Current packs/day: 1.50     Average packs/day: 1.5 packs/day for 12.0 years (18.0 ttl pk-yrs)     Types: Cigarettes     Passive exposure: Never    Smokeless tobacco: Never   Vaping Use    Vaping status: Former   Substance and Sexual Activity    Alcohol use: No    Drug use: No    Sexual activity: Yes     Partners: Female     Birth control/protection: Tubal ligation         There were no vitals taken for this visit.    PHYSICAL EXAM  Physical Exam   Constitutional: He is oriented to person, place, and time. He appears well-developed and well-nourished. He does not have a sickly appearance. He does not appear ill. No distress.   HENT:   Head: Normocephalic and atraumatic.   Pulmonary/Chest: Effort normal. No accessory muscle usage or stridor. No tachypnea and no bradypnea.  No respiratory distress. He no audible wheeze...  Neurological: He is alert and oriented to person, place, and time.   Psychiatric: He has a normal mood and affect.   Vitals reviewed.      Diagnoses and all orders for this visit:    1. Lower respiratory infection (e.g., bronchitis, pneumonia, pneumonitis, pulmonitis) (Primary)  -     amoxicillin (AMOXIL) 875 MG tablet; Take 1 tablet by mouth 2 (Two) Times a Day.  Dispense: 20 tablet; Refill: 0  -     promethazine-dextromethorphan (PROMETHAZINE-DM) 6.25-15 MG/5ML syrup; Take 5 mL by mouth 4 (Four) Times a Day As Needed for Cough.  Dispense: 118 mL; Refill: 0  -     guaifenesin-dextromethorphan 600-30 mg (MUCINEX DM)  MG tablet sustained-release 12 hour; Take 1 tablet by mouth 2 (Two) Times a Day As Needed (cough and congestion). Take each dose with a full glass of water.  Dispense: 20 tablet; Refill: 0    2. Shortness of breath  -     methylPREDNISolone (MEDROL) 4 MG dose pack; Take as directed on package instructions.  Dispense: 21 tablet; Refill: 0    Continue albuterol as directed rinse mouth after each use of an inhaler.   Increase fluids and rest.       FOLLOW-UP  As discussed  during visit with [facility], if symptoms worsen or fail to improve, follow-up with PCP/Urgent Care/Emergency Department.    Patient verbalizes understanding of medications, instructions for treatment and follow-up.    KRIS Cox  08/16/2024  08:29 EDT    The use of a video visit has been reviewed with the patient and verbal informed consent has been obtained. Myself and Rahul Walters participated in this visit. The patient is located in [facility], and I am located in [place], [place]. [employer] and [employer]  were utilized.

## 2024-10-05 ENCOUNTER — TELEMEDICINE (OUTPATIENT)
Dept: FAMILY MEDICINE CLINIC | Facility: TELEHEALTH | Age: 34
End: 2024-10-05
Payer: COMMERCIAL

## 2024-10-05 VITALS — BODY MASS INDEX: 33.18 KG/M2 | TEMPERATURE: 98.4 F | WEIGHT: 237 LBS | HEIGHT: 71 IN

## 2024-10-05 DIAGNOSIS — K21.9 GASTROESOPHAGEAL REFLUX DISEASE, UNSPECIFIED WHETHER ESOPHAGITIS PRESENT: ICD-10-CM

## 2024-10-05 DIAGNOSIS — B37.0 THRUSH: Primary | ICD-10-CM

## 2024-10-05 RX ORDER — DIPHENOXYLATE HYDROCHLORIDE AND ATROPINE SULFATE 2.5; .025 MG/1; MG/1
1 TABLET ORAL DAILY
Qty: 30 TABLET | Refills: 1 | Status: SHIPPED | OUTPATIENT
Start: 2024-10-05

## 2024-10-05 RX ORDER — NYSTATIN 100000 [USP'U]/ML
500000 SUSPENSION ORAL 4 TIMES DAILY
Qty: 240 ML | Refills: 0 | Status: SHIPPED | OUTPATIENT
Start: 2024-10-05 | End: 2024-10-17

## 2024-10-05 NOTE — PROGRESS NOTES
You have chosen to receive care through a telehealth visit.  Do you consent to use a video/audio connection for your medical care today? Yes     HPI  Rahul Walters is a 34 y.o. male  presents with complaint of sore throat.  He reports that his throat is red and sore.  It has been bothering him for a week.  He is wondering if he has strep throat.  No fever.  No headache no body aches. When questioned about his white tongue he reports that h it had been that way for about a year.  When questioned about his reflux he reports it was bothering him last night.  He has taken Tylenol and ibuprofen for his pain.  He does report he gets sick frequently and has been on a lot of antibiotics.  He is wanting to build up his immune system.    Review of Systems   Constitutional:  Positive for chills (at night for last week) and fever (feels like it on occasion).   HENT:  Positive for sore throat. Negative for congestion and postnasal drip.    Respiratory:  Negative for cough (baseline, smoker's cough).    Gastrointestinal:         Reflux bothering him       Past Medical History:   Diagnosis Date    Dental caries     GERD (gastroesophageal reflux disease)     Gingivitis     HTN (hypertension)     Hypertension     Obesity     Periodontal disease     Tobacco dependence        Family History   Problem Relation Age of Onset    Heart attack Mother     Coronary artery disease Mother     Stroke Father     Hypertension Father     No Known Problems Sister     No Known Problems Brother     Cancer Maternal Grandmother     Heart disease Maternal Grandfather     Hypertension Paternal Grandmother     Heart attack Paternal Grandmother     Coronary artery disease Paternal Grandmother     Alcohol abuse Paternal Grandfather     No Known Problems Daughter     No Known Problems Son        Social History     Socioeconomic History    Marital status:      Spouse name: Suni    Number of children: 2    Years of education: H.S.     "Highest education level: High school graduate   Tobacco Use    Smoking status: Heavy Smoker     Current packs/day: 1.50     Average packs/day: 1.5 packs/day for 12.0 years (18.0 ttl pk-yrs)     Types: Cigarettes     Passive exposure: Never    Smokeless tobacco: Never   Vaping Use    Vaping status: Former   Substance and Sexual Activity    Alcohol use: No    Drug use: No    Sexual activity: Yes     Partners: Female     Birth control/protection: Tubal ligation       Rahul Walters  reports that he has been smoking cigarettes. He has a 18 pack-year smoking history. He has never been exposed to tobacco smoke. He has never used smokeless tobacco.Clarification. He is a former vaper and dipper.  I have educated him on the risk of diseases from using tobacco products such as cancer, COPD, and heart disease.     I advised him to quit and he is willing to quit.He does not want to set a quit date today.     Temp 98.4 °F (36.9 °C)   Ht 180.3 cm (71\")   Wt 108 kg (237 lb)   BMI 33.05 kg/m²     PHYSICAL EXAM  Physical Exam   Constitutional: He is oriented to person, place, and time. He appears well-developed.   HENT:   Head: Normocephalic and atraumatic.   Nose: Nose normal.   Mouth/Throat: Mucous membranes are erythematous.   Tongue is coated white, nicotine stains posterior tongue   Eyes: Lids are normal. Right eye exhibits no discharge. Left eye exhibits no discharge. Right conjunctiva is not injected. Left conjunctiva is not injected.   Pulmonary/Chest:  No respiratory distress.  Neurological: He is alert and oriented to person, place, and time. No cranial nerve deficit.   Psychiatric: He has a normal mood and affect. His speech is normal and behavior is normal. Judgment and thought content normal.       Results for orders placed or performed during the hospital encounter of 12/12/23   Wound Culture - Wound, Oropharynx    Specimen: Oropharynx; Wound   Result Value Ref Range    Wound Culture Heavy growth (4+) " Normal Throat Amaris     Gram Stain Few (2+) Epithelial cells seen     Gram Stain No WBCs seen     Gram Stain       Few (2+) Mixed bacterial morphotypes seen on Gram Stain       Diagnoses and all orders for this visit:    1. Thrush (Primary)    Other orders  -     nystatin (MYCOSTATIN) 100,000 unit/mL suspension; Swish and swallow 5 mL 4 (Four) Times a Day for 12 days.  Dispense: 240 mL; Refill: 0  -     multivitamin (Multiple Vitamin) tablet tablet; Take 1 tablet by mouth Daily.  Dispense: 30 tablet; Refill: 1    Nystatin swish and swallow as directed  Probiotics advised and patient reports he does have some on hand  Multivitamin ordered for patient  Listerine mouthwash twice daily advised  Discontinue Pantoprazole advised, start Pepcid over-the-counter    FOLLOW-UP  If symptoms worsen or persist follow up with PCP, [facility] or Urgent Care    Patient verbalizes understanding of medication dosage, comfort measures, instructions for treatment and follow-up.    Brittanie Le, KRIS  10/05/2024  13:19 EDT    The use of a video visit has been reviewed with the patient and verbal informed consent has been obtained. Myself and Rahul Walters participated in this visit. The patient is located in [address] [place] [address].    I am located in [place], [place]. [employer] and [employer] Video Client were utilized. I spent 26 minutes in the patient's chart for this visit.

## 2024-10-15 ENCOUNTER — LAB (OUTPATIENT)
Dept: LAB | Facility: HOSPITAL | Age: 34
End: 2024-10-15
Payer: COMMERCIAL

## 2024-10-15 DIAGNOSIS — R29.898 WEAKNESS OF BOTH LOWER EXTREMITIES: ICD-10-CM

## 2024-10-15 DIAGNOSIS — R20.0 NUMBNESS: ICD-10-CM

## 2024-10-15 DIAGNOSIS — R56.9 SEIZURE-LIKE ACTIVITY: ICD-10-CM

## 2024-10-15 DIAGNOSIS — R27.0 ATAXIA: ICD-10-CM

## 2024-10-15 LAB
25(OH)D3 SERPL-MCNC: 22 NG/ML (ref 30–100)
CHROMATIN AB SERPL-ACNC: <10 IU/ML (ref 0–14)
CK SERPL-CCNC: 79 U/L (ref 20–200)
CRP SERPL-MCNC: 1.17 MG/DL (ref 0–0.5)
ERYTHROCYTE [SEDIMENTATION RATE] IN BLOOD: 18 MM/HR (ref 0–15)
FOLATE SERPL-MCNC: 2.23 NG/ML (ref 4.78–24.2)
VIT B12 BLD-MCNC: 407 PG/ML (ref 211–946)

## 2024-10-15 PROCEDURE — 86235 NUCLEAR ANTIGEN ANTIBODY: CPT

## 2024-10-15 PROCEDURE — 82746 ASSAY OF FOLIC ACID SERUM: CPT

## 2024-10-15 PROCEDURE — 36415 COLL VENOUS BLD VENIPUNCTURE: CPT

## 2024-10-15 PROCEDURE — 85652 RBC SED RATE AUTOMATED: CPT

## 2024-10-15 PROCEDURE — 86053 AQAPRN-4 ANTB FLO CYTMTRY EA: CPT

## 2024-10-15 PROCEDURE — 82306 VITAMIN D 25 HYDROXY: CPT

## 2024-10-15 PROCEDURE — 86431 RHEUMATOID FACTOR QUANT: CPT

## 2024-10-15 PROCEDURE — 86147 CARDIOLIPIN ANTIBODY EA IG: CPT

## 2024-10-15 PROCEDURE — 82164 ANGIOTENSIN I ENZYME TEST: CPT

## 2024-10-15 PROCEDURE — 86140 C-REACTIVE PROTEIN: CPT

## 2024-10-15 PROCEDURE — 82784 ASSAY IGA/IGD/IGG/IGM EACH: CPT

## 2024-10-15 PROCEDURE — 86362 MOG-IGG1 ANTB CBA EACH: CPT

## 2024-10-15 PROCEDURE — 86334 IMMUNOFIX E-PHORESIS SERUM: CPT

## 2024-10-15 PROCEDURE — 84155 ASSAY OF PROTEIN SERUM: CPT

## 2024-10-15 PROCEDURE — 86225 DNA ANTIBODY NATIVE: CPT

## 2024-10-15 PROCEDURE — 82550 ASSAY OF CK (CPK): CPT

## 2024-10-15 PROCEDURE — 84165 PROTEIN E-PHORESIS SERUM: CPT

## 2024-10-15 PROCEDURE — 86038 ANTINUCLEAR ANTIBODIES: CPT

## 2024-10-15 PROCEDURE — 82607 VITAMIN B-12: CPT

## 2024-10-16 LAB
ACE SERPL-CCNC: <15 U/L (ref 14–82)
CARDIOLIPIN IGA SER IA-ACNC: <9 APL U/ML (ref 0–11)
CARDIOLIPIN IGG SER IA-ACNC: 11 GPL U/ML (ref 0–14)
CARDIOLIPIN IGM SER IA-ACNC: <9 MPL U/ML (ref 0–12)

## 2024-10-17 LAB
ALBUMIN SERPL ELPH-MCNC: 4.2 G/DL (ref 2.9–4.4)
ALBUMIN/GLOB SERPL: 1.4 {RATIO} (ref 0.7–1.7)
ALPHA1 GLOB SERPL ELPH-MCNC: 0.3 G/DL (ref 0–0.4)
ALPHA2 GLOB SERPL ELPH-MCNC: 0.9 G/DL (ref 0.4–1)
B-GLOBULIN SERPL ELPH-MCNC: 1.1 G/DL (ref 0.7–1.3)
GAMMA GLOB SERPL ELPH-MCNC: 0.9 G/DL (ref 0.4–1.8)
GLOBULIN SER-MCNC: 3.1 G/DL (ref 2.2–3.9)
IGA SERPL-MCNC: 255 MG/DL (ref 90–386)
IGG SERPL-MCNC: 873 MG/DL (ref 603–1613)
IGM SERPL-MCNC: 90 MG/DL (ref 20–172)
INTERPRETATION SERPL IEP-IMP: NORMAL
LABORATORY COMMENT REPORT: NORMAL
M PROTEIN SERPL ELPH-MCNC: NORMAL G/DL
PROT SERPL-MCNC: 7.3 G/DL (ref 6–8.5)

## 2024-10-18 LAB
ANA HOMOGEN TITR SER: NORMAL {TITER}
ANA SER QL IF: POSITIVE
CENTROMERE B AB SER-ACNC: <0.2 AI (ref 0–0.9)
CHROMATIN AB SERPL-ACNC: <0.2 AI (ref 0–0.9)
DSDNA AB SER-ACNC: <1 IU/ML (ref 0–9)
ENA JO1 AB SER-ACNC: <0.2 AI (ref 0–0.9)
ENA RNP AB SER-ACNC: 0.4 AI (ref 0–0.9)
ENA SCL70 AB SER-ACNC: <0.2 AI (ref 0–0.9)
ENA SM AB SER-ACNC: <0.2 AI (ref 0–0.9)
ENA SS-A AB SER-ACNC: <0.2 AI (ref 0–0.9)
ENA SS-B AB SER-ACNC: <0.2 AI (ref 0–0.9)
LABORATORY COMMENT REPORT: ABNORMAL
Lab: NORMAL
Lab: NORMAL
MOG AB SER QL CBA IFA: NEGATIVE

## 2024-10-20 LAB — AQP4 H2O CHANNEL IGG SERPL QL: NEGATIVE

## 2024-10-21 ENCOUNTER — TELEPHONE (OUTPATIENT)
Dept: NEUROLOGY | Facility: CLINIC | Age: 34
End: 2024-10-21
Payer: COMMERCIAL

## 2024-10-21 DIAGNOSIS — E55.9 VITAMIN D DEFICIENCY: Primary | ICD-10-CM

## 2024-10-21 RX ORDER — ERGOCALCIFEROL 1.25 MG/1
50000 CAPSULE, LIQUID FILLED ORAL WEEKLY
Qty: 12 CAPSULE | Refills: 0 | Status: SHIPPED | OUTPATIENT
Start: 2024-10-21

## 2024-10-21 NOTE — TELEPHONE ENCOUNTER
Called patient and gave results.    ----- Message from Dejan Chahal sent at 10/21/2024 12:19 PM EDT -----  Please notify pt initial JUVE test for autoimmune connective tissue diseases was positive but follow up antibody testing was negative. 30%  of the population will have a positive JUVE with no disease. Autoimmune labs that mimic MS, vit b12, immune labs, anticardiolipin, rheumatoid arthritis, muscle breakdown are all normla.  Inflammatory markers are slightly elevated but not specific to any one disease.  Folate and vit D are low. I am sending in a vit D supplement to take weekly for 3 months, then you will follow up with PCP for repeat vit D labs. Please take an over the counter multiple vitamin to replace your folic acid.

## 2024-10-29 ENCOUNTER — TELEPHONE (OUTPATIENT)
Dept: NEUROLOGY | Facility: CLINIC | Age: 34
End: 2024-10-29

## 2024-12-07 DIAGNOSIS — K21.9 GASTROESOPHAGEAL REFLUX DISEASE, UNSPECIFIED WHETHER ESOPHAGITIS PRESENT: ICD-10-CM

## 2024-12-09 RX ORDER — PANTOPRAZOLE SODIUM 20 MG/1
20 TABLET, DELAYED RELEASE ORAL DAILY
Qty: 90 TABLET | Refills: 0 | Status: SHIPPED | OUTPATIENT
Start: 2024-12-09

## 2024-12-12 DIAGNOSIS — K21.9 GASTROESOPHAGEAL REFLUX DISEASE, UNSPECIFIED WHETHER ESOPHAGITIS PRESENT: ICD-10-CM

## 2024-12-12 RX ORDER — PANTOPRAZOLE SODIUM 20 MG/1
20 TABLET, DELAYED RELEASE ORAL DAILY
Qty: 90 TABLET | Refills: 0 | Status: CANCELLED | OUTPATIENT
Start: 2024-12-12

## 2024-12-19 ENCOUNTER — TELEPHONE (OUTPATIENT)
Dept: CARDIOLOGY | Facility: CLINIC | Age: 34
End: 2024-12-19

## 2024-12-19 NOTE — TELEPHONE ENCOUNTER
Called pt in regards to message stating he was having mild CP, nausea and feeling flush.  PT denies any SOB or dizziness.  Started having issues last night.  PT is requesting an appt to be seen.    Pt is scheduled for 12/31 at 9:30    Advised pt that if symptoms worsen that he needs to be seen in the ER to be evaluated.  PT was agreeable and verbalized understanding.    Please advise with further recommendations

## 2024-12-19 NOTE — TELEPHONE ENCOUNTER
Caller: Rahul Walters    Relationship to patient: Self    Best call back number: [phone]     Chief complaint: MILD CHEST PAIN, NAUSEA, & FLUSH    Type of visit: F/U APPT    Requested date: NEXT AVAILABLE          Additional notes:PLEASE CONTACT PATIENT WITH A SUITABLE DATE.

## 2025-01-10 ENCOUNTER — TELEMEDICINE (OUTPATIENT)
Dept: FAMILY MEDICINE CLINIC | Facility: TELEHEALTH | Age: 35
End: 2025-01-10
Payer: COMMERCIAL

## 2025-01-10 DIAGNOSIS — K06.8 PAIN IN GUMS: ICD-10-CM

## 2025-01-10 DIAGNOSIS — J01.90 ACUTE NON-RECURRENT SINUSITIS, UNSPECIFIED LOCATION: Primary | ICD-10-CM

## 2025-01-10 RX ORDER — PREDNISONE 20 MG/1
20 TABLET ORAL 2 TIMES DAILY
Qty: 10 TABLET | Refills: 0 | Status: SHIPPED | OUTPATIENT
Start: 2025-01-10 | End: 2025-01-15

## 2025-01-10 NOTE — PROGRESS NOTES
Subjective   Chief Complaint   Patient presents with    Sinusitis       Rahul Walters is a 34 y.o. male.     History of Present Illness  Pt reports having a cold this past week with congestion and sinus pressure.  He is now starting to have pain in his gums on the left side top and bottom, this pain radiates up to the left temple.  He currently does not have any teeth due to history of dental infections.  Sinusitis  This is a new problem. Episode onset: 5 days. The problem has been gradually worsening since onset. Associated symptoms include congestion, headaches and sinus pressure. Pertinent negatives include no chills, coughing, diaphoresis, ear pain, neck pain, shortness of breath or sore throat. Treatments tried: mucinex, coricidin. The treatment provided no relief.        Allergies   Allergen Reactions    Latex Rash       Past Medical History:   Diagnosis Date    Dental caries     GERD (gastroesophageal reflux disease)     Gingivitis     HTN (hypertension)     Hypertension     Obesity     Periodontal disease     Tobacco dependence        Past Surgical History:   Procedure Laterality Date    TOOTH EXTRACTION  09/28/2022    WISDOM TOOTH EXTRACTION  2017       Social History     Socioeconomic History    Marital status:      Spouse name: Suni    Number of children: 2    Years of education: H.S.    Highest education level: High school graduate   Tobacco Use    Smoking status: Heavy Smoker     Current packs/day: 1.50     Average packs/day: 1.5 packs/day for 12.0 years (18.0 ttl pk-yrs)     Types: Cigarettes     Passive exposure: Never    Smokeless tobacco: Never   Vaping Use    Vaping status: Former   Substance and Sexual Activity    Alcohol use: No    Drug use: No    Sexual activity: Yes     Partners: Female     Birth control/protection: Tubal ligation       Family History   Problem Relation Age of Onset    Heart attack Mother     Coronary artery disease Mother     Stroke Father     Hypertension  Father     No Known Problems Sister     No Known Problems Brother     Cancer Maternal Grandmother     Heart disease Maternal Grandfather     Hypertension Paternal Grandmother     Heart attack Paternal Grandmother     Coronary artery disease Paternal Grandmother     Alcohol abuse Paternal Grandfather     No Known Problems Daughter     No Known Problems Son          Current Outpatient Medications:     albuterol sulfate  (90 Base) MCG/ACT inhaler, Inhale 2 puffs Every 4 (Four) Hours As Needed for Wheezing or Shortness of Air., Disp: 18 g, Rfl: 0    amoxicillin-clavulanate (AUGMENTIN) 875-125 MG per tablet, Take 1 tablet by mouth 2 (Two) Times a Day for 7 days., Disp: 14 tablet, Rfl: 0    lisinopril (PRINIVIL,ZESTRIL) 20 MG tablet, Take 1 tablet by mouth Daily., Disp: 90 tablet, Rfl: 3    multivitamin (Multiple Vitamin) tablet tablet, Take 1 tablet by mouth Daily., Disp: 30 tablet, Rfl: 1    pantoprazole (PROTONIX) 20 MG EC tablet, TAKE 1 TABLET BY MOUTH DAILY, Disp: 90 tablet, Rfl: 0    predniSONE (DELTASONE) 20 MG tablet, Take 1 tablet by mouth 2 (Two) Times a Day for 5 days., Disp: 10 tablet, Rfl: 0    Spacer/Aero-Holding Chambers (AeroChamber Plus Santos-Vu) misc, , Disp: , Rfl:     SUMAtriptan (IMITREX) 100 MG tablet, Take one tablet at onset of headache. May repeat dose one time in 2 hours if headache not relieved., Disp: 9 tablet, Rfl: 5    Symbicort 80-4.5 MCG/ACT inhaler, , Disp: , Rfl:     vitamin D (ERGOCALCIFEROL) 1.25 MG (68852 UT) capsule capsule, Take 1 capsule by mouth 1 (One) Time Per Week., Disp: 12 capsule, Rfl: 0      Review of Systems   Constitutional:  Negative for chills, diaphoresis, fatigue and fever.   HENT:  Positive for congestion and sinus pressure. Negative for ear pain and sore throat.    Respiratory:  Negative for cough, chest tightness, shortness of breath and wheezing.    Musculoskeletal:  Negative for neck pain.   Neurological:  Positive for headache.        There were no vitals  filed for this visit.    Objective   Physical Exam  Constitutional:       General: He is not in acute distress.     Appearance: Normal appearance. He is not ill-appearing, toxic-appearing or diaphoretic.   HENT:      Head: Normocephalic.      Nose: Congestion present.      Right Sinus: Maxillary sinus tenderness present.      Left Sinus: Maxillary sinus tenderness present.      Comments: Per pt       Mouth/Throat:      Lips: Pink.      Mouth: Mucous membranes are moist.      Dentition: Gingival swelling present.      Comments: Per pt    Pulmonary:      Effort: Pulmonary effort is normal.   Neurological:      Mental Status: He is alert and oriented to person, place, and time.          Procedures     Assessment & Plan   Diagnoses and all orders for this visit:    1. Acute non-recurrent sinusitis, unspecified location (Primary)  -     amoxicillin-clavulanate (AUGMENTIN) 875-125 MG per tablet; Take 1 tablet by mouth 2 (Two) Times a Day for 7 days.  Dispense: 14 tablet; Refill: 0  -     predniSONE (DELTASONE) 20 MG tablet; Take 1 tablet by mouth 2 (Two) Times a Day for 5 days.  Dispense: 10 tablet; Refill: 0    2. Pain in gums  -     amoxicillin-clavulanate (AUGMENTIN) 875-125 MG per tablet; Take 1 tablet by mouth 2 (Two) Times a Day for 7 days.  Dispense: 14 tablet; Refill: 0  -     predniSONE (DELTASONE) 20 MG tablet; Take 1 tablet by mouth 2 (Two) Times a Day for 5 days.  Dispense: 10 tablet; Refill: 0            PLAN: Discussed dosing, side effects, recommended other symptomatic care.  Patient should follow up with primary care provider, Urgent Care or ER if symptoms worsen, fail to resolve or other symptoms need attention. Patient/family agree to the above.         KRIS George     Mode of Visit: Video  Location of patient: -HOME-  Location of provider: +HOME+  You have chosen to receive care through a telehealth visit.  The patient has signed the video visit consent form.  The visit included audio and  video interaction. No technical issues occurred during this visit.    The use of a video visit has been reviewed with the patient and verbal informed consent has been obtained. Myself and Rahul Walters participated in this visit. The patient is located at [address] [place] [address]. I am located in [place], [place]. Mychart and Zoom were utilized.        This visit was performed via Telehealth.  This patient has been instructed to follow-up with their primary care provider if their symptoms worsen or the treatment provided does not resolve their illness.

## 2025-01-23 ENCOUNTER — TELEMEDICINE (OUTPATIENT)
Dept: FAMILY MEDICINE CLINIC | Facility: TELEHEALTH | Age: 35
End: 2025-01-23
Payer: COMMERCIAL

## 2025-01-23 DIAGNOSIS — I10 HYPERTENSION, UNSPECIFIED TYPE: Primary | ICD-10-CM

## 2025-01-24 ENCOUNTER — APPOINTMENT (OUTPATIENT)
Dept: GENERAL RADIOLOGY | Facility: HOSPITAL | Age: 35
End: 2025-01-24
Payer: COMMERCIAL

## 2025-01-24 ENCOUNTER — NURSE TRIAGE (OUTPATIENT)
Dept: CALL CENTER | Facility: HOSPITAL | Age: 35
End: 2025-01-24
Payer: COMMERCIAL

## 2025-01-24 ENCOUNTER — HOSPITAL ENCOUNTER (EMERGENCY)
Facility: HOSPITAL | Age: 35
Discharge: HOME | End: 2025-01-24
Attending: EMERGENCY MEDICINE
Payer: COMMERCIAL

## 2025-01-24 ENCOUNTER — APPOINTMENT (OUTPATIENT)
Dept: CT IMAGING | Facility: HOSPITAL | Age: 35
End: 2025-01-24
Payer: COMMERCIAL

## 2025-01-24 VITALS
RESPIRATION RATE: 16 BRPM | DIASTOLIC BLOOD PRESSURE: 85 MMHG | SYSTOLIC BLOOD PRESSURE: 129 MMHG | OXYGEN SATURATION: 94 % | HEART RATE: 74 BPM | WEIGHT: 240 LBS | BODY MASS INDEX: 33.6 KG/M2 | HEIGHT: 71 IN | TEMPERATURE: 97.9 F

## 2025-01-24 DIAGNOSIS — I10 HYPERTENSION, UNSPECIFIED TYPE: Primary | ICD-10-CM

## 2025-01-24 DIAGNOSIS — I10 PRIMARY HYPERTENSION: ICD-10-CM

## 2025-01-24 DIAGNOSIS — G43.909 MIGRAINE WITHOUT STATUS MIGRAINOSUS, NOT INTRACTABLE, UNSPECIFIED MIGRAINE TYPE: ICD-10-CM

## 2025-01-24 LAB
ALBUMIN SERPL-MCNC: 4.5 G/DL (ref 3.5–5.2)
ALBUMIN/GLOB SERPL: 1.6 G/DL
ALP SERPL-CCNC: 74 U/L (ref 39–117)
ALT SERPL W P-5'-P-CCNC: 19 U/L (ref 1–41)
ANION GAP SERPL CALCULATED.3IONS-SCNC: 9 MMOL/L (ref 5–15)
AST SERPL-CCNC: 16 U/L (ref 1–40)
BASOPHILS # BLD AUTO: 0.05 10*3/MM3 (ref 0–0.2)
BASOPHILS NFR BLD AUTO: 0.5 % (ref 0–1.5)
BILIRUB SERPL-MCNC: 0.4 MG/DL (ref 0–1.2)
BILIRUB UR QL STRIP: NEGATIVE
BUN SERPL-MCNC: 6 MG/DL (ref 6–20)
BUN/CREAT SERPL: 7.2 (ref 7–25)
CALCIUM SPEC-SCNC: 9.9 MG/DL (ref 8.6–10.5)
CHLORIDE SERPL-SCNC: 104 MMOL/L (ref 98–107)
CLARITY UR: CLEAR
CLUMPED PLATELETS: PRESENT
CO2 SERPL-SCNC: 31 MMOL/L (ref 22–29)
COLOR UR: YELLOW
CREAT SERPL-MCNC: 0.83 MG/DL (ref 0.76–1.27)
DEPRECATED RDW RBC AUTO: 41.5 FL (ref 37–54)
EGFRCR SERPLBLD CKD-EPI 2021: 117.8 ML/MIN/1.73
EOSINOPHIL # BLD AUTO: 0.14 10*3/MM3 (ref 0–0.4)
EOSINOPHIL NFR BLD AUTO: 1.3 % (ref 0.3–6.2)
ERYTHROCYTE [DISTWIDTH] IN BLOOD BY AUTOMATED COUNT: 12.8 % (ref 12.3–15.4)
GEN 5 1HR TROPONIN T REFLEX: <6 NG/L
GLOBULIN UR ELPH-MCNC: 2.9 GM/DL
GLUCOSE SERPL-MCNC: 112 MG/DL (ref 65–99)
GLUCOSE UR STRIP-MCNC: NEGATIVE MG/DL
HCT VFR BLD AUTO: 50.1 % (ref 37.5–51)
HGB BLD-MCNC: 16.9 G/DL (ref 13–17.7)
HGB UR QL STRIP.AUTO: NEGATIVE
HOLD SPECIMEN: NORMAL
IMM GRANULOCYTES # BLD AUTO: 0.03 10*3/MM3 (ref 0–0.05)
IMM GRANULOCYTES NFR BLD AUTO: 0.3 % (ref 0–0.5)
KETONES UR QL STRIP: NEGATIVE
LARGE PLATELETS: NORMAL
LEUKOCYTE ESTERASE UR QL STRIP.AUTO: NEGATIVE
LYMPHOCYTES # BLD AUTO: 2.59 10*3/MM3 (ref 0.7–3.1)
LYMPHOCYTES NFR BLD AUTO: 24.6 % (ref 19.6–45.3)
MCH RBC QN AUTO: 30 PG (ref 26.6–33)
MCHC RBC AUTO-ENTMCNC: 33.7 G/DL (ref 31.5–35.7)
MCV RBC AUTO: 89 FL (ref 79–97)
MONOCYTES # BLD AUTO: 0.84 10*3/MM3 (ref 0.1–0.9)
MONOCYTES NFR BLD AUTO: 8 % (ref 5–12)
NEUTROPHILS NFR BLD AUTO: 6.89 10*3/MM3 (ref 1.7–7)
NEUTROPHILS NFR BLD AUTO: 65.3 % (ref 42.7–76)
NITRITE UR QL STRIP: NEGATIVE
NRBC BLD AUTO-RTO: 0 /100 WBC (ref 0–0.2)
NT-PROBNP SERPL-MCNC: 98.6 PG/ML (ref 0–450)
PH UR STRIP.AUTO: 7 [PH] (ref 5–8)
PLATELET # BLD AUTO: 197 10*3/MM3 (ref 140–450)
PMV BLD AUTO: 11.5 FL (ref 6–12)
POTASSIUM SERPL-SCNC: 4.7 MMOL/L (ref 3.5–5.2)
PROT SERPL-MCNC: 7.4 G/DL (ref 6–8.5)
PROT UR QL STRIP: NEGATIVE
QT INTERVAL: 378 MS
QTC INTERVAL: 425 MS
RBC # BLD AUTO: 5.63 10*6/MM3 (ref 4.14–5.8)
RBC MORPH BLD: NORMAL
SMALL PLATELETS BLD QL SMEAR: ADEQUATE
SODIUM SERPL-SCNC: 144 MMOL/L (ref 136–145)
SP GR UR STRIP: 1.01 (ref 1–1.03)
TROPONIN T NUMERIC DELTA: NORMAL
TROPONIN T SERPL HS-MCNC: <6 NG/L
UROBILINOGEN UR QL STRIP: NORMAL
WBC MORPH BLD: NORMAL
WBC NRBC COR # BLD AUTO: 10.54 10*3/MM3 (ref 3.4–10.8)
WHOLE BLOOD HOLD COAG: NORMAL
WHOLE BLOOD HOLD SPECIMEN: NORMAL

## 2025-01-24 PROCEDURE — 80053 COMPREHEN METABOLIC PANEL: CPT | Performed by: PHYSICIAN ASSISTANT

## 2025-01-24 PROCEDURE — 96374 THER/PROPH/DIAG INJ IV PUSH: CPT

## 2025-01-24 PROCEDURE — 81003 URINALYSIS AUTO W/O SCOPE: CPT | Performed by: PHYSICIAN ASSISTANT

## 2025-01-24 PROCEDURE — 93005 ELECTROCARDIOGRAM TRACING: CPT | Performed by: PHYSICIAN ASSISTANT

## 2025-01-24 PROCEDURE — 85007 BL SMEAR W/DIFF WBC COUNT: CPT | Performed by: PHYSICIAN ASSISTANT

## 2025-01-24 PROCEDURE — 25010000002 ONDANSETRON PER 1 MG: Performed by: PHYSICIAN ASSISTANT

## 2025-01-24 PROCEDURE — 25010000002 KETOROLAC TROMETHAMINE PER 15 MG: Performed by: PHYSICIAN ASSISTANT

## 2025-01-24 PROCEDURE — 25010000002 DEXAMETHASONE PER 1 MG: Performed by: PHYSICIAN ASSISTANT

## 2025-01-24 PROCEDURE — 99284 EMERGENCY DEPT VISIT MOD MDM: CPT

## 2025-01-24 PROCEDURE — 36415 COLL VENOUS BLD VENIPUNCTURE: CPT

## 2025-01-24 PROCEDURE — 84484 ASSAY OF TROPONIN QUANT: CPT | Performed by: PHYSICIAN ASSISTANT

## 2025-01-24 PROCEDURE — 25010000002 DIPHENHYDRAMINE PER 50 MG: Performed by: PHYSICIAN ASSISTANT

## 2025-01-24 PROCEDURE — 70450 CT HEAD/BRAIN W/O DYE: CPT

## 2025-01-24 PROCEDURE — 96375 TX/PRO/DX INJ NEW DRUG ADDON: CPT

## 2025-01-24 PROCEDURE — 71045 X-RAY EXAM CHEST 1 VIEW: CPT

## 2025-01-24 PROCEDURE — 83880 ASSAY OF NATRIURETIC PEPTIDE: CPT | Performed by: PHYSICIAN ASSISTANT

## 2025-01-24 PROCEDURE — 85025 COMPLETE CBC W/AUTO DIFF WBC: CPT | Performed by: PHYSICIAN ASSISTANT

## 2025-01-24 RX ORDER — DEXAMETHASONE SODIUM PHOSPHATE 10 MG/ML
10 INJECTION INTRAMUSCULAR; INTRAVENOUS ONCE
Status: COMPLETED | OUTPATIENT
Start: 2025-01-24 | End: 2025-01-24

## 2025-01-24 RX ORDER — DIPHENHYDRAMINE HYDROCHLORIDE 50 MG/ML
25 INJECTION INTRAMUSCULAR; INTRAVENOUS ONCE
Status: COMPLETED | OUTPATIENT
Start: 2025-01-24 | End: 2025-01-24

## 2025-01-24 RX ORDER — KETOROLAC TROMETHAMINE 15 MG/ML
15 INJECTION, SOLUTION INTRAMUSCULAR; INTRAVENOUS ONCE
Status: COMPLETED | OUTPATIENT
Start: 2025-01-24 | End: 2025-01-24

## 2025-01-24 RX ORDER — LISINOPRIL 20 MG/1
40 TABLET ORAL DAILY
Qty: 90 TABLET | Refills: 0 | Status: SHIPPED | OUTPATIENT
Start: 2025-01-24 | End: 2025-01-27 | Stop reason: SDUPTHER

## 2025-01-24 RX ORDER — ONDANSETRON 2 MG/ML
4 INJECTION INTRAMUSCULAR; INTRAVENOUS ONCE
Status: COMPLETED | OUTPATIENT
Start: 2025-01-24 | End: 2025-01-24

## 2025-01-24 RX ORDER — SODIUM CHLORIDE 0.9 % (FLUSH) 0.9 %
10 SYRINGE (ML) INJECTION AS NEEDED
Status: DISCONTINUED | OUTPATIENT
Start: 2025-01-24 | End: 2025-01-24 | Stop reason: HOSPADM

## 2025-01-24 RX ADMIN — ONDANSETRON 4 MG: 2 INJECTION INTRAMUSCULAR; INTRAVENOUS at 11:54

## 2025-01-24 RX ADMIN — DEXAMETHASONE SODIUM PHOSPHATE 10 MG: 10 INJECTION INTRAMUSCULAR; INTRAVENOUS at 11:54

## 2025-01-24 RX ADMIN — KETOROLAC TROMETHAMINE 15 MG: 15 INJECTION, SOLUTION INTRAMUSCULAR; INTRAVENOUS at 11:54

## 2025-01-24 RX ADMIN — DIPHENHYDRAMINE HYDROCHLORIDE 25 MG: 50 INJECTION INTRAMUSCULAR; INTRAVENOUS at 11:54

## 2025-01-24 NOTE — ED PROVIDER NOTES
Subjective   History of Present Illness  Pt is a 33 yo male presenting to ED with complaints of elevated blood pressure. PMHx significant for HTN and GERD. Pt explains he has had a migraine since yesterday. He took his BP and it was elevated. He had a virtual visit with PCP and was instructed to take extra dose of Lisinopril 20mg. Today still had headache and BP was 153/103. He denies head injury. He tried sumatriptan PTA without relief. He denies dizziness, vision changes, confusion, weakness, numbness, neck pain, CP, SOB, cough, N/V/D, abdominal pain, urinary sx or leg swelling. He reports compliance with meds. He uses tobacco but denies ETOH use.     History provided by:  Patient and medical records      Review of Systems   Constitutional:  Negative for chills and fever.   HENT:  Negative for congestion, ear pain, sore throat and trouble swallowing.    Eyes:  Negative for pain and visual disturbance.   Respiratory:  Negative for cough and shortness of breath.    Cardiovascular:  Negative for chest pain and leg swelling.   Gastrointestinal:  Negative for abdominal pain, diarrhea, nausea and vomiting.   Genitourinary:  Negative for difficulty urinating, dysuria and flank pain.   Musculoskeletal:  Negative for arthralgias, back pain and neck pain.   Skin: Negative.  Negative for rash and wound.   Allergic/Immunologic: Negative.    Neurological:  Positive for headaches. Negative for dizziness, syncope, weakness and numbness.   Psychiatric/Behavioral:  Negative for confusion.    All other systems reviewed and are negative.      Past Medical History:   Diagnosis Date    Dental caries     GERD (gastroesophageal reflux disease)     Gingivitis     HTN (hypertension)     Hypertension     Obesity     Periodontal disease     Tobacco dependence        Allergies   Allergen Reactions    Latex Rash       Past Surgical History:   Procedure Laterality Date    TOOTH EXTRACTION  09/28/2022    WISDOM TOOTH EXTRACTION  2017        Family History   Problem Relation Age of Onset    Heart attack Mother     Coronary artery disease Mother     Stroke Father     Hypertension Father     No Known Problems Sister     No Known Problems Brother     Cancer Maternal Grandmother     Heart disease Maternal Grandfather     Hypertension Paternal Grandmother     Heart attack Paternal Grandmother     Coronary artery disease Paternal Grandmother     Alcohol abuse Paternal Grandfather     No Known Problems Daughter     No Known Problems Son        Social History     Socioeconomic History    Marital status:      Spouse name: Suni    Number of children: 2    Years of education: H.S.    Highest education level: High school graduate   Tobacco Use    Smoking status: Heavy Smoker     Current packs/day: 1.50     Average packs/day: 1.5 packs/day for 12.0 years (18.0 ttl pk-yrs)     Types: Cigarettes     Passive exposure: Never    Smokeless tobacco: Never   Vaping Use    Vaping status: Former   Substance and Sexual Activity    Alcohol use: No    Drug use: No    Sexual activity: Yes     Partners: Female     Birth control/protection: Tubal ligation           Objective   Physical Exam  Vitals and nursing note reviewed.   Constitutional:       Appearance: He is well-developed.   HENT:      Head: Atraumatic.      Nose: Nose normal.   Eyes:      General: Lids are normal.      Conjunctiva/sclera: Conjunctivae normal.      Pupils: Pupils are equal, round, and reactive to light.   Cardiovascular:      Rate and Rhythm: Normal rate and regular rhythm.      Heart sounds: Normal heart sounds.   Pulmonary:      Effort: Pulmonary effort is normal.      Breath sounds: Normal breath sounds.   Abdominal:      General: There is no distension.      Palpations: Abdomen is soft.      Tenderness: There is no abdominal tenderness. There is no guarding or rebound.   Musculoskeletal:         General: No tenderness or deformity. Normal range of motion.      Cervical back: Normal  range of motion and neck supple.   Skin:     General: Skin is warm and dry.   Neurological:      Mental Status: He is alert and oriented to person, place, and time.      Cranial Nerves: Cranial nerves 2-12 are intact.      Sensory: Sensation is intact. No sensory deficit.      Motor: Motor function is intact.      Coordination: Coordination is intact.      Gait: Gait is intact.   Psychiatric:         Behavior: Behavior normal.         Procedures           ED Course  ED Course as of 01/24/25 2044 Fri Jan 24, 2025   1108 BP(!): 171/106 [RT]   1122 I personally reviewed and interpreted labs results and went over with patient.   I personally reviewed and interpreted vitals.   [RT]   1237 BP: 137/91  BP improving [RT]   1310 EKG personally reviewed and interpreted with NSR at rate 76.  [RT]   1334 BP: 129/85 [RT]   1339 Glucose(!): 112 [RT]   1339 Potassium: 4.7 [RT]   1339 Creatinine: 0.83 [RT]   1339 HS Troponin T: <6  Normal Trop [RT]   1339 proBNP: 98.6 [RT]   1339 WBC: 10.54 [RT]   1339 Protein, UA: Negative [RT]   1340 I personally and independently reviewed CT head and CXR and agree with the radiology interpretation specifically no acute findings.  [RT]   1345 RE-examined patient and updated on results. Headache resolved. BP improved. Will dc home and have him take Lisinopril 40mg and keep log of BP to take to PCP. [RT]      ED Course User Index  [RT] Marycarmen Melendez PA      Recent Results (from the past 24 hours)   CBC Auto Differential    Collection Time: 01/24/25 11:31 AM    Specimen: Blood   Result Value Ref Range    WBC 10.54 3.40 - 10.80 10*3/mm3    RBC 5.63 4.14 - 5.80 10*6/mm3    Hemoglobin 16.9 13.0 - 17.7 g/dL    Hematocrit 50.1 37.5 - 51.0 %    MCV 89.0 79.0 - 97.0 fL    MCH 30.0 26.6 - 33.0 pg    MCHC 33.7 31.5 - 35.7 g/dL    RDW 12.8 12.3 - 15.4 %    RDW-SD 41.5 37.0 - 54.0 fl    MPV 11.5 6.0 - 12.0 fL    Platelets 197 140 - 450 10*3/mm3    Neutrophil % 65.3 42.7 - 76.0 %    Lymphocyte % 24.6 19.6  - 45.3 %    Monocyte % 8.0 5.0 - 12.0 %    Eosinophil % 1.3 0.3 - 6.2 %    Basophil % 0.5 0.0 - 1.5 %    Immature Grans % 0.3 0.0 - 0.5 %    Neutrophils, Absolute 6.89 1.70 - 7.00 10*3/mm3    Lymphocytes, Absolute 2.59 0.70 - 3.10 10*3/mm3    Monocytes, Absolute 0.84 0.10 - 0.90 10*3/mm3    Eosinophils, Absolute 0.14 0.00 - 0.40 10*3/mm3    Basophils, Absolute 0.05 0.00 - 0.20 10*3/mm3    Immature Grans, Absolute 0.03 0.00 - 0.05 10*3/mm3    nRBC 0.0 0.0 - 0.2 /100 WBC   Green Top (Gel)    Collection Time: 01/24/25 11:31 AM   Result Value Ref Range    Extra Tube Hold for add-ons.    Lavender Top    Collection Time: 01/24/25 11:31 AM   Result Value Ref Range    Extra Tube hold for add-on    Gold Top - SST    Collection Time: 01/24/25 11:31 AM   Result Value Ref Range    Extra Tube Hold for add-ons.    Gray Top    Collection Time: 01/24/25 11:31 AM   Result Value Ref Range    Extra Tube Hold for add-ons.    Light Blue Top    Collection Time: 01/24/25 11:31 AM   Result Value Ref Range    Extra Tube Hold for add-ons.    Scan Slide    Collection Time: 01/24/25 11:31 AM    Specimen: Blood   Result Value Ref Range    RBC Morphology Normal Normal    WBC Morphology Normal Normal    Platelet Estimate Adequate Normal    Clumped Platelets Present None Seen    Large Platelets Slight/1+ None Seen   ECG 12 Lead Rhythm Change    Collection Time: 01/24/25 12:02 PM   Result Value Ref Range    QT Interval 378 ms    QTC Interval 425 ms   Comprehensive Metabolic Panel    Collection Time: 01/24/25 12:12 PM    Specimen: Blood   Result Value Ref Range    Glucose 112 (H) 65 - 99 mg/dL    BUN 6 6 - 20 mg/dL    Creatinine 0.83 0.76 - 1.27 mg/dL    Sodium 144 136 - 145 mmol/L    Potassium 4.7 3.5 - 5.2 mmol/L    Chloride 104 98 - 107 mmol/L    CO2 31.0 (H) 22.0 - 29.0 mmol/L    Calcium 9.9 8.6 - 10.5 mg/dL    Total Protein 7.4 6.0 - 8.5 g/dL    Albumin 4.5 3.5 - 5.2 g/dL    ALT (SGPT) 19 1 - 41 U/L    AST (SGOT) 16 1 - 40 U/L    Alkaline  Phosphatase 74 39 - 117 U/L    Total Bilirubin 0.4 0.0 - 1.2 mg/dL    Globulin 2.9 gm/dL    A/G Ratio 1.6 g/dL    BUN/Creatinine Ratio 7.2 7.0 - 25.0    Anion Gap 9.0 5.0 - 15.0 mmol/L    eGFR 117.8 >60.0 mL/min/1.73   BNP    Collection Time: 01/24/25 12:12 PM    Specimen: Blood   Result Value Ref Range    proBNP 98.6 0.0 - 450.0 pg/mL   High Sensitivity Troponin T    Collection Time: 01/24/25 12:12 PM    Specimen: Blood   Result Value Ref Range    HS Troponin T <6 <22 ng/L   Urinalysis With Microscopic If Indicated (No Culture) - Urine, Clean Catch    Collection Time: 01/24/25 12:40 PM    Specimen: Urine, Clean Catch   Result Value Ref Range    Color, UA Yellow Yellow, Straw    Appearance, UA Clear Clear    pH, UA 7.0 5.0 - 8.0    Specific Gravity, UA 1.006 1.001 - 1.030    Glucose, UA Negative Negative    Ketones, UA Negative Negative    Bilirubin, UA Negative Negative    Blood, UA Negative Negative    Protein, UA Negative Negative    Leuk Esterase, UA Negative Negative    Nitrite, UA Negative Negative    Urobilinogen, UA 0.2 E.U./dL 0.2 - 1.0 E.U./dL   High Sensitivity Troponin T 1Hr    Collection Time: 01/24/25  1:23 PM    Specimen: Blood   Result Value Ref Range    HS Troponin T <6 <22 ng/L    Troponin T Numeric Delta       Note: In addition to lab results from this visit, the labs listed above may include labs taken at another facility or during a different encounter within the last 24 hours. Please correlate lab times with ED admission and discharge times for further clarification of the services performed during this visit.    CT Head Without Contrast   Final Result   Impression:   No acute intracranial abnormality identified.            Electronically Signed: Ronny Bello MD     1/24/2025 12:49 PM EST     Workstation ID: [id number]      XR Chest 1 View   Final Result   Impression:   No acute cardiopulmonary findings.          Electronically Signed: Joe Carrion MD     1/24/2025 11:41 AM EST      Workstation ID: [id number]        Vitals:    01/24/25 1230 01/24/25 1232 01/24/25 1302 01/24/25 1332   BP: 137/91 137/91 137/89 129/85   BP Location:       Patient Position:       Pulse: 70 71 70 74   Resp:       Temp:       TempSrc:       SpO2: 98% 94% 94% 94%   Weight:       Height:         Medications   ketorolac (TORADOL) injection 15 mg (15 mg Intravenous Given 1/24/25 1154)   dexAMETHasone (DECADRON) injection 10 mg (10 mg Intravenous Given 1/24/25 1154)   ondansetron (ZOFRAN) injection 4 mg (4 mg Intravenous Given 1/24/25 1154)   diphenhydrAMINE (BENADRYL) injection 25 mg (25 mg Intravenous Given 1/24/25 1154)     ECG/EMG Results (last 24 hours)       Procedure Component Value Units Date/Time    ECG 12 Lead Rhythm Change [[id number]] Collected: 01/24/25 1202     Updated: 01/24/25 1308     QT Interval 378 ms      QTC Interval 425 ms     Narrative:      Test Reason : Rhythm Change  Blood Pressure :   */*   mmHG  Vent. Rate :  76 BPM     Atrial Rate :  76 BPM     P-R Int : 162 ms          QRS Dur :  90 ms      QT Int : 378 ms       P-R-T Axes :  13   4  19 degrees    QTcB Int : 425 ms    Normal sinus rhythm  Normal ECG  When compared with ECG of 30-Apr-2022 19:41,  No significant change was found  Confirmed by CHERRY BANUELOS MD ([id number]) on 1/24/2025 1:07:56 PM    Referred By: EDMD           Confirmed By: CHERRY BANUELOS MD          ECG 12 Lead Rhythm Change   Final Result   Test Reason : Rhythm Change   Blood Pressure :   */*   mmHG   Vent. Rate :  76 BPM     Atrial Rate :  76 BPM      P-R Int : 162 ms          QRS Dur :  90 ms       QT Int : 378 ms       P-R-T Axes :  13   4  19 degrees     QTcB Int : 425 ms      Normal sinus rhythm   Normal ECG   When compared with ECG of 30-Apr-2022 19:41,   No significant change was found   Confirmed by CHERRY BANUELOS MD ([id number]) on 1/24/2025 1:07:56 PM      Referred By: EDMD           Confirmed By: CHERRY BANUELOS MD          DISCHARGE    Patient discharged in stable  condition.    Reviewed implications of results, diagnosis, meds, responsibility to follow up, warning signs and symptoms of possible worsening, potential complications and reasons to return to ER.    Patient/Family voiced understanding of above instructions.    Discussed plan for discharge, as there is no emergent indication for admission.  Pt/family is agreeable and understands need for follow up and possible repeat testing.  Pt/family is aware that discharge does not mean that nothing is wrong but that it indicates no emergency is currently present that requires admission and they must continue care with follow-up as given below or with a physician of their choice.     FOLLOW-UP  Boom Benoit MD  [address] [facility]  [address]  [facility] [address]  [phone]    Schedule an appointment as soon as possible for a visit       [facility] EMERGENCY DEPARTMENT  [address] [facility] [facility] [address] [phone]    If symptoms worsen         Medication List        Changed      lisinopril 20 MG tablet  Commonly known as: PRINIVIL,ZESTRIL  Take 2 tablets by mouth Daily.  What changed: how much to take               Where to Get Your Medications        These medications were sent to [facility] [id number] - [place], [place] - [address]  - [phone]  - [phone] FX  [address] , [facility] [address]      Phone: [phone]   lisinopril 20 MG tablet                                                        Medical Decision Making  Pt is a 35 yo male presenting to ED with complaints of elevated BP and headache. I had a discussion with the patient / family regarding ED course, diagnosis, diagnostic results and treatment plan including medications and admission / discharge. Discussed if new or worse symptoms / concerns to return to ED for further evaluation. Discussed need for close follow up with PCP / specialists     DDx  CVA, Migraine, Brain mass, ACS, NSTEMI, CHF,  ESTEVAN, Arrhythmia, Stress, Anxiety     Problems Addressed:  Hypertension, unspecified type: complicated acute illness or injury  Migraine without status migrainosus, not intractable, unspecified migraine type: complicated acute illness or injury    Amount and/or Complexity of Data Reviewed  External Data Reviewed: notes.     Details: Reviewed previous non ED visits including prior labs, imaging, available notes, medications, allergies and surgical hx.   PCP 1-23-25 HTN  Labs: ordered. Decision-making details documented in ED Course.  Radiology: ordered. Decision-making details documented in ED Course.  ECG/medicine tests: ordered. Decision-making details documented in ED Course.    Risk  Prescription drug management.        Final diagnoses:   Hypertension, unspecified type   Migraine without status migrainosus, not intractable, unspecified migraine type       ED Disposition  ED Disposition       ED Disposition   Discharge    Condition   Stable    Comment   --               Boom Benoit MD  [address] [place] [address]  [phone]    Schedule an appointment as soon as possible for a visit       [facility] EMERGENCY DEPARTMENT  [address] [facility] [address] [phone]    If symptoms worsen         Medication List        Changed      lisinopril 20 MG tablet  Commonly known as: PRINIVIL,ZESTRIL  Take 2 tablets by mouth Daily.  What changed: how much to take               Where to Get Your Medications        These medications were sent to [facility] [id number] - [place], [place] - [address] - [phone]  - [phone] FX  [address], [facility] [address]      Phone: [phone]   lisinopril 20 MG tablet            Marycarmen Melendez PA  01/24/25 2996

## 2025-01-24 NOTE — TELEPHONE ENCOUNTER
HUB CALL    Caller advises that his BP was elevated all day yesterday with HA, takes Lisinopril and was advised during virtual visit to take additional dose last PM, Caller advises still having HA this AM that he rates at 4 on pain scale. Advises current BP of 150/96. Wishing to schedule with PCP for follow up visit. Connected with [facility] for scheduling.         Reason for Disposition   [1] Taking BP medications AND [2] feels is having side effects (e.g., impotence, cough, dizzy upon standing)    Additional Information   Negative: Difficult to awaken or acting confused (e.g., disoriented, slurred speech)   Negative: SEVERE difficulty breathing (e.g., struggling for each breath, speaks in single words)   Negative: [1] Weakness of the face, arm or leg on one side of the body AND [2] new-onset   Negative: [1] Numbness (i.e., loss of sensation) of the face, arm or leg on one side of the body AND [2] new-onset   Negative: [1] Chest pain lasts > 5 minutes AND [2] history of heart disease (i.e., heart attack, bypass surgery, angina, angioplasty, CHF)   Negative: [1] Chest pain AND [2] took nitrogylcerin AND [3] pain was not relieved   Negative: Sounds like a life-threatening emergency to the triager   Negative: Symptom is main concern (e.g., headache, chest pain)   Negative: Low blood pressure is main concern   Negative: [1] Systolic BP  >= 160 OR Diastolic >= 100 AND [2] cardiac (e.g., breathing difficulty, chest pain) or neurologic symptoms (e.g., new-onset blurred or double vision, unsteady gait)   Negative: [1] Pregnant 20 or more weeks (or postpartum < 6 weeks) AND [2] new hand or face swelling   Negative: [1] Pregnant 20 or more weeks (or postpartum < 6 weeks) AND [2] Systolic BP >= 160 OR Diastolic >= 110   Negative: [1] Systolic BP  >= 200 OR Diastolic >= 120 AND [2] having NO cardiac or neurologic symptoms   Negative: [1] Pregnant 20 or more weeks (or postpartum < 6 weeks) AND [2] Systolic BP  >= 140  "OR Diastolic >= 90   Negative: [1] Systolic BP  >= 180 OR Diastolic >= 110 AND [2] missed most recent dose of blood pressure medication   Negative: Systolic BP  >= 180 OR Diastolic >= 110   Negative: Ran out of BP medications   Negative: Systolic BP  >= 160 OR Diastolic >= 100    Answer Assessment - Initial Assessment Questions  1. BLOOD PRESSURE: \"What is the blood pressure?\" \"Did you take at least two measurements 5 minutes apart?\"      150/96  2. ONSET: \"When did you take your blood pressure?\"      Just prior to call  3. HOW: \"How did you take your blood pressure?\" (e.g., automatic home BP monitor, visiting nurse)      Automatic cuff  4. HISTORY: \"Do you have a history of high blood pressure?\"      Yes  5. MEDICINES: \"Are you taking any medicines for blood pressure?\" \"Have you missed any doses recently?\"      Lisinopril/no   6. OTHER SYMPTOMS: \"Do you have any symptoms?\" (e.g., blurred vision, chest pain, difficulty breathing, headache, weakness)      HA  7. PREGNANCY: \"Is there any chance you are pregnant?\" \"When was your last menstrual period?\"      Na    Protocols used: Blood Pressure - High-ADULT-AH    "

## 2025-01-24 NOTE — PATIENT INSTRUCTIONS
May take an extra dose of lisinopril tonight and call pcp in the morning for an appointment. If symptoms change or worsen tonight go to the ER. Especially for any chest pain, shortness of breath, severe headache, dizziness, fainting, or if blood pressure goes above 180/120.

## 2025-01-24 NOTE — Clinical Note
[facility] EMERGENCY DEPARTMENT  [address]  Formerly [facility] [address]  Phone: [phone]    Rahul Waltesr was seen and treated in our emergency department on 1/24/2025.  He may return to work on 01/27/2025.         Thank you for choosing [facility].    Marycarmen Melendez, PA

## 2025-01-24 NOTE — PROGRESS NOTES
Subjective   Rahul Walters is a 34 y.o. male.     History of Present Illness  He presents with c/o increased blood pressure today and a headache. He noticed a headache yesterday but has migraines and therefore wasn't concerned. Today he checked his blood pressure and the reading was 160/90 and has remained at that level throughout the day. He does take lisinopril 20 mg daily and did not miss any recent doses.       The following portions of the patient's history were reviewed and updated as appropriate: allergies, current medications, past family history, past medical history, past social history, past surgical history, and problem list.    Review of Systems   Constitutional:  Negative for fever.   HENT: Negative.     Eyes:  Negative for blurred vision, double vision and photophobia.   Respiratory:  Negative for cough, chest tightness and shortness of breath.    Gastrointestinal: Negative.    Neurological:  Positive for headache (generalized).       Objective   Physical Exam  Constitutional:       General: He is not in acute distress.     Appearance: He is well-developed. He is not diaphoretic.   Pulmonary:      Effort: Pulmonary effort is normal.   Neurological:      Mental Status: He is alert and oriented to person, place, and time.   Psychiatric:         Behavior: Behavior normal.           Assessment & Plan   Diagnoses and all orders for this visit:    1. Hypertension, unspecified type (Primary)          May take an extra dose of lisinopril tonight and call pcp in the morning for an appointment. If symptoms change or worsen tonight go to the ER. Especially for any chest pain, shortness of breath, severe headache, dizziness, fainting, or if blood pressure goes above 180/120.       The use of a video visit has been reviewed with the patient and verbal informed consent has been obtained. Myself and Rahul Walters participated in this visit. The patient is located in  [place], [place] . I am  located in [place], [place]. [place] and [employer] were utilized. I spent 20 minutes in the patient's chart for this visit.

## 2025-01-27 ENCOUNTER — OFFICE VISIT (OUTPATIENT)
Dept: FAMILY MEDICINE CLINIC | Facility: CLINIC | Age: 35
End: 2025-01-27
Payer: COMMERCIAL

## 2025-01-27 VITALS
HEART RATE: 63 BPM | OXYGEN SATURATION: 98 % | BODY MASS INDEX: 34.52 KG/M2 | DIASTOLIC BLOOD PRESSURE: 92 MMHG | SYSTOLIC BLOOD PRESSURE: 154 MMHG | HEIGHT: 71 IN | TEMPERATURE: 98.6 F | WEIGHT: 246.6 LBS

## 2025-01-27 DIAGNOSIS — I10 PRIMARY HYPERTENSION: Primary | ICD-10-CM

## 2025-01-27 DIAGNOSIS — G43.C0 PERIODIC HEADACHE SYNDROME, NOT INTRACTABLE: ICD-10-CM

## 2025-01-27 PROCEDURE — 99214 OFFICE O/P EST MOD 30 MIN: CPT | Performed by: FAMILY MEDICINE

## 2025-01-27 PROCEDURE — 1160F RVW MEDS BY RX/DR IN RCRD: CPT | Performed by: FAMILY MEDICINE

## 2025-01-27 PROCEDURE — 1126F AMNT PAIN NOTED NONE PRSNT: CPT | Performed by: FAMILY MEDICINE

## 2025-01-27 PROCEDURE — 3077F SYST BP >= 140 MM HG: CPT | Performed by: FAMILY MEDICINE

## 2025-01-27 PROCEDURE — 1159F MED LIST DOCD IN RCRD: CPT | Performed by: FAMILY MEDICINE

## 2025-01-27 PROCEDURE — 3080F DIAST BP >= 90 MM HG: CPT | Performed by: FAMILY MEDICINE

## 2025-01-27 RX ORDER — LISINOPRIL 20 MG/1
20 TABLET ORAL 2 TIMES DAILY
Qty: 180 TABLET | Refills: 1 | Status: SHIPPED | OUTPATIENT
Start: 2025-01-27

## 2025-01-27 RX ORDER — RIZATRIPTAN BENZOATE 10 MG/1
10 TABLET ORAL ONCE AS NEEDED
Qty: 9 TABLET | Refills: 5 | Status: SHIPPED | OUTPATIENT
Start: 2025-01-27

## 2025-01-27 NOTE — ASSESSMENT & PLAN NOTE
Hypertension is uncontrolled  Medication changes per orders.  Blood pressure will be reassessed 2 months .

## 2025-01-27 NOTE — ASSESSMENT & PLAN NOTE
Headaches are  poorly controlled .    Plan:  Discontinue the following medication/s; imitrex.   and Begin taking the following medication/s; maxalt.     Discussed medication dosage, use, side effects, and goals of treatment in detail.    Discussed monitoring symptoms and use of quick-relief medications and maintenance medication.    General Treatment Goals:   symptom prevention  minimize work absence  minimizing limitation in activity  prevention of exacerbations  decrease use of ER/inpatient care  minimization of adverse effects of treatment    Followup  2 months

## 2025-01-27 NOTE — PATIENT INSTRUCTIONS
"Hypertension, Adult  High blood pressure (hypertension) is when the force of blood pumping through the arteries is too strong. The arteries are the blood vessels that carry blood from the heart throughout the body. Hypertension forces the heart to work harder to pump blood and may cause arteries to become narrow or stiff. Untreated or uncontrolled hypertension can lead to a heart attack, heart failure, a stroke, kidney disease, and other problems.  A blood pressure reading consists of a higher number over a lower number. Ideally, your blood pressure should be below 120/80. The first (\"top\") number is called the systolic pressure. It is a measure of the pressure in your arteries as your heart beats. The second (\"bottom\") number is called the diastolic pressure. It is a measure of the pressure in your arteries as the heart relaxes.  What are the causes?  The exact cause of this condition is not known. There are some conditions that result in high blood pressure.  What increases the risk?  Certain factors may make you more likely to develop high blood pressure. Some of these risk factors are under your control, including:  Smoking.  Not getting enough exercise or physical activity.  Being overweight.  Having too much fat, sugar, calories, or salt (sodium) in your diet.  Drinking too much alcohol.  Other risk factors include:  Having a personal history of heart disease, diabetes, high cholesterol, or kidney disease.  Stress.  Having a family history of high blood pressure and high cholesterol.  Having obstructive sleep apnea.  Age. The risk increases with age.  What are the signs or symptoms?  High blood pressure may not cause symptoms. Very high blood pressure (hypertensive crisis) may cause:  Headache.  Fast or irregular heartbeats (palpitations).  Shortness of breath.  Nosebleed.  Nausea and vomiting.  Vision changes.  Severe chest pain, dizziness, and seizures.  How is this diagnosed?  This condition is diagnosed by " measuring your blood pressure while you are seated, with your arm resting on a flat surface, your legs uncrossed, and your feet flat on the floor. The cuff of the blood pressure monitor will be placed directly against the skin of your upper arm at the level of your heart. Blood pressure should be measured at least twice using the same arm. Certain conditions can cause a difference in blood pressure between your right and left arms.  If you have a high blood pressure reading during one visit or you have normal blood pressure with other risk factors, you may be asked to:  Return on a different day to have your blood pressure checked again.  Monitor your blood pressure at home for 1 week or longer.  If you are diagnosed with hypertension, you may have other blood or imaging tests to help your health care provider understand your overall risk for other conditions.  How is this treated?  This condition is treated by making healthy lifestyle changes, such as eating healthy foods, exercising more, and reducing your alcohol intake. You may be referred for counseling on a healthy diet and physical activity.  Your health care provider may prescribe medicine if lifestyle changes are not enough to get your blood pressure under control and if:  Your systolic blood pressure is above 130.  Your diastolic blood pressure is above 80.  Your personal target blood pressure may vary depending on your medical conditions, your age, and other factors.  Follow these instructions at home:  Eating and drinking    Eat a diet that is high in fiber and potassium, and low in sodium, added sugar, and fat. An example of this eating plan is called the DASH diet. DASH stands for Dietary Approaches to Stop Hypertension. To eat this way:  Eat plenty of fresh fruits and vegetables. Try to fill one half of your plate at each meal with fruits and vegetables.  Eat whole grains, such as whole-wheat pasta, brown rice, or whole-grain bread. Fill about one  fourth of your plate with whole grains.  Eat or drink low-fat dairy products, such as skim milk or low-fat yogurt.  Avoid fatty cuts of meat, processed or cured meats, and poultry with skin. Fill about one fourth of your plate with lean proteins, such as fish, chicken without skin, beans, eggs, or tofu.  Avoid pre-made and processed foods. These tend to be higher in sodium, added sugar, and fat.  Reduce your daily sodium intake. Many people with hypertension should eat less than 1,500 mg of sodium a day.  Do not drink alcohol if:  Your health care provider tells you not to drink.  You are pregnant, may be pregnant, or are planning to become pregnant.  If you drink alcohol:  Limit how much you have to:  0-1 drink a day for women.  0-2 drinks a day for men.  Know how much alcohol is in your drink. In the [place] one drink equals one 12 oz bottle of beer (355 mL), one 5 oz glass of wine (148 mL), or one 1½ oz glass of hard liquor (44 mL).  Lifestyle    Work with your health care provider to maintain a healthy body weight or to lose weight. Ask what an ideal weight is for you.  Get at least 30 minutes of exercise that causes your heart to beat faster (aerobic exercise) most days of the week. Activities may include walking, swimming, or biking.  Include exercise to strengthen your muscles (resistance exercise), such as Pilates or lifting weights, as part of your weekly exercise routine. Try to do these types of exercises for 30 minutes at least 3 days a week.  Do not use any products that contain nicotine or tobacco. These products include cigarettes, chewing tobacco, and vaping devices, such as e-cigarettes. If you need help quitting, ask your health care provider.  Monitor your blood pressure at home as told by your health care provider.  Keep all follow-up visits. This is important.  Medicines  Take over-the-counter and prescription medicines only as told by your health care provider. Follow directions carefully. Blood  pressure medicines must be taken as prescribed.  Do not skip doses of blood pressure medicine. Doing this puts you at risk for problems and can make the medicine less effective.  Ask your health care provider about side effects or reactions to medicines that you should watch for.  Contact a health care provider if you:  Think you are having a reaction to a medicine you are taking.  Have headaches that keep coming back (recurring).  Feel dizzy.  Have swelling in your ankles.  Have trouble with your vision.  Get help right away if you:  Develop a severe headache or confusion.  Have unusual weakness or numbness.  Feel faint.  Have severe pain in your chest or abdomen.  Vomit repeatedly.  Have trouble breathing.  These symptoms may be an emergency. Get help right away. Call 911.  Do not wait to see if the symptoms will go away.  Do not drive yourself to the hospital.  Summary  Hypertension is when the force of blood pumping through your arteries is too strong. If this condition is not controlled, it may put you at risk for serious complications.  Your personal target blood pressure may vary depending on your medical conditions, your age, and other factors. For most people, a normal blood pressure is less than 120/80.  Hypertension is treated with lifestyle changes, medicines, or a combination of both. Lifestyle changes include losing weight, eating a healthy, low-sodium diet, exercising more, and limiting alcohol.  This information is not intended to replace advice given to you by your health care provider. Make sure you discuss any questions you have with your health care provider.  Document Revised: 10/25/2022 Document Reviewed: 10/25/2022  Elsevier Patient Education © 2024 Elsevier Inc.Migraine Headache  A migraine headache is an intense pulsing or throbbing pain on one or both sides of the head. Migraine headaches may also cause other symptoms, such as nausea, vomiting, and sensitivity to light and noise. A migraine  headache can last from 4 hours to 3 days. Talk with your health care provider about what things may bring on (trigger) your migraine headaches.  What are the causes?  The exact cause is not known. However, a migraine may be caused when nerves in the brain get irritated and release chemicals that cause blood vessels to become inflamed. This inflammation causes pain. Migraines may be triggered or caused by:  Smoking.  Medicines, such as:  Nitroglycerin, which is used to treat chest pain.  Birth control pills.  Estrogen.  Certain blood pressure medicines.  Foods or drinks that contain nitrates, glutamate, aspartame, MSG, or tyramine.  Certain foods or drinks, such as aged cheeses, chocolate, alcohol, or caffeine.  Doing physical activity that is very hard.  Other triggers may include:  Menstruation.  Pregnancy.  Hunger.  Stress.  Getting too much or too little sleep.  Weather changes.  Tiredness (fatigue).  What increases the risk?  The following factors may make you more likely to have migraine headaches:  Being between the ages of 25-55 years old.  Being female.  Having a family history of migraine headaches.  Being .  Having a mental health condition, such as depression or anxiety.  Being obese.  What are the signs or symptoms?  The main symptom of this condition is pulsing or throbbing pain. This pain may:  Happen in any area of the head, such as on one or both sides.  Make it hard to do daily activities.  Get worse with physical activity.  Get worse around bright lights, loud noises, or smells.  Other symptoms may include:  Nausea.  Vomiting.  Dizziness.  Before a migraine headache starts, you may get warning signs (an aura). An aura may include:  Seeing flashing lights or having blind spots.  Seeing bright spots, halos, or zigzag lines.  Having tunnel vision or blurred vision.  Having numbness or a tingling feeling.  Having trouble talking.  Having muscle weakness.  After a migraine ends, you may have  symptoms. These may include:  Feeling tired.  Trouble concentrating.  How is this diagnosed?  A migraine headache can be diagnosed based on:  Your symptoms.  A physical exam.  Tests, such as:  A CT scan or an MRI of the head. These tests can help rule out other causes of headaches.  Taking fluid from the spine (lumbar puncture) to examine it (cerebrospinal fluid analysis, or CSF analysis).  How is this treated?  This condition may be treated with medicines that:  Relieve pain and nausea.  Prevent migraines.  Treatment may also include:  Acupuncture.  Lifestyle changes like avoiding foods that trigger migraine headaches.  Learning ways to control your body (biofeedback).  Talk therapy to help you know and deal with negative thoughts (cognitive behavioral therapy).  Follow these instructions at home:  Medicines  Take over-the-counter and prescription medicines only as told by your provider.  Ask your provider if the medicine prescribed to you:  Requires you to avoid driving or using machinery.  Can cause constipation. You may need to take these actions to prevent or treat constipation:  Drink enough fluid to keep your pee (urine) pale yellow.  Take over-the-counter or prescription medicines.  Eat foods that are high in fiber, such as beans, whole grains, and fresh fruits and vegetables.  Limit foods that are high in fat and processed sugars, such as fried or sweet foods.  Lifestyle    Do not drink alcohol.  Do not use any products that contain nicotine or tobacco. These products include cigarettes, chewing tobacco, and vaping devices, such as e-cigarettes. If you need help quitting, ask your provider.  Get 7-9 hours of sleep each night, or the amount recommended by your provider.  Find ways to manage stress, such as meditation, deep breathing, or yoga.  Try to exercise regularly. This can help lessen how bad and how often your migraines occur.  General instructions  Keep a journal to find out what triggers your  migraines, so you can avoid those things. For example, write down:  What you eat and drink.  How much sleep you get.  Any change to your diet or medicines.  If you have a migraine headache:  Avoid things that make your symptoms worse, such as bright lights.  Lie down in a dark, quiet room.  Do not drive or use machinery.  Ask your provider what activities are safe for you while you have symptoms.  Keep all follow-up visits. Your provider will monitor your symptoms and recommend any further treatment.  Where to find more information  Coalition for Headache and Migraine Patients (CHAMP): headachemigraine.org  American Migraine Foundation: americanmigrainefoundation.org  National Headache Foundation: headaches.org  Contact a health care provider if:  You have symptoms that are different or worse than your usual migraine headache symptoms.  You have more than 15 days of headaches in one month.  Get help right away if:  Your migraine headache becomes severe or lasts more than 72 hours.  You have a fever or stiff neck.  You have vision loss.  Your muscles feel weak or like you cannot control them.  You lose your balance often or have trouble walking.  You faint.  You have a seizure.  This information is not intended to replace advice given to you by your health care provider. Make sure you discuss any questions you have with your health care provider.  Document Revised: 08/14/2023 Document Reviewed: 08/14/2023  Elsevier Patient Education © 2024 Elsevier Inc.

## 2025-01-27 NOTE — PROGRESS NOTES
Rahul Walters is a 34 y.o. male who presents today for Hypertension      Patient has a migraine on Friday and was seen in the ED. They got rid of the headache and told him to double his lisinopril. He did this through the weekend and check his blood pressure seeing 130-140s/80s at home. He has not had further headache and can not normally feel when his blood pressure is high but he sometimes feels hot and gets a headache. He tolerated increased dose of lisinopril well. He stopped taking increased dose of the lisinopril. He tried to take the sumatriptan before going to the ED but it did not help. He does not find the sumatriptan to be helpful for headaches. He has not tried other medication for migraine in the past. He has no other complaints today.          Review of Systems   Constitutional:  Negative for fever and unexpected weight loss.   HENT:  Negative for congestion, ear pain and sore throat.    Eyes:  Negative for visual disturbance.   Respiratory:  Negative for cough, shortness of breath and wheezing.    Cardiovascular:  Negative for chest pain and palpitations.   Gastrointestinal:  Negative for abdominal pain, blood in stool, constipation, diarrhea, nausea, vomiting and GERD.   Endocrine: Negative for polydipsia and polyuria.   Genitourinary:  Negative for difficulty urinating.   Musculoskeletal:  Negative for joint swelling.   Skin:  Negative for rash and skin lesions.   Allergic/Immunologic: Negative for environmental allergies.   Neurological:  Positive for headache. Negative for seizures and syncope.   Hematological:  Does not bruise/bleed easily.   Psychiatric/Behavioral:  Negative for suicidal ideas.         The following portions of the patient's history were reviewed and updated as appropriate: allergies, current medications, past family history, past medical history, past social history, past surgical history and problem list.    Current Outpatient Medications on File Prior to Visit  "  Medication Sig Dispense Refill    albuterol sulfate  (90 Base) MCG/ACT inhaler Inhale 2 puffs Every 4 (Four) Hours As Needed for Wheezing or Shortness of Air. 18 g 0    multivitamin (Multiple Vitamin) tablet tablet Take 1 tablet by mouth Daily. 30 tablet 1    pantoprazole (PROTONIX) 20 MG EC tablet TAKE 1 TABLET BY MOUTH DAILY 90 tablet 0    Spacer/Aero-Holding Chambers (AeroChamber Plus Santos-Vu) misc       Symbicort 80-4.5 MCG/ACT inhaler       vitamin D (ERGOCALCIFEROL) 1.25 MG (05324 UT) capsule capsule Take 1 capsule by mouth 1 (One) Time Per Week. 12 capsule 0    [DISCONTINUED] lisinopril (PRINIVIL,ZESTRIL) 20 MG tablet Take 2 tablets by mouth Daily. 90 tablet 0    [DISCONTINUED] SUMAtriptan (IMITREX) 100 MG tablet Take one tablet at onset of headache. May repeat dose one time in 2 hours if headache not relieved. 9 tablet 5     No current facility-administered medications on file prior to visit.       Allergies   Allergen Reactions    Latex Rash        Visit Vitals  /92   Pulse 63   Temp 98.6 °F (37 °C) (Infrared)   Ht 180.3 cm (71\")   Wt 112 kg (246 lb 9.6 oz)   SpO2 98%   BMI 34.39 kg/m²        Physical Exam  Constitutional:       General: He is not in acute distress.     Appearance: He is well-developed. He is not diaphoretic.   HENT:      Head: Atraumatic.   Cardiovascular:      Rate and Rhythm: Normal rate and regular rhythm.      Heart sounds: Normal heart sounds. No murmur heard.     No friction rub. No gallop.   Pulmonary:      Effort: Pulmonary effort is normal. No respiratory distress.      Breath sounds: Normal breath sounds. No stridor. No wheezing, rhonchi or rales.   Abdominal:      General: Bowel sounds are normal. There is no distension.      Palpations: Abdomen is soft. There is no mass.      Tenderness: There is no abdominal tenderness. There is no guarding or rebound.      Hernia: No hernia is present.   Musculoskeletal:      Cervical back: Normal range of motion and neck " supple.   Skin:     General: Skin is warm and dry.   Neurological:      Mental Status: He is alert and oriented to person, place, and time.   Psychiatric:         Behavior: Behavior normal.          Results for orders placed or performed during the hospital encounter of 01/24/25   CBC Auto Differential    Collection Time: 01/24/25 11:31 AM    Specimen: Blood   Result Value Ref Range    WBC 10.54 3.40 - 10.80 10*3/mm3    RBC 5.63 4.14 - 5.80 10*6/mm3    Hemoglobin 16.9 13.0 - 17.7 g/dL    Hematocrit 50.1 37.5 - 51.0 %    MCV 89.0 79.0 - 97.0 fL    MCH 30.0 26.6 - 33.0 pg    MCHC 33.7 31.5 - 35.7 g/dL    RDW 12.8 12.3 - 15.4 %    RDW-SD 41.5 37.0 - 54.0 fl    MPV 11.5 6.0 - 12.0 fL    Platelets 197 140 - 450 10*3/mm3    Neutrophil % 65.3 42.7 - 76.0 %    Lymphocyte % 24.6 19.6 - 45.3 %    Monocyte % 8.0 5.0 - 12.0 %    Eosinophil % 1.3 0.3 - 6.2 %    Basophil % 0.5 0.0 - 1.5 %    Immature Grans % 0.3 0.0 - 0.5 %    Neutrophils, Absolute 6.89 1.70 - 7.00 10*3/mm3    Lymphocytes, Absolute 2.59 0.70 - 3.10 10*3/mm3    Monocytes, Absolute 0.84 0.10 - 0.90 10*3/mm3    Eosinophils, Absolute 0.14 0.00 - 0.40 10*3/mm3    Basophils, Absolute 0.05 0.00 - 0.20 10*3/mm3    Immature Grans, Absolute 0.03 0.00 - 0.05 10*3/mm3    nRBC 0.0 0.0 - 0.2 /100 WBC   Scan Slide    Collection Time: 01/24/25 11:31 AM    Specimen: Blood   Result Value Ref Range    RBC Morphology Normal Normal    WBC Morphology Normal Normal    Platelet Estimate Adequate Normal    Clumped Platelets Present None Seen    Large Platelets Slight/1+ None Seen   Green Top (Gel)    Collection Time: 01/24/25 11:31 AM   Result Value Ref Range    Extra Tube Hold for add-ons.    Lavender Top    Collection Time: 01/24/25 11:31 AM   Result Value Ref Range    Extra Tube hold for add-on    Gold Top - SST    Collection Time: 01/24/25 11:31 AM   Result Value Ref Range    Extra Tube Hold for add-ons.    Gray Top    Collection Time: 01/24/25 11:31 AM   Result Value Ref Range     Extra Tube Hold for add-ons.    Light Blue Top    Collection Time: 01/24/25 11:31 AM   Result Value Ref Range    Extra Tube Hold for add-ons.    ECG 12 Lead Rhythm Change    Collection Time: 01/24/25 12:02 PM   Result Value Ref Range    QT Interval 378 ms    QTC Interval 425 ms   Comprehensive Metabolic Panel    Collection Time: 01/24/25 12:12 PM    Specimen: Blood   Result Value Ref Range    Glucose 112 (H) 65 - 99 mg/dL    BUN 6 6 - 20 mg/dL    Creatinine 0.83 0.76 - 1.27 mg/dL    Sodium 144 136 - 145 mmol/L    Potassium 4.7 3.5 - 5.2 mmol/L    Chloride 104 98 - 107 mmol/L    CO2 31.0 (H) 22.0 - 29.0 mmol/L    Calcium 9.9 8.6 - 10.5 mg/dL    Total Protein 7.4 6.0 - 8.5 g/dL    Albumin 4.5 3.5 - 5.2 g/dL    ALT (SGPT) 19 1 - 41 U/L    AST (SGOT) 16 1 - 40 U/L    Alkaline Phosphatase 74 39 - 117 U/L    Total Bilirubin 0.4 0.0 - 1.2 mg/dL    Globulin 2.9 gm/dL    A/G Ratio 1.6 g/dL    BUN/Creatinine Ratio 7.2 7.0 - 25.0    Anion Gap 9.0 5.0 - 15.0 mmol/L    eGFR 117.8 >60.0 mL/min/1.73   BNP    Collection Time: 01/24/25 12:12 PM    Specimen: Blood   Result Value Ref Range    proBNP 98.6 0.0 - 450.0 pg/mL   High Sensitivity Troponin T    Collection Time: 01/24/25 12:12 PM    Specimen: Blood   Result Value Ref Range    HS Troponin T <6 <22 ng/L   Urinalysis With Microscopic If Indicated (No Culture) - Urine, Clean Catch    Collection Time: 01/24/25 12:40 PM    Specimen: Urine, Clean Catch   Result Value Ref Range    Color, UA Yellow Yellow, Straw    Appearance, UA Clear Clear    pH, UA 7.0 5.0 - 8.0    Specific Gravity, UA 1.006 1.001 - 1.030    Glucose, UA Negative Negative    Ketones, UA Negative Negative    Bilirubin, UA Negative Negative    Blood, UA Negative Negative    Protein, UA Negative Negative    Leuk Esterase, UA Negative Negative    Nitrite, UA Negative Negative    Urobilinogen, UA 0.2 E.U./dL 0.2 - 1.0 E.U./dL   High Sensitivity Troponin T 1Hr    Collection Time: 01/24/25  1:23 PM    Specimen: Blood    Result Value Ref Range    HS Troponin T <6 <22 ng/L    Troponin T Numeric Delta          Problems Addressed this Visit          Cardiac and Vasculature    HTN (hypertension) - Primary     Hypertension is uncontrolled  Medication changes per orders.  Blood pressure will be reassessed 2 months .         Relevant Medications    lisinopril (PRINIVIL,ZESTRIL) 20 MG tablet       Neuro    Periodic headache syndrome, not intractable     Headaches are  poorly controlled .    Plan:  Discontinue the following medication/s; imitrex.   and Begin taking the following medication/s; maxalt.     Discussed medication dosage, use, side effects, and goals of treatment in detail.    Discussed monitoring symptoms and use of quick-relief medications and maintenance medication.    General Treatment Goals:   symptom prevention  minimize work absence  minimizing limitation in activity  prevention of exacerbations  decrease use of ER/inpatient care  minimization of adverse effects of treatment    Followup  2 months                     Relevant Medications    rizatriptan (Maxalt) 10 MG tablet     Diagnoses         Codes Comments    Primary hypertension    -  Primary ICD-10-CM: I10  ICD-9-CM: 401.9     Periodic headache syndrome, not intractable     ICD-10-CM: G43.C0  ICD-9-CM: 346.20             Return in about 2 months (around 3/27/2025) for Follow-up HTN, migraine.    Boom Benoit MD   1/27/2025

## 2025-02-01 DIAGNOSIS — E55.9 VITAMIN D DEFICIENCY: ICD-10-CM

## 2025-02-03 RX ORDER — ERGOCALCIFEROL 1.25 MG/1
50000 CAPSULE, LIQUID FILLED ORAL WEEKLY
Qty: 12 CAPSULE | Refills: 0 | OUTPATIENT
Start: 2025-02-03

## 2025-02-03 NOTE — TELEPHONE ENCOUNTER
Rx Refill Note  Requested Prescriptions     Pending Prescriptions Disp Refills    vitamin D (ERGOCALCIFEROL) 1.25 MG (61157 UT) capsule capsule [Pharmacy Med Name: VIT D2 (ERGOCAL) 1.25MG(50,000U) CP] 12 capsule 0     Sig: TAKE 1 CAPSULE BY MOUTH ONCE WEEKLY      Last filled: 10/21/24 12 with 0 refills.  Last office visit with prescribing clinician: 7/24/2024      Next office visit with prescribing clinician: Visit date not found     Refill not appropriate.  Needs to contact PCP for refills.    Araceli Muhammad MA  02/03/25, 08:07 EST

## 2025-02-21 ENCOUNTER — TELEMEDICINE (OUTPATIENT)
Dept: FAMILY MEDICINE CLINIC | Facility: TELEHEALTH | Age: 35
End: 2025-02-21
Payer: COMMERCIAL

## 2025-02-21 DIAGNOSIS — J02.9 ACUTE PHARYNGITIS, UNSPECIFIED ETIOLOGY: Primary | ICD-10-CM

## 2025-02-21 PROCEDURE — 1160F RVW MEDS BY RX/DR IN RCRD: CPT | Performed by: NURSE PRACTITIONER

## 2025-02-21 PROCEDURE — 1159F MED LIST DOCD IN RCRD: CPT | Performed by: NURSE PRACTITIONER

## 2025-02-21 PROCEDURE — 99213 OFFICE O/P EST LOW 20 MIN: CPT | Performed by: NURSE PRACTITIONER

## 2025-02-21 RX ORDER — AMOXICILLIN 875 MG/1
875 TABLET, COATED ORAL 2 TIMES DAILY
Qty: 20 TABLET | Refills: 0 | Status: SHIPPED | OUTPATIENT
Start: 2025-02-21 | End: 2025-03-03

## 2025-02-21 NOTE — PROGRESS NOTES
CHIEF COMPLAINT  Chief Complaint   Patient presents with    Sore Throat         HPI  Rahul Walters is a 34 y.o. male  presents with complaint of sore throat for a couple of days.     Review of Systems   Constitutional:  Positive for chills and diaphoresis. Negative for fatigue and fever.   HENT:  Positive for postnasal drip, sinus pressure and sore throat. Negative for congestion.    Respiratory:  Negative for cough.    Hematological:  Positive for adenopathy.       Past Medical History:   Diagnosis Date    Dental caries     GERD (gastroesophageal reflux disease)     Gingivitis     HTN (hypertension)     Hypertension     Obesity     Periodontal disease     Tobacco dependence        Family History   Problem Relation Age of Onset    Heart attack Mother     Coronary artery disease Mother     Stroke Father     Hypertension Father     No Known Problems Sister     No Known Problems Brother     Cancer Maternal Grandmother     Heart disease Maternal Grandfather     Hypertension Paternal Grandmother     Heart attack Paternal Grandmother     Coronary artery disease Paternal Grandmother     Alcohol abuse Paternal Grandfather     No Known Problems Daughter     No Known Problems Son        Social History     Socioeconomic History    Marital status:      Spouse name: Suni    Number of children: 2    Years of education: H.S.    Highest education level: High school graduate   Tobacco Use    Smoking status: Heavy Smoker     Current packs/day: 1.50     Average packs/day: 1.5 packs/day for 12.0 years (18.0 ttl pk-yrs)     Types: Cigarettes     Passive exposure: Never    Smokeless tobacco: Never   Vaping Use    Vaping status: Former   Substance and Sexual Activity    Alcohol use: No    Drug use: No    Sexual activity: Yes     Partners: Female     Birth control/protection: Tubal ligation         There were no vitals taken for this visit.    PHYSICAL EXAM  Physical Exam   Constitutional: He is oriented to person,  place, and time. He appears well-developed and well-nourished. He does not have a sickly appearance. He does not appear ill. No distress.   HENT:   Head: Normocephalic and atraumatic.   Mouth/Throat: Mouth/Lips are normal.Uvula is midline and oropharynx is clear and moist. Mucous membranes are not pale, not dry, not cyanotic and erythematous. No tonsillar exudate. no white patchesblistered.  Eyes: EOM are normal.   Pulmonary/Chest: Effort normal.  No respiratory distress.  Lymphadenopathy:     He has cervical adenopathy (per patient).   Neurological: He is alert and oriented to person, place, and time.   Skin: Skin is dry.   Psychiatric: He has a normal mood and affect.           Diagnoses and all orders for this visit:    1. Acute pharyngitis, unspecified etiology (Primary)    Other orders  -     amoxicillin (AMOXIL) 875 MG tablet; Take 1 tablet by mouth 2 (Two) Times a Day for 10 days.  Dispense: 20 tablet; Refill: 0        Mode of visit: Video   Myself and Rahul Walters participated in this visit. The patient is located in [address] [place] [address]. I am located in [place], [place]. [employer] and [employer] were utilized.   You have chosen to receive care through a telehealth visit.     Does the patient consent to use a video/audio connection for your medical care today? Yes       Note Disclaimer: At [facility], we believe that sharing information builds trust and better   relationships. You are receiving this note because you recently visited [facility]. It is possible you   will see health information before a provider has talked with you about it. This kind of information can   be easy to misunderstand. To help you fully understand what it means for your health, we urge you to   discuss this note with your provider.    Alda Cantu, KRIS  02/21/2025  15:29 EST

## 2025-02-21 NOTE — PATIENT INSTRUCTIONS
Drink plenty of water  Over the counter pain relievers okay   Gargle with warm salty water for sore throat pain.   If symptoms do not improve in 3-5 days follow up with your primary care provider or urgent care  If symptoms worsen follow up with urgent care or the emergency room         Pharyngitis    Pharyngitis is a sore throat (pharynx). This is when there is redness, pain, and swelling in your throat. Most of the time, this condition gets better on its own. In some cases, you may need medicine.  What are the causes?  An infection from a virus.  An infection from bacteria.  Allergies.  What increases the risk?  Being 5-24 years old.  Being in crowded environments. These include:  Daycares.  Schools.  Dormitories.  Living in a place with cold temperatures outside.  Having a weakened disease-fighting (immune) system.  What are the signs or symptoms?  Symptoms may vary depending on the cause. Common symptoms include:  Sore throat.  Tiredness (fatigue).  Low-grade fever.  Stuffy nose.  Cough.  Headache.  Other symptoms may include:  Glands in the neck (lymph nodes) that are swollen.  Skin rashes.  Film on the throat or tonsils. This can be caused by an infection from bacteria.  Vomiting.  Red, itchy eyes.  Loss of appetite.  Joint pain and muscle aches.  Tonsils that are temporarily bigger than usual (enlarged).  How is this treated?  Many times, treatment is not needed. This condition usually gets better in 3-4 days without treatment.  If the infection is caused by a bacteria, you may be need to take antibiotics.  Follow these instructions at home:  Medicines  Take over-the-counter and prescription medicines only as told by your doctor.  If you were prescribed an antibiotic medicine, take it as told by your doctor. Do not stop taking the antibiotic even if you start to feel better.  Use throat lozenges or sprays to soothe your throat as told by your doctor.  Children can get pharyngitis. Do not give your child  aspirin.  Managing pain  To help with pain, try:  Sipping warm liquids, such as:  Broth.  Herbal tea.  Warm water.  Eating or drinking cold or frozen liquids, such as frozen ice pops.  Rinsing your mouth (gargle) with a salt water mixture 3-4 times a day or as needed.  To make salt water, dissolve ½-1 tsp (3-6 g) of salt in 1 cup (237 mL) of warm water.  Do not swallow this mixture.  Sucking on hard candy or throat lozenges.  Putting a cool-mist humidifier in your bedroom at night to moisten the air.  Sitting in the bathroom with the door closed for 5-10 minutes while you run hot water in the shower.     General instructions    Do not smoke or use any products that contain nicotine or tobacco. If you need help quitting, ask your doctor.  Rest as told by your doctor.  Drink enough fluid to keep your pee (urine) pale yellow.  How is this prevented?  Wash your hands often for at least 20 seconds with soap and water. If soap and water are not available, use hand .  Do not touch your eyes, nose, or mouth with unwashed hands. Wash hands after touching these areas.  Do not share cups or eating utensils.  Avoid close contact with people who are sick.  Contact a doctor if:  You have large, tender lumps in your neck.  You have a rash.  You cough up green, yellow-brown, or bloody spit.  Get help right away if:  You have a stiff neck.  You drool or cannot swallow liquids.  You cannot drink or take medicines without vomiting.  You have very bad pain that does not go away with medicine.  You have problems breathing, and it is not from a stuffy nose.  You have new pain and swelling in your knees, ankles, wrists, or elbows.  These symptoms may be an emergency. Get help right away. Call your local emergency services (911 in the [place]  Do not wait to see if the symptoms will go away.  Do not drive yourself to the hospital.  Summary  Pharyngitis is a sore throat (pharynx). This is when there is redness, pain, and swelling in  your throat.  Most of the time, pharyngitis gets better on its own. Sometimes, you may need medicine.  If you were prescribed an antibiotic medicine, take it as told by your doctor. Do not stop taking the antibiotic even if you start to feel better.  This information is not intended to replace advice given to you by your health care provider. Make sure you discuss any questions you have with your health care provider.  Document Revised: 03/16/2022 Document Reviewed: 03/16/2022  Elsevier Patient Education © 2024 [employer]

## 2025-03-22 DIAGNOSIS — K21.9 GASTROESOPHAGEAL REFLUX DISEASE, UNSPECIFIED WHETHER ESOPHAGITIS PRESENT: ICD-10-CM

## 2025-03-24 RX ORDER — PANTOPRAZOLE SODIUM 20 MG/1
20 TABLET, DELAYED RELEASE ORAL DAILY
Qty: 90 TABLET | Refills: 0 | Status: SHIPPED | OUTPATIENT
Start: 2025-03-24

## 2025-03-27 ENCOUNTER — TELEPHONE (OUTPATIENT)
Dept: FAMILY MEDICINE CLINIC | Facility: CLINIC | Age: 35
End: 2025-03-27

## 2025-04-01 ENCOUNTER — OFFICE VISIT (OUTPATIENT)
Dept: FAMILY MEDICINE CLINIC | Facility: CLINIC | Age: 35
End: 2025-04-01
Payer: COMMERCIAL

## 2025-04-01 VITALS
WEIGHT: 246.2 LBS | DIASTOLIC BLOOD PRESSURE: 82 MMHG | TEMPERATURE: 98 F | HEIGHT: 71 IN | SYSTOLIC BLOOD PRESSURE: 132 MMHG | HEART RATE: 64 BPM | OXYGEN SATURATION: 98 % | BODY MASS INDEX: 34.47 KG/M2

## 2025-04-01 DIAGNOSIS — E55.9 VITAMIN D DEFICIENCY: ICD-10-CM

## 2025-04-01 DIAGNOSIS — E53.8 FOLATE DEFICIENCY: ICD-10-CM

## 2025-04-01 DIAGNOSIS — I10 PRIMARY HYPERTENSION: Primary | ICD-10-CM

## 2025-04-01 DIAGNOSIS — G43.C0 PERIODIC HEADACHE SYNDROME, NOT INTRACTABLE: ICD-10-CM

## 2025-04-01 PROBLEM — J32.9 SINUSITIS: Status: RESOLVED | Noted: 2024-04-02 | Resolved: 2025-04-01

## 2025-04-01 PROCEDURE — 3079F DIAST BP 80-89 MM HG: CPT | Performed by: FAMILY MEDICINE

## 2025-04-01 PROCEDURE — 99214 OFFICE O/P EST MOD 30 MIN: CPT | Performed by: FAMILY MEDICINE

## 2025-04-01 PROCEDURE — 1160F RVW MEDS BY RX/DR IN RCRD: CPT | Performed by: FAMILY MEDICINE

## 2025-04-01 PROCEDURE — 1159F MED LIST DOCD IN RCRD: CPT | Performed by: FAMILY MEDICINE

## 2025-04-01 PROCEDURE — 1126F AMNT PAIN NOTED NONE PRSNT: CPT | Performed by: FAMILY MEDICINE

## 2025-04-01 PROCEDURE — 3075F SYST BP GE 130 - 139MM HG: CPT | Performed by: FAMILY MEDICINE

## 2025-04-01 NOTE — ASSESSMENT & PLAN NOTE
Folic acid level last labs.  Patient has been taking a multivitamin but not a specific folic acid supplement.  Will recheck folic acid levels today and add supplementation if indicated.

## 2025-04-01 NOTE — ASSESSMENT & PLAN NOTE
Headaches are  poorly controlled .    Plan:  Will discontinue Maxalt as it caused him to be very dizzy.  Imitrex did not improve migraine symptoms when he take this in the past.  Will switch patient to Nurtec as needed to try to get better control of migraines.  Patient was encouraged to follow up with neurology..     Discussed medication dosage, use, side effects, and goals of treatment in detail.    Discussed monitoring symptoms and use of quick-relief medications and maintenance medication.    General Treatment Goals:   symptom prevention  minimize work absence  minimizing limitation in activity  prevention of exacerbations  decrease use of ER/inpatient care  minimization of adverse effects of treatment    Followup in 3 months

## 2025-04-01 NOTE — PROGRESS NOTES
Rahul Walters is a 34 y.o. male who presents today for Hypertension, Migraine, and Vitamin D Deficiency      HPI     He has been taking lisinopril 20mg BID and has tolerated this well. He has been checking his blood pressure at home and gets numbers in the 120-130s/70-80s with occasional 140s/80s. He has been taking vitamin D 50,000 units weekly. He has been taking multivitamin but no additional folic acid. He has not seen any improvement in the chronic tingling and numbness with vitamin replacement. He tried the maxalt as needed for migranies but it made him extremely dizzy. He tried imitrex before which did not relieve headaches. He has been having around 2-3 headache days a week. He has not scheduled follow-up with neurology.     Review of Systems   Constitutional:  Negative for fever and unexpected weight loss.   HENT:  Negative for congestion, ear pain and sore throat.    Eyes:  Negative for visual disturbance.   Respiratory:  Negative for cough, shortness of breath and wheezing.    Cardiovascular:  Negative for chest pain and palpitations.   Gastrointestinal:  Negative for abdominal pain, blood in stool, constipation, diarrhea, nausea, vomiting and GERD.   Endocrine: Negative for polydipsia and polyuria.   Genitourinary:  Negative for difficulty urinating.   Musculoskeletal:  Negative for joint swelling.   Skin:  Negative for rash and skin lesions.   Allergic/Immunologic: Negative for environmental allergies.   Neurological:  Positive for numbness (chronic and stable) and headache (chronic). Negative for seizures and syncope.   Hematological:  Does not bruise/bleed easily.   Psychiatric/Behavioral:  Negative for suicidal ideas.         The following portions of the patient's history were reviewed and updated as appropriate: allergies, current medications, past family history, past medical history, past social history, past surgical history and problem list.    Current Outpatient Medications on File  "Prior to Visit   Medication Sig Dispense Refill    albuterol sulfate  (90 Base) MCG/ACT inhaler Inhale 2 puffs Every 4 (Four) Hours As Needed for Wheezing or Shortness of Air. 18 g 0    lisinopril (PRINIVIL,ZESTRIL) 20 MG tablet Take 1 tablet by mouth 2 (Two) Times a Day. 180 tablet 1    multivitamin (Multiple Vitamin) tablet tablet Take 1 tablet by mouth Daily. 30 tablet 1    pantoprazole (PROTONIX) 20 MG EC tablet TAKE 1 TABLET BY MOUTH DAILY 90 tablet 0    Spacer/Aero-Holding Chambers (AeroChamber Plus Santos-Vu) misc       Symbicort 80-4.5 MCG/ACT inhaler       vitamin D (ERGOCALCIFEROL) 1.25 MG (31657 UT) capsule capsule Take 1 capsule by mouth 1 (One) Time Per Week. 12 capsule 0    [DISCONTINUED] rizatriptan (Maxalt) 10 MG tablet Take 1 tablet by mouth 1 (One) Time As Needed for Migraine for up to 1 dose. May repeat in 2 hours if needed (Patient not taking: Reported on 4/1/2025) 9 tablet 5     No current facility-administered medications on file prior to visit.       Allergies   Allergen Reactions    Latex Rash        Visit Vitals  /82 (BP Location: Left arm, Patient Position: Sitting, Cuff Size: Large Adult)   Pulse 64   Temp 98 °F (36.7 °C) (Infrared)   Ht 180.3 cm (71\")   Wt 112 kg (246 lb 3.2 oz)   SpO2 98%   BMI 34.34 kg/m²        Physical Exam  Constitutional:       General: He is not in acute distress.     Appearance: He is well-developed. He is not diaphoretic.   HENT:      Head: Atraumatic.   Cardiovascular:      Rate and Rhythm: Normal rate and regular rhythm.      Heart sounds: Normal heart sounds. No murmur heard.     No friction rub. No gallop.   Pulmonary:      Effort: Pulmonary effort is normal. No respiratory distress.      Breath sounds: Normal breath sounds. No stridor. No wheezing, rhonchi or rales.   Musculoskeletal:      Cervical back: Normal range of motion and neck supple.   Skin:     General: Skin is warm and dry.   Neurological:      Mental Status: He is alert and oriented to " person, place, and time.   Psychiatric:         Behavior: Behavior normal.          Results for orders placed or performed during the hospital encounter of 01/24/25   CBC Auto Differential    Collection Time: 01/24/25 11:31 AM    Specimen: Blood   Result Value Ref Range    WBC 10.54 3.40 - 10.80 10*3/mm3    RBC 5.63 4.14 - 5.80 10*6/mm3    Hemoglobin 16.9 13.0 - 17.7 g/dL    Hematocrit 50.1 37.5 - 51.0 %    MCV 89.0 79.0 - 97.0 fL    MCH 30.0 26.6 - 33.0 pg    MCHC 33.7 31.5 - 35.7 g/dL    RDW 12.8 12.3 - 15.4 %    RDW-SD 41.5 37.0 - 54.0 fl    MPV 11.5 6.0 - 12.0 fL    Platelets 197 140 - 450 10*3/mm3    Neutrophil % 65.3 42.7 - 76.0 %    Lymphocyte % 24.6 19.6 - 45.3 %    Monocyte % 8.0 5.0 - 12.0 %    Eosinophil % 1.3 0.3 - 6.2 %    Basophil % 0.5 0.0 - 1.5 %    Immature Grans % 0.3 0.0 - 0.5 %    Neutrophils, Absolute 6.89 1.70 - 7.00 10*3/mm3    Lymphocytes, Absolute 2.59 0.70 - 3.10 10*3/mm3    Monocytes, Absolute 0.84 0.10 - 0.90 10*3/mm3    Eosinophils, Absolute 0.14 0.00 - 0.40 10*3/mm3    Basophils, Absolute 0.05 0.00 - 0.20 10*3/mm3    Immature Grans, Absolute 0.03 0.00 - 0.05 10*3/mm3    nRBC 0.0 0.0 - 0.2 /100 WBC   Scan Slide    Collection Time: 01/24/25 11:31 AM    Specimen: Blood   Result Value Ref Range    RBC Morphology Normal Normal    WBC Morphology Normal Normal    Platelet Estimate Adequate Normal    Clumped Platelets Present None Seen    Large Platelets Slight/1+ None Seen   Green Top (Gel)    Collection Time: 01/24/25 11:31 AM   Result Value Ref Range    Extra Tube Hold for add-ons.    Lavender Top    Collection Time: 01/24/25 11:31 AM   Result Value Ref Range    Extra Tube hold for add-on    Gold Top - SST    Collection Time: 01/24/25 11:31 AM   Result Value Ref Range    Extra Tube Hold for add-ons.    Gray Top    Collection Time: 01/24/25 11:31 AM   Result Value Ref Range    Extra Tube Hold for add-ons.    Light Blue Top    Collection Time: 01/24/25 11:31 AM   Result Value Ref Range     Extra Tube Hold for add-ons.    ECG 12 Lead Rhythm Change    Collection Time: 01/24/25 12:02 PM   Result Value Ref Range    QT Interval 378 ms    QTC Interval 425 ms   Comprehensive Metabolic Panel    Collection Time: 01/24/25 12:12 PM    Specimen: Blood   Result Value Ref Range    Glucose 112 (H) 65 - 99 mg/dL    BUN 6 6 - 20 mg/dL    Creatinine 0.83 0.76 - 1.27 mg/dL    Sodium 144 136 - 145 mmol/L    Potassium 4.7 3.5 - 5.2 mmol/L    Chloride 104 98 - 107 mmol/L    CO2 31.0 (H) 22.0 - 29.0 mmol/L    Calcium 9.9 8.6 - 10.5 mg/dL    Total Protein 7.4 6.0 - 8.5 g/dL    Albumin 4.5 3.5 - 5.2 g/dL    ALT (SGPT) 19 1 - 41 U/L    AST (SGOT) 16 1 - 40 U/L    Alkaline Phosphatase 74 39 - 117 U/L    Total Bilirubin 0.4 0.0 - 1.2 mg/dL    Globulin 2.9 gm/dL    A/G Ratio 1.6 g/dL    BUN/Creatinine Ratio 7.2 7.0 - 25.0    Anion Gap 9.0 5.0 - 15.0 mmol/L    eGFR 117.8 >60.0 mL/min/1.73   BNP    Collection Time: 01/24/25 12:12 PM    Specimen: Blood   Result Value Ref Range    proBNP 98.6 0.0 - 450.0 pg/mL   High Sensitivity Troponin T    Collection Time: 01/24/25 12:12 PM    Specimen: Blood   Result Value Ref Range    HS Troponin T <6 <22 ng/L   Urinalysis With Microscopic If Indicated (No Culture) - Urine, Clean Catch    Collection Time: 01/24/25 12:40 PM    Specimen: Urine, Clean Catch   Result Value Ref Range    Color, UA Yellow Yellow, Straw    Appearance, UA Clear Clear    pH, UA 7.0 5.0 - 8.0    Specific Gravity, UA 1.006 1.001 - 1.030    Glucose, UA Negative Negative    Ketones, UA Negative Negative    Bilirubin, UA Negative Negative    Blood, UA Negative Negative    Protein, UA Negative Negative    Leuk Esterase, UA Negative Negative    Nitrite, UA Negative Negative    Urobilinogen, UA 0.2 E.U./dL 0.2 - 1.0 E.U./dL   High Sensitivity Troponin T 1Hr    Collection Time: 01/24/25  1:23 PM    Specimen: Blood   Result Value Ref Range    HS Troponin T <6 <22 ng/L    Troponin T Numeric Delta          Problems Addressed this  Visit          Cardiac and Vasculature    HTN (hypertension) - Primary    Hypertension is stable and controlled  Continue current treatment regimen.  Blood pressure will be reassessed in 3 months.         Relevant Orders    Comprehensive Metabolic Panel       Endocrine and Metabolic    Vitamin D deficiency    Likely improving with vitamin D supplementation.  Will recheck vitamin D level today and make adjustment to supplementation if indicated.         Relevant Orders    Vitamin D,25-Hydroxy    Folate deficiency    Folic acid level last labs.  Patient has been taking a multivitamin but not a specific folic acid supplement.  Will recheck folic acid levels today and add supplementation if indicated.         Relevant Orders    Folate       Neuro    Periodic headache syndrome, not intractable    Headaches are  poorly controlled .    Plan:  Will discontinue Maxalt as it caused him to be very dizzy.  Imitrex did not improve migraine symptoms when he take this in the past.  Will switch patient to Nurtec as needed to try to get better control of migraines.  Patient was encouraged to follow up with neurology..     Discussed medication dosage, use, side effects, and goals of treatment in detail.    Discussed monitoring symptoms and use of quick-relief medications and maintenance medication.    General Treatment Goals:   symptom prevention  minimize work absence  minimizing limitation in activity  prevention of exacerbations  decrease use of ER/inpatient care  minimization of adverse effects of treatment    Followup in 3 months                     Relevant Medications    rimegepant sulfate ODT (Nurtec) 75 MG disintegrating tablet     Diagnoses         Codes Comments      Primary hypertension    -  Primary ICD-10-CM: I10  ICD-9-CM: 401.9       Periodic headache syndrome, not intractable     ICD-10-CM: G43.C0  ICD-9-CM: 346.20       Vitamin D deficiency     ICD-10-CM: E55.9  ICD-9-CM: 268.9       Folate deficiency     ICD-10-CM:  E53.8  ICD-9-CM: 266.2             Return for Next scheduled follow up.    Boom Benoit MD   4/1/2025

## 2025-04-01 NOTE — ASSESSMENT & PLAN NOTE
Likely improving with vitamin D supplementation.  Will recheck vitamin D level today and make adjustment to supplementation if indicated.

## 2025-04-14 DIAGNOSIS — I10 PRIMARY HYPERTENSION: ICD-10-CM

## 2025-04-15 RX ORDER — LISINOPRIL 20 MG/1
20 TABLET ORAL 2 TIMES DAILY
Qty: 180 TABLET | Refills: 0 | Status: SHIPPED | OUTPATIENT
Start: 2025-04-15

## 2025-05-07 ENCOUNTER — TELEPHONE (OUTPATIENT)
Dept: FAMILY MEDICINE CLINIC | Facility: CLINIC | Age: 35
End: 2025-05-07
Payer: COMMERCIAL

## 2025-05-07 NOTE — TELEPHONE ENCOUNTER
Caller: Rahul Wlaters    Relationship: Self    Best call back number:[phone]     What medication are you requesting: CHANTIX STARTER PACK    What are your current symptoms: TO HELP STOP SMOKING      If a prescription is needed, what is your preferred pharmacy and phone number: [facility] [id number] - [place], [place] - [address] - [phone]  - [phone] FX     Additional notes:ANY ISSUES CALL PATIENT

## 2025-05-13 ENCOUNTER — TELEPHONE (OUTPATIENT)
Dept: FAMILY MEDICINE CLINIC | Facility: CLINIC | Age: 35
End: 2025-05-13

## 2025-06-19 DIAGNOSIS — K21.9 GASTROESOPHAGEAL REFLUX DISEASE, UNSPECIFIED WHETHER ESOPHAGITIS PRESENT: ICD-10-CM

## 2025-06-20 RX ORDER — PANTOPRAZOLE SODIUM 20 MG/1
20 TABLET, DELAYED RELEASE ORAL DAILY
Qty: 90 TABLET | Refills: 0 | Status: SHIPPED | OUTPATIENT
Start: 2025-06-20

## 2025-07-17 ENCOUNTER — TELEMEDICINE (OUTPATIENT)
Dept: FAMILY MEDICINE CLINIC | Facility: CLINIC | Age: 35
End: 2025-07-17
Payer: COMMERCIAL

## 2025-07-17 DIAGNOSIS — I10 PRIMARY HYPERTENSION: ICD-10-CM

## 2025-07-17 DIAGNOSIS — Z71.6 ENCOUNTER FOR SMOKING CESSATION COUNSELING: Primary | ICD-10-CM

## 2025-07-17 DIAGNOSIS — J44.89 COPD WITH ASTHMA: ICD-10-CM

## 2025-07-17 PROBLEM — J45.40 MODERATE PERSISTENT ASTHMA WITHOUT COMPLICATION: Status: ACTIVE | Noted: 2025-07-17

## 2025-07-17 RX ORDER — LISINOPRIL 20 MG/1
20 TABLET ORAL 2 TIMES DAILY
Qty: 180 TABLET | Refills: 0 | Status: SHIPPED | OUTPATIENT
Start: 2025-07-17

## 2025-07-17 RX ORDER — VARENICLINE TARTRATE 1 MG/1
1 TABLET, FILM COATED ORAL 2 TIMES DAILY
Qty: 56 TABLET | Refills: 4 | Status: SHIPPED | OUTPATIENT
Start: 2025-08-14 | End: 2026-01-01

## 2025-07-17 RX ORDER — VARENICLINE TARTRATE 0.5 (11)-1
KIT ORAL
Qty: 1 EACH | Refills: 0 | Status: SHIPPED | OUTPATIENT
Start: 2025-07-17 | End: 2025-08-14

## 2025-07-17 NOTE — PROGRESS NOTES
Rahul Walters is a 34 y.o. male who presents today for Asthma and Nicotine Dependence    You have chosen to receive care through a telemedicine visit. Do you consent to use a telemedicine visit for your medical care today? Yes.  Patient is in his home in [place] and I am in my office in [place].      HPI     He has been seeing allergy specialist and they did a breathing test and told him he has asthma and maybe COPD. They have him breztri twice daily and albuterol as needed. He feels like these have helped. He has been using rescue inhaler less but still uses it a couple times a week. He states chronic cough has resolved. Patient would like to quit smoking. He can not used the patches bc he is allergic to something in the patch. He has used chantix in the past and was able to quit for about a year. He would like to try the chantix again. Patient has been taking lisnopril and needs a refill. His blood pressure was controlled but he has not been checking blood pressure recently. He needs refill today. He has no acute complaints today.     Review of Systems   Constitutional:  Negative for fever and unexpected weight loss.   HENT:  Negative for congestion, ear pain and sore throat.    Eyes:  Negative for visual disturbance.   Respiratory:  Negative for cough, shortness of breath and wheezing.    Cardiovascular:  Negative for chest pain and palpitations.   Gastrointestinal:  Negative for abdominal pain, blood in stool, constipation, diarrhea, nausea, vomiting and GERD.   Endocrine: Negative for polydipsia and polyuria.   Genitourinary:  Negative for difficulty urinating.   Musculoskeletal:  Negative for joint swelling.   Skin:  Negative for rash and skin lesions.   Allergic/Immunologic: Negative for environmental allergies.   Neurological:  Positive for numbness (chronic and stable) and headache (chronic). Negative for seizures and syncope.   Hematological:  Does not bruise/bleed easily.    Psychiatric/Behavioral:  Negative for suicidal ideas.         The following portions of the patient's history were reviewed and updated as appropriate: allergies, current medications, past family history, past medical history, past social history, past surgical history and problem list.    Current Outpatient Medications on File Prior to Visit   Medication Sig Dispense Refill    albuterol sulfate  (90 Base) MCG/ACT inhaler Inhale 2 puffs Every 4 (Four) Hours As Needed for Wheezing or Shortness of Air. 18 g 0    Budeson-Glycopyrrol-Formoterol (BREZTRI) 160-9-4.8 MCG/ACT aerosol inhaler Inhale 2 puffs 2 (Two) Times a Day.      multivitamin (Multiple Vitamin) tablet tablet Take 1 tablet by mouth Daily. 30 tablet 1    pantoprazole (PROTONIX) 20 MG EC tablet TAKE 1 TABLET BY MOUTH DAILY 90 tablet 0    rimegepant sulfate ODT (Nurtec) 75 MG disintegrating tablet Place 1 tablet under the tongue 1 (One) Time As Needed (headache) for up to 1 dose. Do not exceed one dose in a 24 hour period 16 tablet 3    Spacer/Aero-Holding Chambers (AeroChamber Plus Santos-Vu) misc       vitamin D (ERGOCALCIFEROL) 1.25 MG (57065 UT) capsule capsule Take 1 capsule by mouth 1 (One) Time Per Week. 12 capsule 0    [DISCONTINUED] lisinopril (PRINIVIL,ZESTRIL) 20 MG tablet Take 1 tablet by mouth 2 (Two) Times a Day. 180 tablet 0    [DISCONTINUED] Symbicort 80-4.5 MCG/ACT inhaler        No current facility-administered medications on file prior to visit.       Allergies   Allergen Reactions    Latex Rash    Nicoderm [Nicotine] Rash        There were no vitals taken for this visit.     Physical Exam  Constitutional:       General: He is not in acute distress.     Appearance: Normal appearance. He is not ill-appearing, toxic-appearing or diaphoretic.   Pulmonary:      Effort: Pulmonary effort is normal. No respiratory distress.   Neurological:      General: No focal deficit present.      Mental Status: He is alert. Mental status is at baseline.    Psychiatric:         Mood and Affect: Mood normal.         Behavior: Behavior normal.          Results for orders placed or performed during the hospital encounter of 01/24/25   CBC Auto Differential    Collection Time: 01/24/25 11:31 AM    Specimen: Blood   Result Value Ref Range    WBC 10.54 3.40 - 10.80 10*3/mm3    RBC 5.63 4.14 - 5.80 10*6/mm3    Hemoglobin 16.9 13.0 - 17.7 g/dL    Hematocrit 50.1 37.5 - 51.0 %    MCV 89.0 79.0 - 97.0 fL    MCH 30.0 26.6 - 33.0 pg    MCHC 33.7 31.5 - 35.7 g/dL    RDW 12.8 12.3 - 15.4 %    RDW-SD 41.5 37.0 - 54.0 fl    MPV 11.5 6.0 - 12.0 fL    Platelets 197 140 - 450 10*3/mm3    Neutrophil % 65.3 42.7 - 76.0 %    Lymphocyte % 24.6 19.6 - 45.3 %    Monocyte % 8.0 5.0 - 12.0 %    Eosinophil % 1.3 0.3 - 6.2 %    Basophil % 0.5 0.0 - 1.5 %    Immature Grans % 0.3 0.0 - 0.5 %    Neutrophils, Absolute 6.89 1.70 - 7.00 10*3/mm3    Lymphocytes, Absolute 2.59 0.70 - 3.10 10*3/mm3    Monocytes, Absolute 0.84 0.10 - 0.90 10*3/mm3    Eosinophils, Absolute 0.14 0.00 - 0.40 10*3/mm3    Basophils, Absolute 0.05 0.00 - 0.20 10*3/mm3    Immature Grans, Absolute 0.03 0.00 - 0.05 10*3/mm3    nRBC 0.0 0.0 - 0.2 /100 WBC   Scan Slide    Collection Time: 01/24/25 11:31 AM    Specimen: Blood   Result Value Ref Range    RBC Morphology Normal Normal    WBC Morphology Normal Normal    Platelet Estimate Adequate Normal    Clumped Platelets Present None Seen    Large Platelets Slight/1+ None Seen   Green Top (Gel)    Collection Time: 01/24/25 11:31 AM   Result Value Ref Range    Extra Tube Hold for add-ons.    Lavender Top    Collection Time: 01/24/25 11:31 AM   Result Value Ref Range    Extra Tube hold for add-on    Gold Top - SST    Collection Time: 01/24/25 11:31 AM   Result Value Ref Range    Extra Tube Hold for add-ons.    Gray Top    Collection Time: 01/24/25 11:31 AM   Result Value Ref Range    Extra Tube Hold for add-ons.    Light Blue Top    Collection Time: 01/24/25 11:31 AM   Result Value Ref  Range    Extra Tube Hold for add-ons.    ECG 12 Lead Rhythm Change    Collection Time: 01/24/25 12:02 PM   Result Value Ref Range    QT Interval 378 ms    QTC Interval 425 ms   Comprehensive Metabolic Panel    Collection Time: 01/24/25 12:12 PM    Specimen: Blood   Result Value Ref Range    Glucose 112 (H) 65 - 99 mg/dL    BUN 6 6 - 20 mg/dL    Creatinine 0.83 0.76 - 1.27 mg/dL    Sodium 144 136 - 145 mmol/L    Potassium 4.7 3.5 - 5.2 mmol/L    Chloride 104 98 - 107 mmol/L    CO2 31.0 (H) 22.0 - 29.0 mmol/L    Calcium 9.9 8.6 - 10.5 mg/dL    Total Protein 7.4 6.0 - 8.5 g/dL    Albumin 4.5 3.5 - 5.2 g/dL    ALT (SGPT) 19 1 - 41 U/L    AST (SGOT) 16 1 - 40 U/L    Alkaline Phosphatase 74 39 - 117 U/L    Total Bilirubin 0.4 0.0 - 1.2 mg/dL    Globulin 2.9 gm/dL    A/G Ratio 1.6 g/dL    BUN/Creatinine Ratio 7.2 7.0 - 25.0    Anion Gap 9.0 5.0 - 15.0 mmol/L    eGFR 117.8 >60.0 mL/min/1.73   BNP    Collection Time: 01/24/25 12:12 PM    Specimen: Blood   Result Value Ref Range    proBNP 98.6 0.0 - 450.0 pg/mL   High Sensitivity Troponin T    Collection Time: 01/24/25 12:12 PM    Specimen: Blood   Result Value Ref Range    HS Troponin T <6 <22 ng/L   Urinalysis With Microscopic If Indicated (No Culture) - Urine, Clean Catch    Collection Time: 01/24/25 12:40 PM    Specimen: Urine, Clean Catch   Result Value Ref Range    Color, UA Yellow Yellow, Straw    Appearance, UA Clear Clear    pH, UA 7.0 5.0 - 8.0    Specific Gravity, UA 1.006 1.001 - 1.030    Glucose, UA Negative Negative    Ketones, UA Negative Negative    Bilirubin, UA Negative Negative    Blood, UA Negative Negative    Protein, UA Negative Negative    Leuk Esterase, UA Negative Negative    Nitrite, UA Negative Negative    Urobilinogen, UA 0.2 E.U./dL 0.2 - 1.0 E.U./dL   High Sensitivity Troponin T 1Hr    Collection Time: 01/24/25  1:23 PM    Specimen: Blood   Result Value Ref Range    HS Troponin T <6 <22 ng/L    Troponin T Numeric Delta          Problems Addressed  this Visit          Cardiac and Vasculature    HTN (hypertension)    Hypertension is stable and controlled  Continue current treatment regimen.  Blood pressure will be reassessed in 3 months.         Relevant Medications    lisinopril (PRINIVIL,ZESTRIL) 20 MG tablet       Pulmonary and Pneumonias    COPD with asthma    Asthma is improving with treatment.  The patient is experiencing weekly daytime asthma symptoms and he is experiencing no nighttime asthma symptoms.    Plan: Continue same medication/s without change.   and continue following with allergy and asthma specialist.    Discussed medication dosage, use, side effects, and goals of treatment in detail.    Discussed distinction between quick-relief and maintenance control medications.  Discussed monitoring symptoms and use of quick-relief medications and contacting provider early in the course of exacerbations.  Warning signs of respiratory distress were reviewed with the patient.     Patient Treatment Goals: symptom prevention, minimizing limitation in activity, prevention of exacerbations and use of ER/inpatient care, maintenance of optimal pulmonary function, and minimization of adverse effects of treatment.    Followup in 3 months.                 Relevant Medications    Budeson-Glycopyrrol-Formoterol (BREZTRI) 160-9-4.8 MCG/ACT aerosol inhaler       Tobacco    Encounter for smoking cessation counseling - Primary    Rahul Walters  reports that he has been smoking cigarettes. He has a 18 pack-year smoking history. He has never been exposed to tobacco smoke. He has never used smokeless tobacco. I have educated him on the risk of diseases from using tobacco products such as cancer, COPD, heart disease, cataracts, and arterial disease.     I advised him to quit and he is willing to quit. We have discussed the following method/s for tobacco cessation:  Education Material, Counseling, [place], OTC Cessation Products, and Prescription Medication.   Together we have set a quit date for 1 week from today.  He will follow up with me in 3 months or sooner to check on his progress.    I spent 7.5 minutes counseling the patient.               Relevant Medications    Varenicline Tartrate, Starter, 0.5 MG X 11 & 1 MG X 42 tablet therapy pack    varenicline (CHANTIX) 1 MG tablet (Start on 8/14/2025)     Diagnoses         Codes Comments      Encounter for smoking cessation counseling    -  Primary ICD-10-CM: Z71.6  ICD-9-CM: V65.42, 305.1       Primary hypertension     ICD-10-CM: I10  ICD-9-CM: 401.9       COPD with asthma     ICD-10-CM: J44.89  ICD-9-CM: 493.20             Return in about 3 months (around 10/17/2025) for Annual.    This was an audio and video enabled telemedicine encounter.    Boom Benoit MD   7/17/2025

## 2025-07-17 NOTE — PATIENT INSTRUCTIONS
"Smoking Tobacco Information, Adult  Smoking tobacco can be harmful to your health. Tobacco contains a toxic colorless chemical called nicotine. Nicotine causes changes in your brain that make you want more and more. This is called addiction. This can make it hard to stop smoking once you start. Tobacco also has other toxic chemicals that can hurt your body and raise your risk of many cancers.  Menthol or \"lite\" tobacco or cigarette brands are not safer than regular brands.  How can smoking tobacco affect me?  Smoking tobacco puts you at risk for:  Cancer. Smoking is most commonly associated with lung cancer, but can also lead to cancer in other parts of the body.  Chronic obstructive pulmonary disease (COPD). This is a long-term lung condition that makes it hard to breathe. It also gets worse over time.  High blood pressure (hypertension), heart disease, stroke, heart attack, and lung infections, such as pneumonia.  Cataracts. This is when the lenses in the eyes become clouded.  Digestive problems. This may include peptic ulcers, heartburn, and gastroesophageal reflux disease (GERD).  Oral health problems, such as gum disease, mouth sores, and tooth loss.  Loss of taste and smell.  Smoking also affects how you look and smell. Smoking may cause:  Wrinkles.  Yellow or stained teeth, fingers, and fingernails.  Bad breath.  Bad-smelling clothes and hair.  Smoking tobacco can also affect your social life, because:  It may be challenging to find places to smoke when away from home. Many workplaces, restaurants, hotels, and public places are tobacco-free.  Smoking is expensive. This is due to the cost of tobacco and the long-term costs of treating health problems from smoking.  Secondhand smoke may affect those around you. Secondhand smoke can cause lung cancer, breathing problems, and heart disease. Children of smokers have a higher risk for:  Sudden infant death syndrome (SIDS).  Ear infections.  Lung infections.  What " actions can I take to prevent health problems?  Quit smoking    Do not start smoking. Quit if you already smoke.  Do not replace cigarette smoking with vaping devices, such as e-cigarettes.  Make a plan to quit smoking and commit to it. Look for programs to help you, and ask your health care provider for recommendations and ideas. Set a date and write down all the reasons you want to quit.  Let your friends and family know you are quitting so they can help and support you. Consider finding friends who also want to quit. It can be easier to quit with someone else, so that you can support each other.  Talk with your health care provider about using nicotine replacement medicines to help you quit. These include gum, lozenges, patches, sprays, or pills.  If you try to quit but return to smoking, stay positive. It is common to slip up when you first quit, so take it one day at a time.  Be prepared for cravings. When you feel the urge to smoke, chew gum or suck on hard candy.  Lifestyle  Stay busy.  Take care of your body. Get plenty of exercise, eat a healthy diet, and drink plenty of water.  Find ways to manage your stress, such as meditation, yoga, exercise, or time spent with friends and family.  Ask your health care provider about having regular tests (screenings) to check for cancer. This may include blood tests, imaging tests, and other tests.  Where to find support  To get support to quit smoking, consider:  Asking your health care provider for more information and resources.  Joining a support group for people who want to quit smoking in your local community. There are many effective programs that may help you to quit.  Calling the smokefree.gov counselor helpline at 0-154-QUIT-NOW (1-217.332.7684).  Where to find more information  You may find more information about quitting smoking from:  Centers for Disease Control and Prevention: cdc.gov/tobacco  Smokefree.gov: smokefree.gov  American Lung Association:  "freedomfromsmoking.org  Contact a health care provider if:  You have problems breathing.  Your lips, nose, or fingers turn blue.  You have chest pain.  You are coughing up blood.  You feel like you will faint.  You have other health changes that cause you to worry.  Summary  Smoking tobacco can negatively affect your health, the health of those around you, your finances, and your social life.  Do not start smoking. Quit if you already smoke. If you need help quitting, ask your health care provider.  Consider joining a support group for people in your local community who want to quit smoking. There are many effective programs that may help you to quit.  This information is not intended to replace advice given to you by your health care provider. Make sure you discuss any questions you have with your health care provider.  Document Revised: 12/13/2022 Document Reviewed: 12/13/2022  [employer] Patient Education © 2024 [employer]Hypertension, Adult  High blood pressure (hypertension) is when the force of blood pumping through the arteries is too strong. The arteries are the blood vessels that carry blood from the heart throughout the body. Hypertension forces the heart to work harder to pump blood and may cause arteries to become narrow or stiff. Untreated or uncontrolled hypertension can lead to a heart attack, heart failure, a stroke, kidney disease, and other problems.  A blood pressure reading consists of a higher number over a lower number. Ideally, your blood pressure should be below 120/80. The first (\"top\") number is called the systolic pressure. It is a measure of the pressure in your arteries as your heart beats. The second (\"bottom\") number is called the diastolic pressure. It is a measure of the pressure in your arteries as the heart relaxes.  What are the causes?  The exact cause of this condition is not known. There are some conditions that result in high blood pressure.  What increases the risk?  Certain " factors may make you more likely to develop high blood pressure. Some of these risk factors are under your control, including:  Smoking.  Not getting enough exercise or physical activity.  Being overweight.  Having too much fat, sugar, calories, or salt (sodium) in your diet.  Drinking too much alcohol.  Other risk factors include:  Having a personal history of heart disease, diabetes, high cholesterol, or kidney disease.  Stress.  Having a family history of high blood pressure and high cholesterol.  Having obstructive sleep apnea.  Age. The risk increases with age.  What are the signs or symptoms?  High blood pressure may not cause symptoms. Very high blood pressure (hypertensive crisis) may cause:  Headache.  Fast or irregular heartbeats (palpitations).  Shortness of breath.  Nosebleed.  Nausea and vomiting.  Vision changes.  Severe chest pain, dizziness, and seizures.  How is this diagnosed?  This condition is diagnosed by measuring your blood pressure while you are seated, with your arm resting on a flat surface, your legs uncrossed, and your feet flat on the floor. The cuff of the blood pressure monitor will be placed directly against the skin of your upper arm at the level of your heart. Blood pressure should be measured at least twice using the same arm. Certain conditions can cause a difference in blood pressure between your right and left arms.  If you have a high blood pressure reading during one visit or you have normal blood pressure with other risk factors, you may be asked to:  Return on a different day to have your blood pressure checked again.  Monitor your blood pressure at home for 1 week or longer.  If you are diagnosed with hypertension, you may have other blood or imaging tests to help your health care provider understand your overall risk for other conditions.  How is this treated?  This condition is treated by making healthy lifestyle changes, such as eating healthy foods, exercising more, and  reducing your alcohol intake. You may be referred for counseling on a healthy diet and physical activity.  Your health care provider may prescribe medicine if lifestyle changes are not enough to get your blood pressure under control and if:  Your systolic blood pressure is above 130.  Your diastolic blood pressure is above 80.  Your personal target blood pressure may vary depending on your medical conditions, your age, and other factors.  Follow these instructions at home:  Eating and drinking    Eat a diet that is high in fiber and potassium, and low in sodium, added sugar, and fat. An example of this eating plan is called the DASH diet. DASH stands for Dietary Approaches to Stop Hypertension. To eat this way:  Eat plenty of fresh fruits and vegetables. Try to fill one half of your plate at each meal with fruits and vegetables.  Eat whole grains, such as whole-wheat pasta, brown rice, or whole-grain bread. Fill about one fourth of your plate with whole grains.  Eat or drink low-fat dairy products, such as skim milk or low-fat yogurt.  Avoid fatty cuts of meat, processed or cured meats, and poultry with skin. Fill about one fourth of your plate with lean proteins, such as fish, chicken without skin, beans, eggs, or tofu.  Avoid pre-made and processed foods. These tend to be higher in sodium, added sugar, and fat.  Reduce your daily sodium intake. Many people with hypertension should eat less than 1,500 mg of sodium a day.  Do not drink alcohol if:  Your health care provider tells you not to drink.  You are pregnant, may be pregnant, or are planning to become pregnant.  If you drink alcohol:  Limit how much you have to:  0-1 drink a day for women.  0-2 drinks a day for men.  Know how much alcohol is in your drink. In the [place] one drink equals one 12 oz bottle of beer (355 mL), one 5 oz glass of wine (148 mL), or one 1½ oz glass of hard liquor (44 mL).  Lifestyle    Work with your health care provider to maintain a  healthy body weight or to lose weight. Ask what an ideal weight is for you.  Get at least 30 minutes of exercise that causes your heart to beat faster (aerobic exercise) most days of the week. Activities may include walking, swimming, or biking.  Include exercise to strengthen your muscles (resistance exercise), such as Pilates or lifting weights, as part of your weekly exercise routine. Try to do these types of exercises for 30 minutes at least 3 days a week.  Do not use any products that contain nicotine or tobacco. These products include cigarettes, chewing tobacco, and vaping devices, such as e-cigarettes. If you need help quitting, ask your health care provider.  Monitor your blood pressure at home as told by your health care provider.  Keep all follow-up visits. This is important.  Medicines  Take over-the-counter and prescription medicines only as told by your health care provider. Follow directions carefully. Blood pressure medicines must be taken as prescribed.  Do not skip doses of blood pressure medicine. Doing this puts you at risk for problems and can make the medicine less effective.  Ask your health care provider about side effects or reactions to medicines that you should watch for.  Contact a health care provider if you:  Think you are having a reaction to a medicine you are taking.  Have headaches that keep coming back (recurring).  Feel dizzy.  Have swelling in your ankles.  Have trouble with your vision.  Get help right away if you:  Develop a severe headache or confusion.  Have unusual weakness or numbness.  Feel faint.  Have severe pain in your chest or abdomen.  Vomit repeatedly.  Have trouble breathing.  These symptoms may be an emergency. Get help right away. Call 911.  Do not wait to see if the symptoms will go away.  Do not drive yourself to the hospital.  Summary  Hypertension is when the force of blood pumping through your arteries is too strong. If this condition is not controlled, it  may put you at risk for serious complications.  Your personal target blood pressure may vary depending on your medical conditions, your age, and other factors. For most people, a normal blood pressure is less than 120/80.  Hypertension is treated with lifestyle changes, medicines, or a combination of both. Lifestyle changes include losing weight, eating a healthy, low-sodium diet, exercising more, and limiting alcohol.  This information is not intended to replace advice given to you by your health care provider. Make sure you discuss any questions you have with your health care provider.  Document Revised: 10/25/2022 Document Reviewed: 10/25/2022  [employer] Patient Education © 2024 [employer]

## 2025-07-17 NOTE — ASSESSMENT & PLAN NOTE
Asthma is improving with treatment.  The patient is experiencing weekly daytime asthma symptoms and he is experiencing no nighttime asthma symptoms.    Plan: Continue same medication/s without change.   and continue following with allergy and asthma specialist.    Discussed medication dosage, use, side effects, and goals of treatment in detail.    Discussed distinction between quick-relief and maintenance control medications.  Discussed monitoring symptoms and use of quick-relief medications and contacting provider early in the course of exacerbations.  Warning signs of respiratory distress were reviewed with the patient.     Patient Treatment Goals: symptom prevention, minimizing limitation in activity, prevention of exacerbations and use of ER/inpatient care, maintenance of optimal pulmonary function, and minimization of adverse effects of treatment.    Followup in 3 months.

## 2025-07-17 NOTE — ASSESSMENT & PLAN NOTE
Rahul Walters  reports that he has been smoking cigarettes. He has a 18 pack-year smoking history. He has never been exposed to tobacco smoke. He has never used smokeless tobacco. I have educated him on the risk of diseases from using tobacco products such as cancer, COPD, heart disease, cataracts, and arterial disease.     I advised him to quit and he is willing to quit. We have discussed the following method/s for tobacco cessation:  Education Material, Counseling, [place], OTC Cessation Products, and Prescription Medication.  Together we have set a quit date for 1 week from today.  He will follow up with me in 3 months or sooner to check on his progress.    I spent 7.5 minutes counseling the patient.

## 2025-08-19 ENCOUNTER — OFFICE VISIT (OUTPATIENT)
Dept: FAMILY MEDICINE CLINIC | Facility: CLINIC | Age: 35
End: 2025-08-19
Payer: COMMERCIAL

## 2025-08-19 ENCOUNTER — LAB (OUTPATIENT)
Dept: LAB | Facility: HOSPITAL | Age: 35
End: 2025-08-19
Payer: COMMERCIAL

## 2025-08-19 ENCOUNTER — PRIOR AUTHORIZATION (OUTPATIENT)
Dept: FAMILY MEDICINE CLINIC | Facility: CLINIC | Age: 35
End: 2025-08-19

## 2025-08-19 VITALS
DIASTOLIC BLOOD PRESSURE: 64 MMHG | HEART RATE: 75 BPM | TEMPERATURE: 98.9 F | BODY MASS INDEX: 34.02 KG/M2 | HEIGHT: 71 IN | SYSTOLIC BLOOD PRESSURE: 118 MMHG | OXYGEN SATURATION: 97 % | WEIGHT: 243 LBS

## 2025-08-19 DIAGNOSIS — E53.8 FOLATE DEFICIENCY: ICD-10-CM

## 2025-08-19 DIAGNOSIS — G43.C0 PERIODIC HEADACHE SYNDROME, NOT INTRACTABLE: ICD-10-CM

## 2025-08-19 DIAGNOSIS — M54.50 ACUTE BILATERAL LOW BACK PAIN WITHOUT SCIATICA: Primary | ICD-10-CM

## 2025-08-19 DIAGNOSIS — M54.6 ACUTE BILATERAL THORACIC BACK PAIN: ICD-10-CM

## 2025-08-19 DIAGNOSIS — I10 PRIMARY HYPERTENSION: ICD-10-CM

## 2025-08-19 DIAGNOSIS — E55.9 VITAMIN D DEFICIENCY: ICD-10-CM

## 2025-08-19 LAB
25(OH)D3 SERPL-MCNC: 39.6 NG/ML (ref 30–100)
ALBUMIN SERPL-MCNC: 4.9 G/DL (ref 3.5–5.2)
ALBUMIN/GLOB SERPL: 1.5 G/DL
ALP SERPL-CCNC: 89 U/L (ref 39–117)
ALT SERPL W P-5'-P-CCNC: 24 U/L (ref 1–41)
ANION GAP SERPL CALCULATED.3IONS-SCNC: 16 MMOL/L (ref 5–15)
AST SERPL-CCNC: 26 U/L (ref 1–40)
BILIRUB SERPL-MCNC: 0.2 MG/DL (ref 0–1.2)
BUN SERPL-MCNC: 12 MG/DL (ref 6–20)
BUN/CREAT SERPL: 12.6 (ref 7–25)
CALCIUM SPEC-SCNC: 10.2 MG/DL (ref 8.6–10.5)
CHLORIDE SERPL-SCNC: 100 MMOL/L (ref 98–107)
CO2 SERPL-SCNC: 21 MMOL/L (ref 22–29)
CREAT SERPL-MCNC: 0.95 MG/DL (ref 0.76–1.27)
EGFRCR SERPLBLD CKD-EPI 2021: 107.7 ML/MIN/1.73
FOLATE SERPL-MCNC: 17.6 NG/ML (ref 4.78–24.2)
GLOBULIN UR ELPH-MCNC: 3.3 GM/DL
GLUCOSE SERPL-MCNC: 88 MG/DL (ref 65–99)
POTASSIUM SERPL-SCNC: 4.6 MMOL/L (ref 3.5–5.2)
PROT SERPL-MCNC: 8.2 G/DL (ref 6–8.5)
SODIUM SERPL-SCNC: 137 MMOL/L (ref 136–145)

## 2025-08-19 PROCEDURE — 3074F SYST BP LT 130 MM HG: CPT | Performed by: FAMILY MEDICINE

## 2025-08-19 PROCEDURE — 1160F RVW MEDS BY RX/DR IN RCRD: CPT | Performed by: FAMILY MEDICINE

## 2025-08-19 PROCEDURE — 99214 OFFICE O/P EST MOD 30 MIN: CPT | Performed by: FAMILY MEDICINE

## 2025-08-19 PROCEDURE — 1125F AMNT PAIN NOTED PAIN PRSNT: CPT | Performed by: FAMILY MEDICINE

## 2025-08-19 PROCEDURE — 82306 VITAMIN D 25 HYDROXY: CPT

## 2025-08-19 PROCEDURE — 82746 ASSAY OF FOLIC ACID SERUM: CPT

## 2025-08-19 PROCEDURE — 3078F DIAST BP <80 MM HG: CPT | Performed by: FAMILY MEDICINE

## 2025-08-19 PROCEDURE — 1159F MED LIST DOCD IN RCRD: CPT | Performed by: FAMILY MEDICINE

## 2025-08-19 PROCEDURE — 80053 COMPREHEN METABOLIC PANEL: CPT

## 2025-08-19 RX ORDER — METHYLPREDNISOLONE 4 MG/1
TABLET ORAL
Qty: 21 TABLET | Refills: 0 | Status: SHIPPED | OUTPATIENT
Start: 2025-08-19 | End: 2025-08-24

## 2025-08-19 RX ORDER — CYCLOBENZAPRINE HCL 5 MG
TABLET ORAL
Qty: 90 TABLET | Refills: 3 | Status: SHIPPED | OUTPATIENT
Start: 2025-08-19